# Patient Record
Sex: FEMALE | Race: WHITE
[De-identification: names, ages, dates, MRNs, and addresses within clinical notes are randomized per-mention and may not be internally consistent; named-entity substitution may affect disease eponyms.]

---

## 2018-07-11 ENCOUNTER — HOSPITAL ENCOUNTER (OUTPATIENT)
Dept: HOSPITAL 62 - SC | Age: 63
Discharge: HOME | End: 2018-07-11
Attending: OPHTHALMOLOGY
Payer: MEDICAID

## 2018-07-11 DIAGNOSIS — J45.909: ICD-10-CM

## 2018-07-11 DIAGNOSIS — F17.200: ICD-10-CM

## 2018-07-11 DIAGNOSIS — H25.11: Primary | ICD-10-CM

## 2018-07-11 DIAGNOSIS — I10: ICD-10-CM

## 2018-07-11 PROCEDURE — 08RJ3JZ REPLACEMENT OF RIGHT LENS WITH SYNTHETIC SUBSTITUTE, PERCUTANEOUS APPROACH: ICD-10-PCS | Performed by: OPHTHALMOLOGY

## 2018-07-11 PROCEDURE — 66984 XCAPSL CTRC RMVL W/O ECP: CPT

## 2018-07-11 PROCEDURE — V2630 ANTER CHAMBER INTRAOCUL LENS: HCPCS

## 2018-07-11 RX ADMIN — CYCLOPENTOLATE HYDROCHLORIDE AND PHENYLEPHRINE HYDROCHLORIDE PRN DROP: 2; 10 SOLUTION/ DROPS OPHTHALMIC at 10:27

## 2018-07-11 RX ADMIN — TETRACAINE HYDROCHLORIDE PRN DROP: 25 LIQUID OPHTHALMIC at 10:45

## 2018-07-11 RX ADMIN — TETRACAINE HYDROCHLORIDE PRN DROP: 25 LIQUID OPHTHALMIC at 10:27

## 2018-07-11 RX ADMIN — CYCLOPENTOLATE HYDROCHLORIDE AND PHENYLEPHRINE HYDROCHLORIDE PRN DROP: 2; 10 SOLUTION/ DROPS OPHTHALMIC at 10:38

## 2018-07-11 RX ADMIN — BESIFLOXACIN PRN DROP: 6 SUSPENSION OPHTHALMIC at 10:27

## 2018-07-11 RX ADMIN — BESIFLOXACIN PRN DROP: 6 SUSPENSION OPHTHALMIC at 11:12

## 2018-07-11 RX ADMIN — TOBRAMYCIN AND DEXAMETHASONE ONE APPLIC: 3; 1 OINTMENT OPHTHALMIC at 11:12

## 2018-07-11 RX ADMIN — TROPICAMIDE PRN DROP: 10 SOLUTION/ DROPS OPHTHALMIC at 10:27

## 2018-07-11 RX ADMIN — TETRACAINE HYDROCHLORIDE PRN DROP: 25 LIQUID OPHTHALMIC at 10:07

## 2018-07-11 RX ADMIN — TROPICAMIDE PRN DROP: 10 SOLUTION/ DROPS OPHTHALMIC at 10:05

## 2018-07-11 RX ADMIN — CYCLOPENTOLATE HYDROCHLORIDE AND PHENYLEPHRINE HYDROCHLORIDE PRN DROP: 2; 10 SOLUTION/ DROPS OPHTHALMIC at 10:05

## 2018-07-11 RX ADMIN — TROPICAMIDE PRN DROP: 10 SOLUTION/ DROPS OPHTHALMIC at 10:38

## 2018-07-11 RX ADMIN — TOBRAMYCIN AND DEXAMETHASONE ONE APPLIC: 3; 1 OINTMENT OPHTHALMIC at 00:00

## 2018-07-11 RX ADMIN — TETRACAINE HYDROCHLORIDE PRN DROP: 25 LIQUID OPHTHALMIC at 10:53

## 2018-07-11 RX ADMIN — BESIFLOXACIN PRN DROP: 6 SUSPENSION OPHTHALMIC at 10:06

## 2018-07-11 RX ADMIN — BESIFLOXACIN PRN DROP: 6 SUSPENSION OPHTHALMIC at 00:00

## 2018-07-25 ENCOUNTER — HOSPITAL ENCOUNTER (OUTPATIENT)
Dept: HOSPITAL 62 - SC | Age: 63
Discharge: HOME | End: 2018-07-25
Attending: OPHTHALMOLOGY
Payer: MEDICAID

## 2018-07-25 DIAGNOSIS — F17.210: ICD-10-CM

## 2018-07-25 DIAGNOSIS — K21.9: ICD-10-CM

## 2018-07-25 DIAGNOSIS — E78.00: ICD-10-CM

## 2018-07-25 DIAGNOSIS — Z88.2: ICD-10-CM

## 2018-07-25 DIAGNOSIS — Z79.82: ICD-10-CM

## 2018-07-25 DIAGNOSIS — I10: ICD-10-CM

## 2018-07-25 DIAGNOSIS — H25.12: Primary | ICD-10-CM

## 2018-07-25 DIAGNOSIS — J45.909: ICD-10-CM

## 2018-07-25 DIAGNOSIS — Z79.899: ICD-10-CM

## 2018-07-25 PROCEDURE — V2630 ANTER CHAMBER INTRAOCUL LENS: HCPCS

## 2018-07-25 PROCEDURE — 66984 XCAPSL CTRC RMVL W/O ECP: CPT

## 2018-07-25 RX ADMIN — CYCLOPENTOLATE HYDROCHLORIDE AND PHENYLEPHRINE HYDROCHLORIDE PRN DROP: 2; 10 SOLUTION/ DROPS OPHTHALMIC at 11:25

## 2018-07-25 RX ADMIN — TETRACAINE HYDROCHLORIDE PRN DROP: 25 LIQUID OPHTHALMIC at 11:37

## 2018-07-25 RX ADMIN — CYCLOPENTOLATE HYDROCHLORIDE AND PHENYLEPHRINE HYDROCHLORIDE PRN DROP: 2; 10 SOLUTION/ DROPS OPHTHALMIC at 11:14

## 2018-07-25 RX ADMIN — BESIFLOXACIN PRN DROP: 6 SUSPENSION OPHTHALMIC at 11:04

## 2018-07-25 RX ADMIN — BESIFLOXACIN PRN DROP: 6 SUSPENSION OPHTHALMIC at 00:00

## 2018-07-25 RX ADMIN — SODIUM CHONDROITIN SULFATE / SODIUM HYALURONATE ONE EACH: 0.55-0.5 INJECTION INTRAOCULAR at 00:00

## 2018-07-25 RX ADMIN — SODIUM CHONDROITIN SULFATE / SODIUM HYALURONATE ONE EACH: 0.55-0.5 INJECTION INTRAOCULAR at 11:47

## 2018-07-25 RX ADMIN — BESIFLOXACIN PRN DROP: 6 SUSPENSION OPHTHALMIC at 11:17

## 2018-07-25 RX ADMIN — EPINEPHRINE ONE MG: 1 INJECTION, SOLUTION, CONCENTRATE INTRAVENOUS at 11:47

## 2018-07-25 RX ADMIN — TETRACAINE HYDROCHLORIDE PRN DROP: 25 LIQUID OPHTHALMIC at 00:00

## 2018-07-25 RX ADMIN — HEPARIN SODIUM ONE ML: 1000 INJECTION, SOLUTION INTRAVENOUS; SUBCUTANEOUS at 11:47

## 2018-07-25 RX ADMIN — TROPICAMIDE PRN DROP: 10 SOLUTION/ DROPS OPHTHALMIC at 11:03

## 2018-07-25 RX ADMIN — CYCLOPENTOLATE HYDROCHLORIDE AND PHENYLEPHRINE HYDROCHLORIDE PRN DROP: 2; 10 SOLUTION/ DROPS OPHTHALMIC at 11:03

## 2018-07-25 RX ADMIN — TROPICAMIDE PRN DROP: 10 SOLUTION/ DROPS OPHTHALMIC at 11:14

## 2018-07-25 RX ADMIN — TROPICAMIDE PRN DROP: 10 SOLUTION/ DROPS OPHTHALMIC at 11:25

## 2018-07-25 RX ADMIN — BESIFLOXACIN PRN DROP: 6 SUSPENSION OPHTHALMIC at 11:59

## 2018-07-25 RX ADMIN — TOBRAMYCIN AND DEXAMETHASONE ONE APPLIC: 3; 1 OINTMENT OPHTHALMIC at 11:59

## 2018-07-25 RX ADMIN — EPINEPHRINE ONE MG: 1 INJECTION, SOLUTION, CONCENTRATE INTRAVENOUS at 00:00

## 2018-07-25 RX ADMIN — HEPARIN SODIUM ONE ML: 1000 INJECTION, SOLUTION INTRAVENOUS; SUBCUTANEOUS at 00:00

## 2018-07-25 RX ADMIN — TETRACAINE HYDROCHLORIDE PRN DROP: 25 LIQUID OPHTHALMIC at 11:26

## 2018-07-25 RX ADMIN — TOBRAMYCIN AND DEXAMETHASONE ONE APPLIC: 3; 1 OINTMENT OPHTHALMIC at 00:00

## 2018-07-25 RX ADMIN — TETRACAINE HYDROCHLORIDE PRN DROP: 25 LIQUID OPHTHALMIC at 11:02

## 2019-06-17 ENCOUNTER — HOSPITAL ENCOUNTER (OUTPATIENT)
Dept: HOSPITAL 62 - END | Age: 64
Discharge: HOME | End: 2019-06-17
Attending: INTERNAL MEDICINE
Payer: MEDICAID

## 2019-06-17 VITALS — DIASTOLIC BLOOD PRESSURE: 74 MMHG | SYSTOLIC BLOOD PRESSURE: 126 MMHG

## 2019-06-17 DIAGNOSIS — I11.9: ICD-10-CM

## 2019-06-17 DIAGNOSIS — K62.5: ICD-10-CM

## 2019-06-17 DIAGNOSIS — E07.9: ICD-10-CM

## 2019-06-17 DIAGNOSIS — J44.9: ICD-10-CM

## 2019-06-17 DIAGNOSIS — Z88.2: ICD-10-CM

## 2019-06-17 DIAGNOSIS — K64.8: ICD-10-CM

## 2019-06-17 DIAGNOSIS — F17.210: ICD-10-CM

## 2019-06-17 DIAGNOSIS — D12.6: ICD-10-CM

## 2019-06-17 DIAGNOSIS — D12.5: Primary | ICD-10-CM

## 2019-06-17 PROCEDURE — 93005 ELECTROCARDIOGRAM TRACING: CPT

## 2019-06-17 PROCEDURE — 45385 COLONOSCOPY W/LESION REMOVAL: CPT

## 2019-06-17 PROCEDURE — 94640 AIRWAY INHALATION TREATMENT: CPT

## 2019-06-17 PROCEDURE — 45381 COLONOSCOPY SUBMUCOUS NJX: CPT

## 2019-06-17 PROCEDURE — 93010 ELECTROCARDIOGRAM REPORT: CPT

## 2019-06-17 PROCEDURE — 88305 TISSUE EXAM BY PATHOLOGIST: CPT

## 2019-06-17 NOTE — OPERATIVE REPORT
Operative Report


DATE OF SURGERY: 06/17/19


Operative Report: 





The risks, benefits and alternatives of the procedure including the risk of 

bleeding, perforation requiring surgery have been explained to the patient in 

detail and informed consent has been obtained.  The patient is placed in a left,

lateral decubital position.  Timeout was called.  Propofol medication is 

administered.  Rectal examination is done which did not reveal any masses, tears

or fissures.  An Olympus videoscope was introduced into the patient's rectum.  

The scope was then carefully advanced all the way to the cecum.  The cecum was 

identified by the usual anatomical landmarks including the ileocecal valve as 

well as the appendiceal office.  Photodocumentation is obtained.  The scope was 

then sequentially pulled back via the various segments of the colon including 

the ascending colon, hepatic flexure, transverse colon, splenic flexure, amie

cending colon and brought into the rectosigmoid portions of the colon.  

Retroflexion maneuvers performed.


PREOPERATIVE DIAGNOSIS: Rectal bleeding


POSTOPERATIVE DIAGNOSIS: Colon mass noted at 15 cm.  It is noted just distal to 

the first valve of Rizo.  Lucy ink was used to tattoo the area.  Sigmoid 

colon polyp removed via snare polypectomy and retrieved.  Diverticulosis without

any evidence of diverticulitis.  Internal hemorrhoids.  Hepatic flexure polyp 

removed via snare polypectomy and retrieved


OPERATION: Colonoscopy with snare polypectomy.  Colonoscopy with submucosal 

injection of Lucy ink


SURGEON: BARON GOTTI


ANESTHESIA: LMAC


TISSUE REMOVED OR ALTERED: As noted above.


COMPLICATIONS: 





None.


ESTIMATED BLOOD LOSS: None


INTRAOPERATIVE FINDINGS: As noted above.


PROCEDURE: 





Patient tolerated the procedure well.


No immediate postprocedure complications are noted.


Patient is discharged in good condition.


Discharge date 6/17/2019.


Discharge diet: Regular.


Discharge activity: Regular.


Will need surgical referral for segmental resection pending work-up of 

metastases


Wait on the pathology


Patient is instructed to call the office or proceed to the emergency room should

there be any further problems or questions.


Following surgery she needs a six-month to one year surveillance colonoscopy

## 2019-06-17 NOTE — EKG REPORT
SEVERITY:- OTHERWISE NORMAL ECG -

SINUS RHYTHM

LEFT AXIS DEVIATION

LOW VOLTAGE IN FRONTAL LEADS

:

Confirmed by: Brittany Moon MD 17-Jun-2019 11:27:31

## 2019-08-27 ENCOUNTER — HOSPITAL ENCOUNTER (OUTPATIENT)
Dept: HOSPITAL 62 - OROUT | Age: 64
Discharge: HOME | End: 2019-08-27
Attending: SURGERY
Payer: MEDICARE

## 2019-08-27 VITALS — DIASTOLIC BLOOD PRESSURE: 92 MMHG | SYSTOLIC BLOOD PRESSURE: 152 MMHG

## 2019-08-27 DIAGNOSIS — Z79.82: ICD-10-CM

## 2019-08-27 DIAGNOSIS — Z79.51: ICD-10-CM

## 2019-08-27 DIAGNOSIS — C20: Primary | ICD-10-CM

## 2019-08-27 DIAGNOSIS — F17.210: ICD-10-CM

## 2019-08-27 DIAGNOSIS — J44.9: ICD-10-CM

## 2019-08-27 DIAGNOSIS — D64.9: ICD-10-CM

## 2019-08-27 DIAGNOSIS — K63.89: ICD-10-CM

## 2019-08-27 DIAGNOSIS — Z79.899: ICD-10-CM

## 2019-08-27 DIAGNOSIS — E78.00: ICD-10-CM

## 2019-08-27 LAB
ANION GAP SERPL CALC-SCNC: 5 MMOL/L (ref 5–19)
BUN SERPL-MCNC: 11 MG/DL (ref 7–20)
CALCIUM: 9.6 MG/DL (ref 8.4–10.2)
CEA SERPL-MCNC: 5.9 NG/ML (ref ?–3)
CHLORIDE SERPL-SCNC: 106 MMOL/L (ref 98–107)
CO2 SERPL-SCNC: 29 MMOL/L (ref 22–30)
ERYTHROCYTE [DISTWIDTH] IN BLOOD BY AUTOMATED COUNT: 17.6 % (ref 11.5–14)
GLUCOSE SERPL-MCNC: 97 MG/DL (ref 75–110)
HCT VFR BLD CALC: 27.5 % (ref 36–47)
HGB BLD-MCNC: 8.7 G/DL (ref 12–15.5)
MCH RBC QN AUTO: 22.8 PG (ref 27–33.4)
MCHC RBC AUTO-ENTMCNC: 31.6 G/DL (ref 32–36)
MCV RBC AUTO: 72 FL (ref 80–97)
PLATELET # BLD: 380 10^3/UL (ref 150–450)
POTASSIUM SERPL-SCNC: 4.4 MMOL/L (ref 3.6–5)
RBC # BLD AUTO: 3.82 10^6/UL (ref 3.72–5.28)
WBC # BLD AUTO: 6.3 10^3/UL (ref 4–10.5)

## 2019-08-27 PROCEDURE — 85027 COMPLETE CBC AUTOMATED: CPT

## 2019-08-27 PROCEDURE — 93005 ELECTROCARDIOGRAM TRACING: CPT

## 2019-08-27 PROCEDURE — 71045 X-RAY EXAM CHEST 1 VIEW: CPT

## 2019-08-27 PROCEDURE — 82378 CARCINOEMBRYONIC ANTIGEN: CPT

## 2019-08-27 PROCEDURE — 36415 COLL VENOUS BLD VENIPUNCTURE: CPT

## 2019-08-27 PROCEDURE — 93010 ELECTROCARDIOGRAM REPORT: CPT

## 2019-08-27 PROCEDURE — 80048 BASIC METABOLIC PNL TOTAL CA: CPT

## 2019-08-27 PROCEDURE — 45330 DIAGNOSTIC SIGMOIDOSCOPY: CPT

## 2019-08-27 NOTE — OPERATIVE REPORT
Nonrecallable Operative Report


DATE OF SURGERY: 08/27/19


PREOPERATIVE DIAGNOSIS: rectal mass/rectal cancer


POSTOPERATIVE DIAGNOSIS: Same as above


OPERATION: Flexible sigmoidoscopy


SURGEON: REMA DUONG


ANESTHESIA: LMAC


TISSUE REMOVED OR ALTERED: None


COMPLICATIONS: 





None apparent


ESTIMATED BLOOD LOSS: None


PROCEDURE: 





Procedure in detail: After informed consent was obtained, the patient was 

brought into the operating room and laid in the left lateral decubitus position.

 After adequate anesthesia had been attained, the flexible sigmoidoscope was 

inserted into the rectum.  At approximately 8.5 cm from the dentate line, in the

rectum a mass like lesion was identified with a depressed center.  This is 

highly suspicious for rectal cancer.  A previous tattoo was identified distal to

the lesion.  The lesion was located at approximately 8:00 on the lateral left 

side of the rectal wall.  Once this was confirmed, the procedure was concluded. 

All sponge, instrument, needle counts were correct.





Condition: Stable.

## 2019-08-27 NOTE — RADIOLOGY REPORT (SQ)
EXAM DESCRIPTION:  CHEST SINGLE VIEW



COMPLETED DATE/TIME:  8/27/2019 9:51 am



REASON FOR STUDY:  PREOP



COMPARISON:  7/12/2019



EXAM PARAMETERS:  NUMBER OF VIEWS: One view.

TECHNIQUE: Single frontal radiographic view of the chest acquired.

RADIATION DOSE: NA

LIMITATIONS: None.



FINDINGS:  LUNGS AND PLEURA: No opacities, masses or pneumothorax. No pleural effusion.

MEDIASTINUM AND HILAR STRUCTURES: No masses.  Contour normal.

HEART AND VASCULAR STRUCTURES: Heart normal in size.  Normal vasculature.

BONES: No acute findings.

HARDWARE: None in the chest.

OTHER: No other significant finding.



IMPRESSION:  No evidence of acute cardiopulmonary process.



TECHNICAL DOCUMENTATION:  JOB ID:  [id number]

 2011 [employer]- All Rights Reserved



Reading location - IP/workstation name: [email or web address]

## 2019-08-27 NOTE — EKG REPORT
SEVERITY:- ABNORMAL ECG -

SINUS RHYTHM

SUPRAVENTRICULAR BIGEMINY

LEFT AXIS DEVIATION

LOW VOLTAGE IN FRONTAL LEADS

:

Confirmed by: Oleksandr Abel MD 27-Aug-2019 18:58:25

## 2019-08-27 NOTE — DISCHARGE SUMMARY
Discharge Summary (SDC)





- Discharge


Final Diagnosis: 





rectal cancer


Date of Surgery: 08/27/19


Discharge Date: 08/27/19


Condition: Stable


Treatment or Instructions: 


Discharge home.  Diet as tolerated.  Activity: As tolerated.  Follow-up with me 

in 1 to 2 weeks.  My office will schedule a rectal MRI for the patient.  They 

will call her with the date and time.


Referrals: 


ROBBI NOBLE FNP-C [Primary Care Provider] - 


Discharge Diet: As Tolerated


Respiratory Treatments at Home: Deep Breathing/Coughing, Incentive Spirometer


Discharge Activity: Activity As Tolerated


Home Care Assistance: None Needed


Report the Following to Your Physician Immediately: Shortness of Breath, Nausea,

Vomiting, Increase in Pain

## 2019-09-03 ENCOUNTER — HOSPITAL ENCOUNTER (OUTPATIENT)
Dept: HOSPITAL 62 - RAD | Age: 64
End: 2019-09-03
Attending: SURGERY
Payer: MEDICAID

## 2019-09-03 DIAGNOSIS — K57.30: ICD-10-CM

## 2019-09-03 DIAGNOSIS — D49.0: Primary | ICD-10-CM

## 2019-09-03 PROCEDURE — A9576 INJ PROHANCE MULTIPACK: HCPCS

## 2019-09-03 PROCEDURE — 72197 MRI PELVIS W/O & W/DYE: CPT

## 2019-09-03 NOTE — RADIOLOGY REPORT (SQ)
EXAM DESCRIPTION:  MRI PELVIS COMBO



COMPLETED DATE/TIME:  9/3/2019



REASON FOR STUDY:  K63.9 DISEASE OF INTESTINE, UNSPECIFIED K63.9  DISEASE OF INTESTINE, UNSPECIFIED



COMPARISON:  CT abdomen pelvis 7/12/2019



TECHNIQUE:  Sagittal, axial oblique, and coronal oblique T2-weighted images of the pelvis without con
trast centered on the rectum.  Axial images of the pelvis.



FINDINGS:  BRIEF DESCRIPTION OF MASS: A nearly circumferential apple core lesion of the distal sigmoi
d colon is present, 3.5 cm in greatest length

LOCATION OF TUMOR: Distal sigmoid colon

DISTANCE FROM ANORECTAL JUNCTION TO LOWER POLE OF TUMOR: 5  cm.

CIRCUMFERENTIAL LOCATION OF TUMOR: Distal sigmoid colon

LENGTH OF TUMOR: 3.5 cm.

INVOLVEMENT OF MUSCULARIS PROPIA: Yes

EXTENSION BEYOND MUSCULARIS PROPIA: Yes, contrast-enhancement and stranding of pericolic fat worrisom
e for tumor extension beyond the muscularis propria on axial image 53

DISTANCE BETWEEN TUMOR AND MESORECTAL FASCIA: At the level of the fascia

PATHOLOGIC LYMPH NODES: 9 mm lymph node in the presacral fat axial image 5/32

6 mm left internal iliac lymph node axial image 6/32

7 mm lymph node in the perirectal fat axial image 8/32

5 mm lymph node perirectal fat axial image 10/32

6 mm perirectal fat lymph node axial image 11/32

EXTRAMURAL/VASCULAR INVASION: No  DESCRIPTION: Not applicable.

INVASION OF PELVIC STRUCTURES: No  DESCRIPTION: Not applicable.

Remainder of the study demonstrates normal size female pelvic organs with mild artifact post tubal li
gation clips.  Sigmoid diverticuli are present.  No free pelvic fluid.  No bony abnormalities.  Next



IMPRESSION:  T STAGE: T3 Tumor invades subserosea through muscularis propria

N STAGE: N1 1-3 pathologic lymph nodes



TECHNICAL DOCUMENTATION:  JOB ID:  [id number]

 2011 [employer]- All Rights Reserved



Reading location - IP/workstation name: [email or web address]

## 2019-09-16 ENCOUNTER — HOSPITAL ENCOUNTER (OUTPATIENT)
Dept: HOSPITAL 62 - OD | Age: 64
End: 2019-09-16
Attending: SURGERY
Payer: MEDICAID

## 2019-09-16 DIAGNOSIS — M19.90: ICD-10-CM

## 2019-09-16 DIAGNOSIS — K63.89: ICD-10-CM

## 2019-09-16 DIAGNOSIS — Z79.82: ICD-10-CM

## 2019-09-16 DIAGNOSIS — I11.9: ICD-10-CM

## 2019-09-16 DIAGNOSIS — E03.9: ICD-10-CM

## 2019-09-16 DIAGNOSIS — E78.00: ICD-10-CM

## 2019-09-16 DIAGNOSIS — F41.8: ICD-10-CM

## 2019-09-16 DIAGNOSIS — C20: ICD-10-CM

## 2019-09-16 DIAGNOSIS — Z01.818: Primary | ICD-10-CM

## 2019-09-16 LAB
ANION GAP SERPL CALC-SCNC: 9 MMOL/L (ref 5–19)
BUN SERPL-MCNC: 17 MG/DL (ref 7–20)
CALCIUM: 10 MG/DL (ref 8.4–10.2)
CHLORIDE SERPL-SCNC: 106 MMOL/L (ref 98–107)
CO2 SERPL-SCNC: 27 MMOL/L (ref 22–30)
ERYTHROCYTE [DISTWIDTH] IN BLOOD BY AUTOMATED COUNT: 18.4 % (ref 11.5–14)
GLUCOSE SERPL-MCNC: 87 MG/DL (ref 75–110)
HCT VFR BLD CALC: 27.9 % (ref 36–47)
HGB BLD-MCNC: 8.7 G/DL (ref 12–15.5)
MCH RBC QN AUTO: 22.1 PG (ref 27–33.4)
MCHC RBC AUTO-ENTMCNC: 31.2 G/DL (ref 32–36)
MCV RBC AUTO: 71 FL (ref 80–97)
PLATELET # BLD: 429 10^3/UL (ref 150–450)
POTASSIUM SERPL-SCNC: 4.6 MMOL/L (ref 3.6–5)
RBC # BLD AUTO: 3.94 10^6/UL (ref 3.72–5.28)
WBC # BLD AUTO: 7.8 10^3/UL (ref 4–10.5)

## 2019-09-16 PROCEDURE — 80048 BASIC METABOLIC PNL TOTAL CA: CPT

## 2019-09-16 PROCEDURE — 85027 COMPLETE CBC AUTOMATED: CPT

## 2019-09-16 PROCEDURE — 93005 ELECTROCARDIOGRAM TRACING: CPT

## 2019-09-16 PROCEDURE — 93010 ELECTROCARDIOGRAM REPORT: CPT

## 2019-09-16 PROCEDURE — 36415 COLL VENOUS BLD VENIPUNCTURE: CPT

## 2019-09-16 PROCEDURE — 71046 X-RAY EXAM CHEST 2 VIEWS: CPT

## 2019-09-16 NOTE — EKG REPORT
SEVERITY:- BORDERLINE ECG -

SINUS RHYTHM

ATRIAL PREMATURE COMPLEX

BORDERLINE LEFT AXIS DEVIATION

LOW VOLTAGE IN FRONTAL LEADS

:

Confirmed by: Oleksandr Abel MD 16-Sep-2019 13:31:25

## 2019-09-16 NOTE — RADIOLOGY REPORT (SQ)
EXAM DESCRIPTION:  CHEST PA/LATERAL



COMPLETED DATE/TIME:  9/16/2019 11:04 am



REASON FOR STUDY:  PRE-OP



COMPARISON:  8/27/2019.



EXAM PARAMETERS:  NUMBER OF VIEWS: two views

TECHNIQUE: Digital Frontal and Lateral radiographic views of the chest acquired.

RADIATION DOSE: NA

LIMITATIONS: none



FINDINGS:  LUNGS AND PLEURA: No opacities, masses or pneumothorax. No pleural effusion.

MEDIASTINUM AND HILAR STRUCTURES: No masses or contour abnormalities.

HEART AND VASCULAR STRUCTURES: Heart normal size.  No evidence for failure.

BONES: No acute findings.

HARDWARE: None in the chest.

OTHER: Hiatal hernia.  No other significant finding.



IMPRESSION:  NO ACUTE RADIOGRAPHIC FINDING IN THE CHEST.



TECHNICAL DOCUMENTATION:  JOB ID:  [id number]

 2011 [employer]- All Rights Reserved



Reading location - IP/workstation name: [email or web address]

## 2019-09-20 LAB
ANION GAP SERPL CALC-SCNC: 13 MMOL/L (ref 5–19)
BUN SERPL-MCNC: 25 MG/DL (ref 7–20)
CALCIUM: 9.8 MG/DL (ref 8.4–10.2)
CHLORIDE SERPL-SCNC: 103 MMOL/L (ref 98–107)
CO2 SERPL-SCNC: 25 MMOL/L (ref 22–30)
ERYTHROCYTE [DISTWIDTH] IN BLOOD BY AUTOMATED COUNT: 18.4 % (ref 11.5–14)
GLUCOSE SERPL-MCNC: 100 MG/DL (ref 75–110)
HCT VFR BLD CALC: 28.7 % (ref 36–47)
HGB BLD-MCNC: 8.7 G/DL (ref 12–15.5)
MCH RBC QN AUTO: 21.3 PG (ref 27–33.4)
MCHC RBC AUTO-ENTMCNC: 30.3 G/DL (ref 32–36)
MCV RBC AUTO: 70 FL (ref 80–97)
PLATELET # BLD: 404 10^3/UL (ref 150–450)
POTASSIUM SERPL-SCNC: 4.5 MMOL/L (ref 3.6–5)
RBC # BLD AUTO: 4.09 10^6/UL (ref 3.72–5.28)
WBC # BLD AUTO: 8.9 10^3/UL (ref 4–10.5)

## 2019-09-20 NOTE — EKG REPORT
SEVERITY:- ABNORMAL ECG -

SINUS RHYTHM

PROBABLE INFERIOR INFARCT, OLD

:

Confirmed by: Oleksandr Abel MD 20-Sep-2019 15:06:45

## 2019-09-23 ENCOUNTER — HOSPITAL ENCOUNTER (OUTPATIENT)
Dept: HOSPITAL 62 - OROUT | Age: 64
Discharge: HOME | End: 2019-09-23
Attending: SURGERY
Payer: MEDICAID

## 2019-09-23 VITALS — DIASTOLIC BLOOD PRESSURE: 73 MMHG | SYSTOLIC BLOOD PRESSURE: 119 MMHG

## 2019-09-23 DIAGNOSIS — D64.9: ICD-10-CM

## 2019-09-23 DIAGNOSIS — K63.89: ICD-10-CM

## 2019-09-23 DIAGNOSIS — F17.210: ICD-10-CM

## 2019-09-23 DIAGNOSIS — Z79.899: ICD-10-CM

## 2019-09-23 DIAGNOSIS — J44.9: ICD-10-CM

## 2019-09-23 DIAGNOSIS — I11.9: ICD-10-CM

## 2019-09-23 DIAGNOSIS — C20: Primary | ICD-10-CM

## 2019-09-23 DIAGNOSIS — Z79.51: ICD-10-CM

## 2019-09-23 DIAGNOSIS — Z79.82: ICD-10-CM

## 2019-09-23 DIAGNOSIS — E78.00: ICD-10-CM

## 2019-09-23 DIAGNOSIS — E03.9: ICD-10-CM

## 2019-09-23 PROCEDURE — C1788 PORT, INDWELLING, IMP: HCPCS

## 2019-09-23 PROCEDURE — 93010 ELECTROCARDIOGRAM REPORT: CPT

## 2019-09-23 PROCEDURE — 88305 TISSUE EXAM BY PATHOLOGIST: CPT

## 2019-09-23 PROCEDURE — 71045 X-RAY EXAM CHEST 1 VIEW: CPT

## 2019-09-23 PROCEDURE — 80048 BASIC METABOLIC PNL TOTAL CA: CPT

## 2019-09-23 PROCEDURE — 00532 ANES ACCESS CTR VENOUS CRCJ: CPT

## 2019-09-23 PROCEDURE — 85027 COMPLETE CBC AUTOMATED: CPT

## 2019-09-23 PROCEDURE — 36415 COLL VENOUS BLD VENIPUNCTURE: CPT

## 2019-09-23 PROCEDURE — 93005 ELECTROCARDIOGRAM TRACING: CPT

## 2019-09-23 PROCEDURE — 36561 INSERT TUNNELED CV CATH: CPT

## 2019-09-23 PROCEDURE — 77001 FLUOROGUIDE FOR VEIN DEVICE: CPT

## 2019-09-23 PROCEDURE — 45331 SIGMOIDOSCOPY AND BIOPSY: CPT

## 2019-09-23 NOTE — RADIOLOGY REPORT (SQ)
EXAM DESCRIPTION:  CHEST SINGLE VIEW; FLUORO/CV PLACEMENT



COMPLETED DATE/TIME:  9/23/2019 5:10 pm



REASON FOR STUDY:  PORT A CATH RT SIDE C20  MALIGNANT NEOPLASM OF RECTUM K63.89  OTHER SPECIFIED DISE
ASES OF INTESTINE



COMPARISON:  Chest radiograph



FLUOROSCOPY TIME:  0.8 minutes

2 images saved to PACS.



TECHNIQUE:  Intra-operative images acquired during surgical procedure to evaluate progress.

NUMBER OF IMAGES: 2



LIMITATIONS:  None.



FINDINGS:  Placement of venous access catheter.  Tip at the cavoatrial junction.



IMPRESSION:  IMAGE(S) OBTAINED DURING PROCEDURE.



COMMENT:  Quality :  Final reports for procedures using fluoroscopy that document radiation exp
osure indices, or exposure time and number of fluorographic images (if radiation exposure indices are
 not available)

Please consult full operative report of the attending physician for description of the procedure.



TECHNICAL DOCUMENTATION:  JOB ID:  [id number]

 2011 [employer] All Rights Reserved



Reading location - IP/workstation name: [email or web address]

## 2019-09-23 NOTE — RADIOLOGY REPORT (SQ)
EXAM DESCRIPTION:  CHEST SINGLE VIEW; FLUORO/CV PLACEMENT



COMPLETED DATE/TIME:  9/23/2019 5:10 pm



REASON FOR STUDY:  PORT A CATH RT SIDE C20  MALIGNANT NEOPLASM OF RECTUM K63.89  OTHER SPECIFIED DISE
ASES OF INTESTINE



COMPARISON:  Chest radiograph



FLUOROSCOPY TIME:  0.8 minutes

2 images saved to PACS.



TECHNIQUE:  Intra-operative images acquired during surgical procedure to evaluate progress.

NUMBER OF IMAGES: 2



LIMITATIONS:  None.



FINDINGS:  Placement of venous access catheter.  Tip at the cavoatrial junction.



IMPRESSION:  IMAGE(S) OBTAINED DURING PROCEDURE.



COMMENT:  Quality :  Final reports for procedures using fluoroscopy that document radiation exp
osure indices, or exposure time and number of fluorographic images (if radiation exposure indices are
 not available)

Please consult full operative report of the attending physician for description of the procedure.



TECHNICAL DOCUMENTATION:  JOB ID:  [id number]

 2011 [employer]- All Rights Reserved



Reading location - IP/workstation name: [email or web address]

## 2019-09-23 NOTE — RADIOLOGY REPORT (SQ)
EXAM DESCRIPTION:  CHEST SINGLE VIEW



COMPLETED DATE/TIME:  9/23/2019 5:32 pm



REASON FOR STUDY:  post op pacu



COMPARISON:  9/16/2019



EXAM PARAMETERS:  NUMBER OF VIEWS: One view.

TECHNIQUE: Single frontal radiographic view of the chest acquired.

RADIATION DOSE: NA

LIMITATIONS: None.



FINDINGS:  LUNGS AND PLEURA: No opacities, masses or pneumothorax. No pleural effusion.

MEDIASTINUM AND HILAR STRUCTURES: Large retrocardiac hiatal hernia.

HEART AND VASCULAR STRUCTURES: Heart normal in size.  Normal vasculature.

BONES: No acute findings.

HARDWARE: New venous access catheter tip at the cavoatrial junction.

OTHER: No other significant finding.



IMPRESSION:  New venous access catheter tip cavoatrial junction.  No pneumothorax.



TECHNICAL DOCUMENTATION:  JOB ID:  [id number]

 2011 [employer]- All Rights Reserved



Reading location - IP/workstation name: [email or web address]

## 2019-09-24 ENCOUNTER — HOSPITAL ENCOUNTER (OUTPATIENT)
Dept: HOSPITAL 62 - RAD | Age: 64
End: 2019-09-24
Attending: INTERNAL MEDICINE
Payer: MEDICAID

## 2019-09-24 DIAGNOSIS — C20: Primary | ICD-10-CM

## 2019-09-24 PROCEDURE — 78815 PET IMAGE W/CT SKULL-THIGH: CPT

## 2019-09-24 PROCEDURE — A9552 F18 FDG: HCPCS

## 2019-09-24 NOTE — DISCHARGE SUMMARY
Discharge Summary (SDC)





- Discharge


Final Diagnosis: 





Rectal cancer


Date of Surgery: 09/23/19


Discharge Date: 09/23/19


Condition: Stable


Forms:  [facility] Anesthesia D/C Instruction, Discharge POC-Surgical Service


Treatment or Instructions: 


Discharge home.  Diet as tolerated.  Activity: Nonstrenuous.  Follow-up with me 

will be arranged by my office.


Referrals: 


RASTA ARMANDO FNP-C [Primary Care Provider] - 


REMA ECHOLS MD [ACTIVE STAFF] -  (Dr Echols's office will call to schedule 

follow up)


Discharge Diet: As Tolerated


Respiratory Treatments at Home: Deep Breathing/Coughing, Incentive Spirometer


Discharge Activity: Balance Activity w/Rest


Home Care Assistance: None Needed


Report the Following to Your Physician Immediately: Shortness of Breath, Nausea,

Increase in Pain, Fever over 101 Degrees, Unusual Bleeding, Redness, Swelling, 

Warmth, Increased Soreness, Drainage-Yellow, IV Site Infection Signs

## 2019-09-24 NOTE — OPERATIVE REPORT
Nonrecallable Operative Report


DATE OF SURGERY: 09/23/19


PREOPERATIVE DIAGNOSIS: Rectal cancer


POSTOPERATIVE DIAGNOSIS: Same as above


OPERATION: 1.  Ultrasound-guided central venous puncture.  2.  Right internal 

jugular vein Mediport placement.  3.  Flexible sigmoidoscopy with biopsy of 

rectal cancer


SURGEON: REMA DUONG


ANESTHESIA: LMAC


TISSUE REMOVED OR ALTERED: Rectal biopsy


COMPLICATIONS: 





None apparent


ESTIMATED BLOOD LOSS: Minimal


PROCEDURE: 





Indication for the procedure: This is a 64-year-old female with a known rectal 

mass.  It has previously been imaged and tattooed.  Patient has advanced rectal 

cancer, however no biopsy has to-date been obtained.  Oncology is requesting 

biopsy so they may examine the histology of the tumor, prior to neoadjuvant 

chemoradiation.





Drains/implants: Mediport in the right chest wall.





Procedure in detail: After informed consent was obtained, the patient was 

brought to the operating room and laid in the Trendelenburg position.  The area 

of the neck and chest were prepped and draped in a normal sterile fashion.  An 

ultrasound was used to identify the right internal jugular vein.  It was 

compressible with normal flow.  Under direct ultrasonic guidance, the right 

internal jugular vein was cannulated with the supplied access needle.  Dark 

venous, nonpulsatile blood was returned in the syringe.  The wire was inserted 

easily into the lumen of the vein.  The wire was confirmed to be within the vein

using both fluoroscopy as well as ultrasonography.  Next, the catheter was 

tunneled from a separate stab incision in the right chest up to the needle 

insertion site.  Next the dilator and breakaway sheath were inserted over the 

wire.  The dilator and wire were removed as one continuous piece.  The catheter 

was inserted into the breakaway sheath.  The sheath was cracked and pulled away,

leaving the catheter within the superior vena cava.  The catheter was pulled 

back to an appropriate level, trimmed to length, and then the Mediport hub was 

attached.  The hub was buried within the pocket.  It was sutured to the chest 

wall using 3-0 Vicryl suture.  The hub was accessed and flushed easily with 

heparinized saline.  The subcutaneous tissues were closed using 3-0 Vicryl 

suture.  The overlying skin was closed using 4-0 Vicryl Rapide suture.  A 

dressing was placed, and this portion of the procedure was concluded.





Next, the patient was placed into frog-leg position.  The flexible sigmoidoscope

was inserted into the rectum.  Inside the rectum, at approximately 8 cm, the 

large rectal tumor was identified.  Multiple biopsies were taken 

circumferentially around the mass.  After adequate tissue had been obtained, the

scope was removed, and the procedure was concluded.  All sponge, instrument, and

needle counts were correct x2.





Condition: Stable.

## 2019-09-25 NOTE — RADIOLOGY REPORT (SQ)
EXAM DESCRIPTION:  PET CT SKULL/THIGH



COMPLETED DATE/TIME:  9/24/2019 9:39 pm



REASON FOR STUDY:  (C21.8)MALIG NEOPLASM OF OVRLP SITES OF RECTUM, ANUS AND ANAL CANAL C21.8  MALIG N
EOPLASM OF OVRLP SITES OF RECTUM, ANUS AND ANAL



COMPARISON:  No prior pets, MR 9/3/2019



RADIONUCLIDE AND DOSE:  11.41 mCi F18 FDG

The route of agent administration: Intravenous



FASTING BLOOD SUGAR:  96 mg/dl



CONTRAST TYPE AND DOSE:  No CT contrast given.



TECHNIQUE:  Blood glucose level was verified.  Above dose of FDG was injected intravenously.  2-D seg
mented attenuation correction images were obtained from the base of the skull to the midthighs.  Nonc
ontrast CT images were obtained for attenuation correction and fusion with emission images.  CT image
s were performed without oral or intravenous contrast and are not sensitive for parenchymal lesions. 
 A series of overlapping emission PET images were obtained.  [facility] and [facility] by the radiologist.  Images stored on PACS.



LIMITATIONS:  None.



FINDINGS:  HEAD AND NECK:  Partially evaluated hypoattenuation along the anterior left frontal lobe w
ithout metabolic activity possibly related to prior infarct or arachnoid cyst.  Asymmetric activity w
ithin the right lateral pterygoid muscle without definite CT correlate (max SUV 14.7).  No other area
s of focal increased FDG uptake within the head or neck.

CHEST: No areas of abnormal metabolic activity in the chest.

ABDOMEN AND PELVIS: Background liver activity max SUV 3.3.  Known distal sigmoid mass demonstrates av
id increased FDG activity (max SUV 29).  Presacral node measuring 7 mm in short axis (series 3, image
 181) with mild uptake (max SUV 2.2).  No other definitive areas of focally abnormal FDG uptake withi
n the abdomen or pelvis.  Diffuse uptake within the gluteal musculature likely physiologic.  Addition
al expected physiologic activity within the gastrointestinal and genitourinary system.

PROXIMAL LOWER EXTREMITIES: No areas of abnormal metabolic activity in the soft tissues of the lower 
extremities.

BONES: No abnormal metabolic activity in the visualized skeleton.

ADDITIONAL CT FINDINGS: Right internal jugular based chest port with catheter tip at cavoatrial junct
ion.  No evidence of acute intrathoracic process.  Unchanged large hiatal hernia.  Cholelithiasis.  U
nchanged large right renal stones and chronic hydronephrosis.  Punctate left renal stones.  Colonic d
iverticulosis.  Known distal sigmoid mass better evaluated on prior MRI.  No evidence of acute bony a
bnormality.



IMPRESSION:  1.  Avid uptake within the known distal sigmoid mass compatible with neoplasm (max SUV 2
9).

2.  Mild uptake within a 7 mm short axis presacral lymph node (max SUV 2.2), nonspecific and moderate
ly suspicious for metastatic disease.  Additional previous described perirectal lymph nodes without s
ignificant uptake

3.  Asymmetric uptake within the right lateral pterygoid muscle without definitive CT correlate.  Pos
sibly physiologic.  Recommend correlation with symptoms.  Contrasted MR or CT could be considered if 
clinical concern for underlying lesion.

4.  Partially evaluated hypoattenuation along the anterior left frontal lobe without metabolic activi
ty possibly related to prior infarct or arachnoid cyst.  Correlation with available priors or head CT
/MR could be considered for further characterization.

5. No other abnormal foci of increased uptake to suggest additional distant disease.



TECHNICAL DOCUMENTATION:  JOB ID:  [id number]

 2011 [employer]- All Rights Reserved



Reading location - IP/workstation name: [email or web address]

## 2020-01-03 LAB
ANION GAP SERPL CALC-SCNC: 13 MMOL/L (ref 5–19)
BUN SERPL-MCNC: 20 MG/DL (ref 7–20)
CALCIUM: 9.6 MG/DL (ref 8.4–10.2)
CHLORIDE SERPL-SCNC: 107 MMOL/L (ref 98–107)
CO2 SERPL-SCNC: 24 MMOL/L (ref 22–30)
ERYTHROCYTE [DISTWIDTH] IN BLOOD BY AUTOMATED COUNT: 28.8 % (ref 11.5–14)
GLUCOSE SERPL-MCNC: 68 MG/DL (ref 75–110)
HCT VFR BLD CALC: 36 % (ref 36–47)
HGB BLD-MCNC: 12.3 G/DL (ref 12–15.5)
MCH RBC QN AUTO: 31 PG (ref 27–33.4)
MCHC RBC AUTO-ENTMCNC: 34.1 G/DL (ref 32–36)
MCV RBC AUTO: 91 FL (ref 80–97)
PLATELET # BLD: 329 10^3/UL (ref 150–450)
POTASSIUM SERPL-SCNC: 4.5 MMOL/L (ref 3.6–5)
RBC # BLD AUTO: 3.96 10^6/UL (ref 3.72–5.28)
WBC # BLD AUTO: 7.2 10^3/UL (ref 4–10.5)

## 2020-01-03 NOTE — RADIOLOGY REPORT (SQ)
EXAM DESCRIPTION:  CHEST PA/LATERAL



COMPLETED DATE/TIME:  1/3/2020 10:47 am



REASON FOR STUDY:  PRE-OP



COMPARISON:  9/23/2019



EXAM PARAMETERS:  NUMBER OF VIEWS: two views

TECHNIQUE: Digital Frontal and Lateral radiographic views of the chest acquired.

RADIATION DOSE: NA

LIMITATIONS: none



FINDINGS:  LUNGS AND PLEURA: Patchy left basilar opacity, new from prior.  No significant effusion.  
No pneumothorax.

MEDIASTINUM AND HILAR STRUCTURES: No masses or contour abnormalities.

HEART AND VASCULAR STRUCTURES: Heart normal size.  No evidence for failure.

BONES: No acute findings.

HARDWARE: Right internal jugular chest port catheter tip overlies SVC.

OTHER: Hiatal hernia.



IMPRESSION:  1.  New patchy left basilar opacities, possibly atelectasis or infection.

2.  Hiatal hernia.



TECHNICAL DOCUMENTATION:  JOB ID:  [id number]

 2011 [employer]- All Rights Reserved



Reading location - IP/workstation name: [email or web address]

## 2020-01-03 NOTE — EKG REPORT
SEVERITY:- ABNORMAL ECG -

SINUS RHYTHM

INFERIOR INFARCT, OLD

:

Confirmed by: Oleksandr Abel MD 03-Jan-2020 10:28:15

## 2020-01-10 ENCOUNTER — HOSPITAL ENCOUNTER (INPATIENT)
Dept: HOSPITAL 62 - INOR | Age: 65
LOS: 10 days | Discharge: HOME HEALTH | DRG: 331 | End: 2020-01-20
Attending: SURGERY | Admitting: SURGERY
Payer: MEDICAID

## 2020-01-10 DIAGNOSIS — E03.9: ICD-10-CM

## 2020-01-10 DIAGNOSIS — Z91.041: ICD-10-CM

## 2020-01-10 DIAGNOSIS — C20: Primary | ICD-10-CM

## 2020-01-10 DIAGNOSIS — Z79.82: ICD-10-CM

## 2020-01-10 DIAGNOSIS — J44.9: ICD-10-CM

## 2020-01-10 DIAGNOSIS — Z88.2: ICD-10-CM

## 2020-01-10 DIAGNOSIS — Z79.899: ICD-10-CM

## 2020-01-10 DIAGNOSIS — I51.9: ICD-10-CM

## 2020-01-10 DIAGNOSIS — F41.8: ICD-10-CM

## 2020-01-10 DIAGNOSIS — E66.9: ICD-10-CM

## 2020-01-10 DIAGNOSIS — I10: ICD-10-CM

## 2020-01-10 DIAGNOSIS — E78.00: ICD-10-CM

## 2020-01-10 DIAGNOSIS — F17.210: ICD-10-CM

## 2020-01-10 DIAGNOSIS — M19.90: ICD-10-CM

## 2020-01-10 LAB
ARTERIAL BLOOD FIO2: (no result)
ARTERIAL BLOOD H2CO3: 1.48 MMOL/L (ref 1.05–1.35)
ARTERIAL BLOOD HCO3: 24.3 MMOL/L (ref 20–24)
ARTERIAL BLOOD PCO2: 49.1 MMHG (ref 35–45)
ARTERIAL BLOOD PH: 7.31 (ref 7.35–7.45)
ARTERIAL BLOOD PO2: 57.5 MMHG (ref 80–100)
ARTERIAL BLOOD TOTAL CO2: 25.8 MMOL/L (ref 21–25)
BASE EXCESS BLDA CALC-SCNC: -2.2 MMOL/L
SAO2 % BLDA: 87.2 % (ref 94–98)

## 2020-01-10 PROCEDURE — 88305 TISSUE EXAM BY PATHOLOGIST: CPT

## 2020-01-10 PROCEDURE — 94799 UNLISTED PULMONARY SVC/PX: CPT

## 2020-01-10 PROCEDURE — 80053 COMPREHEN METABOLIC PANEL: CPT

## 2020-01-10 PROCEDURE — 86900 BLOOD TYPING SEROLOGIC ABO: CPT

## 2020-01-10 PROCEDURE — C1758 CATHETER, URETERAL: HCPCS

## 2020-01-10 PROCEDURE — 36415 COLL VENOUS BLD VENIPUNCTURE: CPT

## 2020-01-10 PROCEDURE — 71045 X-RAY EXAM CHEST 1 VIEW: CPT

## 2020-01-10 PROCEDURE — 71046 X-RAY EXAM CHEST 2 VIEWS: CPT

## 2020-01-10 PROCEDURE — 88309 TISSUE EXAM BY PATHOLOGIST: CPT

## 2020-01-10 PROCEDURE — 0D1B0Z4 BYPASS ILEUM TO CUTANEOUS, OPEN APPROACH: ICD-10-PCS | Performed by: SURGERY

## 2020-01-10 PROCEDURE — 88342 IMHCHEM/IMCYTCHM 1ST ANTB: CPT

## 2020-01-10 PROCEDURE — 80048 BASIC METABOLIC PNL TOTAL CA: CPT

## 2020-01-10 PROCEDURE — 8E0W4CZ ROBOTIC ASSISTED PROCEDURE OF TRUNK REGION, PERCUTANEOUS ENDOSCOPIC APPROACH: ICD-10-PCS | Performed by: SURGERY

## 2020-01-10 PROCEDURE — 93005 ELECTROCARDIOGRAM TRACING: CPT

## 2020-01-10 PROCEDURE — 00840 ANES IPER PX LOWER ABD NOS: CPT

## 2020-01-10 PROCEDURE — 83036 HEMOGLOBIN GLYCOSYLATED A1C: CPT

## 2020-01-10 PROCEDURE — 85025 COMPLETE CBC W/AUTO DIFF WBC: CPT

## 2020-01-10 PROCEDURE — 82803 BLOOD GASES ANY COMBINATION: CPT

## 2020-01-10 PROCEDURE — 74018 RADEX ABDOMEN 1 VIEW: CPT

## 2020-01-10 PROCEDURE — 85027 COMPLETE CBC AUTOMATED: CPT

## 2020-01-10 PROCEDURE — 86901 BLOOD TYPING SEROLOGIC RH(D): CPT

## 2020-01-10 PROCEDURE — 82962 GLUCOSE BLOOD TEST: CPT

## 2020-01-10 PROCEDURE — 93010 ELECTROCARDIOGRAM REPORT: CPT

## 2020-01-10 PROCEDURE — 86850 RBC ANTIBODY SCREEN: CPT

## 2020-01-10 PROCEDURE — S0028 INJECTION, FAMOTIDINE, 20 MG: HCPCS

## 2020-01-10 PROCEDURE — 0DBP0ZZ EXCISION OF RECTUM, OPEN APPROACH: ICD-10-PCS | Performed by: SURGERY

## 2020-01-10 PROCEDURE — 88304 TISSUE EXAM BY PATHOLOGIST: CPT

## 2020-01-10 RX ADMIN — SODIUM CHLORIDE, SODIUM LACTATE, POTASSIUM CHLORIDE, AND CALCIUM CHLORIDE PRN MLS: .6; .31; .03; .02 INJECTION, SOLUTION INTRAVENOUS at 10:00

## 2020-01-10 RX ADMIN — MORPHINE SULFATE PRN MG: 10 INJECTION INTRAMUSCULAR; INTRAVENOUS; SUBCUTANEOUS at 22:41

## 2020-01-10 RX ADMIN — FENTANYL CITRATE PRN MCG: 50 INJECTION INTRAMUSCULAR; INTRAVENOUS at 17:10

## 2020-01-10 RX ADMIN — FENTANYL CITRATE PRN MCG: 50 INJECTION INTRAMUSCULAR; INTRAVENOUS at 16:48

## 2020-01-10 RX ADMIN — SODIUM CHLORIDE, SODIUM LACTATE, POTASSIUM CHLORIDE, AND CALCIUM CHLORIDE PRN MLS/HR: .6; .31; .03; .02 INJECTION, SOLUTION INTRAVENOUS at 19:08

## 2020-01-10 RX ADMIN — Medication SCH MLS/HR: at 21:15

## 2020-01-10 RX ADMIN — DEXAMETHASONE SODIUM PHOSPHATE PRN MG: 10 INJECTION INTRAMUSCULAR; INTRAVENOUS at 21:48

## 2020-01-10 RX ADMIN — FAMOTIDINE SCH MG: 10 INJECTION INTRAVENOUS at 21:16

## 2020-01-10 RX ADMIN — DEXTROSE SCH MLS/HR: 50 INJECTION, SOLUTION INTRAVENOUS at 20:26

## 2020-01-10 RX ADMIN — MORPHINE SULFATE PRN MG: 10 INJECTION INTRAMUSCULAR; INTRAVENOUS; SUBCUTANEOUS at 18:10

## 2020-01-10 RX ADMIN — KETOROLAC TROMETHAMINE SCH MG: 30 INJECTION, SOLUTION INTRAMUSCULAR at 21:15

## 2020-01-10 NOTE — RADIOLOGY REPORT (SQ)
EXAM DESCRIPTION:  CHEST SINGLE VIEW



COMPLETED DATE/TIME:  1/10/2020 10:38 am



REASON FOR STUDY:  RE OP



COMPARISON:  1/3/2020



EXAM PARAMETERS:  NUMBER OF VIEWS: One view.

TECHNIQUE: Single frontal radiographic view of the chest acquired.

RADIATION DOSE: NA

LIMITATIONS: None.



FINDINGS:  LUNGS AND PLEURA: No opacities, masses or pneumothorax. No pleural effusion.

MEDIASTINUM AND HILAR STRUCTURES: Hiatal hernia.  There appear to be chronic interstitial changes in 
the left base.

HEART AND VASCULAR STRUCTURES: Heart normal in size.  Normal vasculature.

BONES: No acute findings.

HARDWARE: None in the chest.

OTHER: No other significant finding.



IMPRESSION:  No acute cardiopulmonary findings.  Hiatal hernia.



TECHNICAL DOCUMENTATION:  JOB ID:  [id number]

 2011 [employer]- All Rights Reserved



Reading location - IP/workstation name: [email or web address]

## 2020-01-11 LAB
ABSOLUTE LYMPHOCYTES# (MANUAL): 0.1 10^3/UL (ref 0.5–4.7)
ABSOLUTE MONOCYTES # (MANUAL): 0.4 10^3/UL (ref 0.1–1.4)
ADD MANUAL DIFF: YES
ANION GAP SERPL CALC-SCNC: 9 MMOL/L (ref 5–19)
ANISOCYTOSIS BLD QL SMEAR: (no result)
BASOPHILS NFR BLD MANUAL: 0 % (ref 0–2)
BUN SERPL-MCNC: 16 MG/DL (ref 7–20)
CALCIUM: 8.3 MG/DL (ref 8.4–10.2)
CHLORIDE SERPL-SCNC: 104 MMOL/L (ref 98–107)
CO2 SERPL-SCNC: 26 MMOL/L (ref 22–30)
DACRYOCYTES BLD QL SMEAR: SLIGHT
EOSINOPHIL NFR BLD MANUAL: 0 % (ref 0–6)
ERYTHROCYTE [DISTWIDTH] IN BLOOD BY AUTOMATED COUNT: 25.6 % (ref 11.5–14)
GLUCOSE SERPL-MCNC: 153 MG/DL (ref 75–110)
HCT VFR BLD CALC: 34.1 % (ref 36–47)
HGB BLD-MCNC: 11.1 G/DL (ref 12–15.5)
MCH RBC QN AUTO: 30.4 PG (ref 27–33.4)
MCHC RBC AUTO-ENTMCNC: 32.7 G/DL (ref 32–36)
MCV RBC AUTO: 93 FL (ref 80–97)
MONOCYTES % (MANUAL): 3 % (ref 3–13)
NEUTS BAND NFR BLD MANUAL: 1 % (ref 3–5)
OVALOCYTES BLD QL SMEAR: SLIGHT
PLATELET # BLD: 263 10^3/UL (ref 150–450)
PLATELET COMMENT: ADEQUATE
POIKILOCYTOSIS BLD QL SMEAR: SLIGHT
POTASSIUM SERPL-SCNC: 3.8 MMOL/L (ref 3.6–5)
RBC # BLD AUTO: 3.66 10^6/UL (ref 3.72–5.28)
SCHISTOCYTES BLD QL SMEAR: SLIGHT
SEGMENTED NEUTROPHILS % (MAN): 95 % (ref 42–78)
TOTAL CELLS COUNTED BLD: 100
TOXIC GRANULES BLD QL SMEAR: (no result)
VARIANT LYMPHS NFR BLD MANUAL: 1 % (ref 13–45)
WBC # BLD AUTO: 13.6 10^3/UL (ref 4–10.5)
WBC TOXIC VACUOLES BLD QL SMEAR: PRESENT

## 2020-01-11 RX ADMIN — MORPHINE SULFATE PRN MG: 10 INJECTION INTRAMUSCULAR; INTRAVENOUS; SUBCUTANEOUS at 03:21

## 2020-01-11 RX ADMIN — BUSPIRONE HYDROCHLORIDE SCH MG: 10 TABLET ORAL at 21:14

## 2020-01-11 RX ADMIN — DEXTROSE, SODIUM CHLORIDE, SODIUM LACTATE, POTASSIUM CHLORIDE, AND CALCIUM CHLORIDE PRN MLS/HR: 5; .6; .31; .03; .02 INJECTION, SOLUTION INTRAVENOUS at 13:21

## 2020-01-11 RX ADMIN — ENOXAPARIN SODIUM SCH MG: 40 INJECTION SUBCUTANEOUS at 09:27

## 2020-01-11 RX ADMIN — Medication SCH MLS/HR: at 05:05

## 2020-01-11 RX ADMIN — KETOROLAC TROMETHAMINE SCH MG: 30 INJECTION, SOLUTION INTRAMUSCULAR at 05:05

## 2020-01-11 RX ADMIN — BUSPIRONE HYDROCHLORIDE SCH MG: 10 TABLET ORAL at 13:10

## 2020-01-11 RX ADMIN — MORPHINE SULFATE PRN MG: 10 INJECTION INTRAMUSCULAR; INTRAVENOUS; SUBCUTANEOUS at 09:19

## 2020-01-11 RX ADMIN — Medication SCH MLS/HR: at 21:14

## 2020-01-11 RX ADMIN — FAMOTIDINE SCH MG: 10 INJECTION INTRAVENOUS at 09:24

## 2020-01-11 RX ADMIN — DEXTROSE, SODIUM CHLORIDE, SODIUM LACTATE, POTASSIUM CHLORIDE, AND CALCIUM CHLORIDE PRN MLS/HR: 5; .6; .31; .03; .02 INJECTION, SOLUTION INTRAVENOUS at 01:56

## 2020-01-11 RX ADMIN — KETOROLAC TROMETHAMINE SCH MG: 30 INJECTION, SOLUTION INTRAMUSCULAR at 21:13

## 2020-01-11 RX ADMIN — DEXTROSE SCH MLS/HR: 50 INJECTION, SOLUTION INTRAVENOUS at 03:21

## 2020-01-11 RX ADMIN — LEVOTHYROXINE SODIUM SCH MG: 100 TABLET ORAL at 09:19

## 2020-01-11 RX ADMIN — PANTOPRAZOLE SODIUM SCH MG: 20 TABLET, DELAYED RELEASE ORAL at 13:22

## 2020-01-11 RX ADMIN — SERTRALINE HYDROCHLORIDE SCH MG: 50 TABLET ORAL at 13:25

## 2020-01-11 RX ADMIN — DEXAMETHASONE SODIUM PHOSPHATE PRN MG: 10 INJECTION INTRAMUSCULAR; INTRAVENOUS at 13:10

## 2020-01-11 RX ADMIN — OXYCODONE HYDROCHLORIDE PRN MG: 5 TABLET ORAL at 13:10

## 2020-01-11 RX ADMIN — ATENOLOL SCH MG: 50 TABLET ORAL at 13:21

## 2020-01-11 RX ADMIN — FAMOTIDINE SCH MG: 10 INJECTION INTRAVENOUS at 21:13

## 2020-01-11 RX ADMIN — KETOROLAC TROMETHAMINE SCH MG: 30 INJECTION, SOLUTION INTRAMUSCULAR at 13:08

## 2020-01-11 RX ADMIN — Medication SCH MLS/HR: at 13:09

## 2020-01-11 NOTE — RADIOLOGY REPORT (SQ)
EXAM DESCRIPTION:  CHEST SINGLE VIEW



COMPLETED DATE/TIME:  1/11/2020 6:13 am



REASON FOR STUDY:  shortness of breath



COMPARISON:  1/10/2020



FINDINGS:  Single-view chest AP portable upright

Right port remains in place.

Compared to yesterday's film, diminished lung volumes with subsegmental atelectasis right mid lung zo
ne.

Stable cardiomediastinal silhouette, probable hiatal hernia.

No pneumothorax.



TECHNICAL DOCUMENTATION:  JOB ID:  [id number]



Reading location - IP/workstation name: [email or web address]

## 2020-01-11 NOTE — PDOC PROGRESS REPORT
Subjective


Progress Note for:: 01/11/20


Reason For Visit: 


MALIGNANT NEOPLASM OF RECTUM








Physical Exam


Vital Signs: 


                                        











Temp Pulse Resp BP Pulse Ox


 


 97.9 F   76   16   107/64   96 


 


 01/11/20 07:53  01/11/20 07:53  01/11/20 09:00  01/11/20 07:53  01/11/20 09:00








                                 Intake & Output











 01/10/20 01/11/20 01/12/20





 06:59 06:59 06:59


 


Intake Total  3250 


 


Output Total  2720 60


 


Balance  530 -60


 


Weight 87.092 kg 90.9 kg 














Results


Laboratory Results: 


                                        





                                 01/11/20 04:23 





                                 01/11/20 04:23 





                                        











  01/10/20 01/11/20 01/11/20





  13:16 04:23 04:23


 


WBC   13.6 H 


 


RBC   3.66 L 


 


Hgb   11.1 L 


 


Hct   34.1 L 


 


MCV   93 


 


MCH   30.4 


 


MCHC   32.7 


 


RDW   25.6 H 


 


Plt Count   263 


 


Seg Neutrophils %   Not Reportable 


 


Carbonic Acid  1.48 H  


 


HCO3/H2CO3 Ratio  16:1  


 


ABG pH  7.31 L  


 


ABG pCO2  49.1 H  


 


ABG pO2  57.5 L  


 


ABG HCO3  24.3 H  


 


ABG O2 Saturation  87.2 L  


 


ABG Base Excess  -2.2  


 


FiO2  100%  


 


Sodium    138.8


 


Potassium    3.8


 


Chloride    104


 


Carbon Dioxide    26


 


Anion Gap    9


 


BUN    16


 


Creatinine    0.85


 


Est GFR ( Amer)    > 60


 


Glucose    153 H


 


Calcium    8.3 L














Assessment & Plan





- Diagnosis


(1) Rectal cancer


Is this a current diagnosis for this admission?: Yes   





(2) Obesity (BMI 30-39.9)


Is this a current diagnosis for this admission?: Yes   





(3) COPD (chronic obstructive pulmonary disease)


Qualifiers: 


   COPD type: unspecified COPD   Qualified Code(s): J44.9 - Chronic obstructive 

pulmonary disease, unspecified   


Is this a current diagnosis for this admission?: Yes   





- Time


Time Spent with patient: Less than 15 minutes





- Plan Summary


Plan Summary: 





This is a 64-year-old female status post robot-assisted laparoscopic low 

anterior resection for rectal cancer.  The patient is doing well.  She is 

satting well on a nasal cannula.  She complains of pain, however her pain 

medication regimen is effective.  She has had serosanguineous output from her TD

drain.  Her right lower quadrant ileostomy is dusky, but appears viable.  Out of

bed today.  Maintain Patel due to low pelvic surgery and high risk for urinary 

retention.  Transfer to the floor today.  Physical therapy consult.  Maintain 

full liquid diet and IV fluids today.  Repeat labs tomorrow.

## 2020-01-12 LAB
ADD MANUAL DIFF: (no result)
ANION GAP SERPL CALC-SCNC: 11 MMOL/L (ref 5–19)
ANISOCYTOSIS BLD QL SMEAR: (no result)
BASOPHILS # BLD AUTO: 0 10^3/UL (ref 0–0.2)
BASOPHILS NFR BLD AUTO: 0.3 % (ref 0–2)
BUN SERPL-MCNC: 15 MG/DL (ref 7–20)
CALCIUM: 8.6 MG/DL (ref 8.4–10.2)
CHLORIDE SERPL-SCNC: 102 MMOL/L (ref 98–107)
CO2 SERPL-SCNC: 25 MMOL/L (ref 22–30)
DACRYOCYTES BLD QL SMEAR: SLIGHT
EOSINOPHIL # BLD AUTO: 0.1 10^3/UL (ref 0–0.6)
EOSINOPHIL NFR BLD AUTO: 0.7 % (ref 0–6)
ERYTHROCYTE [DISTWIDTH] IN BLOOD BY AUTOMATED COUNT: 24.9 % (ref 11.5–14)
GLUCOSE SERPL-MCNC: 124 MG/DL (ref 75–110)
HCT VFR BLD CALC: 29.9 % (ref 36–47)
HGB BLD-MCNC: 10 G/DL (ref 12–15.5)
LYMPHOCYTES # BLD AUTO: 0.5 10^3/UL (ref 0.5–4.7)
LYMPHOCYTES NFR BLD AUTO: 5.4 % (ref 13–45)
MCH RBC QN AUTO: 31.3 PG (ref 27–33.4)
MCHC RBC AUTO-ENTMCNC: 33.4 G/DL (ref 32–36)
MCV RBC AUTO: 94 FL (ref 80–97)
MONOCYTES # BLD AUTO: 0.8 10^3/UL (ref 0.1–1.4)
MONOCYTES NFR BLD AUTO: 8.3 % (ref 3–13)
NEUTROPHILS # BLD AUTO: 8 10^3/UL (ref 1.7–8.2)
NEUTS SEG NFR BLD AUTO: 85.3 % (ref 42–78)
OVALOCYTES BLD QL SMEAR: SLIGHT
PLATELET # BLD: 215 10^3/UL (ref 150–450)
PLATELET COMMENT: ADEQUATE
POIKILOCYTOSIS BLD QL SMEAR: SLIGHT
POTASSIUM SERPL-SCNC: 3.6 MMOL/L (ref 3.6–5)
RBC # BLD AUTO: 3.19 10^6/UL (ref 3.72–5.28)
SCHISTOCYTES BLD QL SMEAR: SLIGHT
TOTAL CELLS COUNTED % (AUTO): 100 %
WBC # BLD AUTO: 9.4 10^3/UL (ref 4–10.5)

## 2020-01-12 RX ADMIN — Medication SCH MLS/HR: at 05:49

## 2020-01-12 RX ADMIN — DEXAMETHASONE SODIUM PHOSPHATE PRN MG: 10 INJECTION INTRAMUSCULAR; INTRAVENOUS at 18:50

## 2020-01-12 RX ADMIN — PANTOPRAZOLE SODIUM SCH MG: 20 TABLET, DELAYED RELEASE ORAL at 09:32

## 2020-01-12 RX ADMIN — KETOROLAC TROMETHAMINE SCH MG: 30 INJECTION, SOLUTION INTRAMUSCULAR at 14:51

## 2020-01-12 RX ADMIN — ENOXAPARIN SODIUM SCH MG: 40 INJECTION SUBCUTANEOUS at 09:35

## 2020-01-12 RX ADMIN — OXYCODONE HYDROCHLORIDE PRN MG: 5 TABLET ORAL at 02:46

## 2020-01-12 RX ADMIN — OXYCODONE HYDROCHLORIDE PRN MG: 5 TABLET ORAL at 19:33

## 2020-01-12 RX ADMIN — BUSPIRONE HYDROCHLORIDE SCH MG: 10 TABLET ORAL at 21:46

## 2020-01-12 RX ADMIN — BUSPIRONE HYDROCHLORIDE SCH MG: 10 TABLET ORAL at 14:51

## 2020-01-12 RX ADMIN — Medication SCH MLS/HR: at 21:45

## 2020-01-12 RX ADMIN — OXYCODONE HYDROCHLORIDE PRN MG: 5 TABLET ORAL at 08:11

## 2020-01-12 RX ADMIN — KETOROLAC TROMETHAMINE SCH MG: 30 INJECTION, SOLUTION INTRAMUSCULAR at 05:50

## 2020-01-12 RX ADMIN — ATENOLOL SCH MG: 50 TABLET ORAL at 09:32

## 2020-01-12 RX ADMIN — FAMOTIDINE SCH MG: 10 INJECTION INTRAVENOUS at 09:35

## 2020-01-12 RX ADMIN — BUSPIRONE HYDROCHLORIDE SCH MG: 10 TABLET ORAL at 05:50

## 2020-01-12 RX ADMIN — Medication SCH MLS/HR: at 14:52

## 2020-01-12 RX ADMIN — KETOROLAC TROMETHAMINE SCH MG: 30 INJECTION, SOLUTION INTRAMUSCULAR at 21:45

## 2020-01-12 RX ADMIN — FAMOTIDINE SCH MG: 10 INJECTION INTRAVENOUS at 21:46

## 2020-01-12 RX ADMIN — OXYCODONE HYDROCHLORIDE PRN MG: 5 TABLET ORAL at 15:01

## 2020-01-12 RX ADMIN — SERTRALINE HYDROCHLORIDE SCH MG: 50 TABLET ORAL at 09:31

## 2020-01-12 RX ADMIN — LEVOTHYROXINE SODIUM SCH MG: 100 TABLET ORAL at 08:10

## 2020-01-12 RX ADMIN — DEXTROSE, SODIUM CHLORIDE, SODIUM LACTATE, POTASSIUM CHLORIDE, AND CALCIUM CHLORIDE PRN MLS/HR: 5; .6; .31; .03; .02 INJECTION, SOLUTION INTRAVENOUS at 19:35

## 2020-01-12 NOTE — PDOC PROGRESS REPORT
Subjective


Reason For Visit: 


MALIGNANT NEOPLASM OF RECTUM








Physical Exam


Vital Signs: 


                                        











Temp Pulse Resp BP Pulse Ox


 


 98.1 F   74   20   105/87 H  95 


 


 01/12/20 07:28  01/12/20 07:28  01/12/20 07:28  01/12/20 07:28  01/12/20 07:28








                                 Intake & Output











 01/11/20 01/12/20 01/13/20





 06:59 06:59 06:59


 


Intake Total 3250 2166 


 


Output Total 2720 1075 10


 


Balance 530 1091 -10


 


Weight 90.9 kg 88.6 kg 














Results


Laboratory Results: 


                                        





                                 01/12/20 05:02 





                                 01/12/20 05:02 





                                        











  01/12/20 01/12/20





  05:02 05:02


 


WBC  9.4 


 


RBC  3.19 L 


 


Hgb  10.0 L 


 


Hct  29.9 L 


 


MCV  94 


 


MCH  31.3 


 


MCHC  33.4 


 


RDW  24.9 H 


 


Plt Count  215 


 


Seg Neutrophils %  85.3 H 


 


Sodium   137.5


 


Potassium   3.6


 


Chloride   102


 


Carbon Dioxide   25


 


Anion Gap   11


 


BUN   15


 


Creatinine   0.85


 


Est GFR (African Amer)   > 60


 


Glucose   124 H


 


Calcium   8.6














Assessment & Plan





- Diagnosis


(1) Rectal cancer


Is this a current diagnosis for this admission?: Yes   





(2) Obesity (BMI 30-39.9)


Is this a current diagnosis for this admission?: Yes   





(3) COPD (chronic obstructive pulmonary disease)


Qualifiers: 


   COPD type: unspecified COPD   Qualified Code(s): J44.9 - Chronic obstructive 

pulmonary disease, unspecified   


Is this a current diagnosis for this admission?: Yes   





- Time


Time Spent with patient: Less than 15 minutes





- Plan Summary


Plan Summary: 





This is a 64-year-old female status post robot-assisted laparoscopic low 

anterior resection for rectal cancer.  The patient is doing well.  She is 

satting well on a nasal cannula.  She complains of pain, however her pain 

medication regimen is effective.  She has had serosanguineous output from her TD

drain.  Her right lower quadrant ileostomy is pink today and productive.  She is

refusing to get out of bed.  It is essential that she get out of bed today, and 

ambulate in the hallways.  The patient's Patel catheter was removed yesterday, 

without an order from a physician.  Patients with low pelvic surgery are high 

risk for urinary retention.  I will have the nursing staff perform bladder scans

every 6 hours to ensure that the patient is not experiencing urinary retention. 

Continue with physical therapy.  Advance diet as patient tolerates.

## 2020-01-13 RX ADMIN — PANTOPRAZOLE SODIUM SCH MG: 20 TABLET, DELAYED RELEASE ORAL at 09:47

## 2020-01-13 RX ADMIN — ENOXAPARIN SODIUM SCH MG: 40 INJECTION SUBCUTANEOUS at 09:48

## 2020-01-13 RX ADMIN — OXYCODONE HYDROCHLORIDE PRN MG: 5 TABLET ORAL at 21:40

## 2020-01-13 RX ADMIN — ASPIRIN SCH MG: 81 TABLET, COATED ORAL at 09:47

## 2020-01-13 RX ADMIN — DEXTROSE, SODIUM CHLORIDE, SODIUM LACTATE, POTASSIUM CHLORIDE, AND CALCIUM CHLORIDE PRN MLS/HR: 5; .6; .31; .03; .02 INJECTION, SOLUTION INTRAVENOUS at 12:38

## 2020-01-13 RX ADMIN — LEVOTHYROXINE SODIUM SCH MG: 100 TABLET ORAL at 09:47

## 2020-01-13 RX ADMIN — KETOROLAC TROMETHAMINE SCH MG: 30 INJECTION, SOLUTION INTRAMUSCULAR at 13:28

## 2020-01-13 RX ADMIN — BUSPIRONE HYDROCHLORIDE SCH MG: 10 TABLET ORAL at 13:29

## 2020-01-13 RX ADMIN — BUSPIRONE HYDROCHLORIDE SCH MG: 10 TABLET ORAL at 06:23

## 2020-01-13 RX ADMIN — BUSPIRONE HYDROCHLORIDE SCH MG: 10 TABLET ORAL at 21:40

## 2020-01-13 RX ADMIN — Medication SCH MLS/HR: at 06:23

## 2020-01-13 RX ADMIN — SERTRALINE HYDROCHLORIDE SCH MG: 50 TABLET ORAL at 09:46

## 2020-01-13 RX ADMIN — OXYCODONE HYDROCHLORIDE PRN MG: 5 TABLET ORAL at 12:37

## 2020-01-13 RX ADMIN — Medication SCH MLS/HR: at 13:29

## 2020-01-13 RX ADMIN — ATORVASTATIN CALCIUM SCH MG: 10 TABLET, FILM COATED ORAL at 21:39

## 2020-01-13 RX ADMIN — FAMOTIDINE SCH MG: 10 INJECTION INTRAVENOUS at 21:38

## 2020-01-13 RX ADMIN — ATENOLOL SCH MG: 50 TABLET ORAL at 09:47

## 2020-01-13 RX ADMIN — KETOROLAC TROMETHAMINE SCH MG: 30 INJECTION, SOLUTION INTRAMUSCULAR at 21:39

## 2020-01-13 RX ADMIN — FAMOTIDINE SCH MG: 10 INJECTION INTRAVENOUS at 09:46

## 2020-01-13 RX ADMIN — KETOROLAC TROMETHAMINE SCH MG: 30 INJECTION, SOLUTION INTRAMUSCULAR at 06:23

## 2020-01-13 NOTE — PDOC PROGRESS REPORT
Subjective


Progress Note for:: 01/13/20


Reason For Visit: 


MALIGNANT NEOPLASM OF RECTUM








Physical Exam


Vital Signs: 


                                        











Temp Pulse Resp BP Pulse Ox


 


 97.8 F   61   19   129/73 H  92 


 


 01/13/20 14:59  01/13/20 14:59  01/13/20 14:59  01/13/20 14:59  01/13/20 14:59








                                 Intake & Output











 01/12/20 01/13/20 01/14/20





 06:59 06:59 06:59


 


Intake Total 3266 2280 318


 


Output Total 1075 2205 300


 


Balance 2191 75 18


 


Weight 88.6 kg 91.4 kg 














Results


Laboratory Results: 


                                        





                                 01/12/20 05:02 





                                 01/12/20 05:02 











Assessment & Plan





- Diagnosis


(1) Rectal cancer


Is this a current diagnosis for this admission?: Yes   





(2) Obesity (BMI 30-39.9)


Is this a current diagnosis for this admission?: Yes   





(3) COPD (chronic obstructive pulmonary disease)


Qualifiers: 


   COPD type: unspecified COPD   Qualified Code(s): J44.9 - Chronic obstructive 

pulmonary disease, unspecified   


Is this a current diagnosis for this admission?: Yes   





- Time


Time Spent with patient: Less than 15 minutes





- Plan Summary


Plan Summary: 





This is a 64-year-old female status post robot-assisted laparoscopic low 

anterior resection for rectal cancer.  The patient is doing well.  She is 

satting well on a nasal cannula.  She complains of pain, however her pain 

medication regimen is effective.  She has had serosanguineous output from her TD

drain.  Her right lower quadrant ileostomy is pink centrally and productive.  It

is still difficult to get her out of bed.  It is essential that she get out of 

bed and ambulate in the hallways.  Aggressive pulmonary toilet. The patient is 

voiding without difficulty. Continue with physical therapy.  Advance diet as 

patient tolerates.

## 2020-01-14 RX ADMIN — ALPRAZOLAM PRN MG: 0.5 TABLET ORAL at 21:38

## 2020-01-14 RX ADMIN — ALPRAZOLAM PRN MG: 0.5 TABLET ORAL at 11:08

## 2020-01-14 RX ADMIN — KETOROLAC TROMETHAMINE SCH MG: 30 INJECTION, SOLUTION INTRAMUSCULAR at 21:38

## 2020-01-14 RX ADMIN — ASPIRIN SCH MG: 81 TABLET, COATED ORAL at 09:45

## 2020-01-14 RX ADMIN — MORPHINE SULFATE PRN MG: 10 INJECTION INTRAMUSCULAR; INTRAVENOUS; SUBCUTANEOUS at 05:04

## 2020-01-14 RX ADMIN — PANTOPRAZOLE SODIUM SCH MG: 20 TABLET, DELAYED RELEASE ORAL at 09:46

## 2020-01-14 RX ADMIN — ATENOLOL SCH MG: 50 TABLET ORAL at 09:46

## 2020-01-14 RX ADMIN — SERTRALINE HYDROCHLORIDE SCH MG: 50 TABLET ORAL at 09:46

## 2020-01-14 RX ADMIN — OXYCODONE HYDROCHLORIDE PRN MG: 5 TABLET ORAL at 09:46

## 2020-01-14 RX ADMIN — BUSPIRONE HYDROCHLORIDE SCH MG: 10 TABLET ORAL at 21:37

## 2020-01-14 RX ADMIN — BUSPIRONE HYDROCHLORIDE SCH MG: 10 TABLET ORAL at 15:01

## 2020-01-14 RX ADMIN — LEVOTHYROXINE SODIUM SCH MG: 100 TABLET ORAL at 07:55

## 2020-01-14 RX ADMIN — ATORVASTATIN CALCIUM SCH MG: 10 TABLET, FILM COATED ORAL at 21:37

## 2020-01-14 RX ADMIN — OXYCODONE HYDROCHLORIDE PRN MG: 5 TABLET ORAL at 04:30

## 2020-01-14 RX ADMIN — ENOXAPARIN SODIUM SCH MG: 40 INJECTION SUBCUTANEOUS at 09:49

## 2020-01-14 RX ADMIN — KETOROLAC TROMETHAMINE SCH: 30 INJECTION, SOLUTION INTRAMUSCULAR at 06:02

## 2020-01-14 RX ADMIN — KETOROLAC TROMETHAMINE SCH MG: 30 INJECTION, SOLUTION INTRAMUSCULAR at 15:01

## 2020-01-14 RX ADMIN — BUSPIRONE HYDROCHLORIDE SCH MG: 10 TABLET ORAL at 06:02

## 2020-01-14 NOTE — PDOC PROGRESS REPORT
Subjective


Progress Note for:: 01/14/20


Reason For Visit: 


MALIGNANT NEOPLASM OF RECTUM








Physical Exam


Vital Signs: 


                                        











Temp Pulse Resp BP Pulse Ox


 


 98.0 F   60   18   143/81 H  93 


 


 01/14/20 11:02  01/14/20 11:02  01/14/20 11:02  01/14/20 11:02  01/14/20 11:02








                                 Intake & Output











 01/13/20 01/14/20 01/15/20





 06:59 06:59 06:59


 


Intake Total 2280 1238 


 


Output Total 2205 1790 


 


Balance 75 -552 


 


Weight 91.4 kg 92.5 kg 














Results


Laboratory Results: 


                                        





                                 01/12/20 05:02 





                                 01/12/20 05:02 











Assessment & Plan





- Diagnosis


(1) Rectal cancer


Is this a current diagnosis for this admission?: Yes   





(2) Obesity (BMI 30-39.9)


Is this a current diagnosis for this admission?: Yes   





(3) COPD (chronic obstructive pulmonary disease)


Qualifiers: 


   COPD type: unspecified COPD   Qualified Code(s): J44.9 - Chronic obstructive 

pulmonary disease, unspecified   


Is this a current diagnosis for this admission?: Yes   





- Time


Time Spent with patient: Less than 15 minutes





- Plan Summary


Plan Summary: 





This is a 64-year-old female status post robot-assisted laparoscopic low 

anterior resection for rectal cancer.  The patient is doing well.  She denies 

shortness of breath.  She complains of pain, however her pain medication regimen

is effective.  She has had serosanguineous output from her TD drain.  Her right 

lower quadrant ileostomy is pink centrally and productive.  It is still 

difficult to get her out of bed.  


She is resisting the nurses attempts to make her ambulate.  Aggressive pulmonary

toilet. The patient is voiding without difficulty. Continue with physical 

therapy.  Advance diet as patient tolerates.





Her resting and ambulatory O2 sat was checked by the nursing staff, and she does

not qualify for home O2 at this time.

## 2020-01-15 LAB
ADD MANUAL DIFF: (no result)
ANION GAP SERPL CALC-SCNC: 7 MMOL/L (ref 5–19)
ANISOCYTOSIS BLD QL SMEAR: (no result)
BASOPHILS # BLD AUTO: 0 10^3/UL (ref 0–0.2)
BASOPHILS NFR BLD AUTO: 0.3 % (ref 0–2)
BUN SERPL-MCNC: 11 MG/DL (ref 7–20)
CALCIUM: 9.5 MG/DL (ref 8.4–10.2)
CHLORIDE SERPL-SCNC: 104 MMOL/L (ref 98–107)
CO2 SERPL-SCNC: 29 MMOL/L (ref 22–30)
DACRYOCYTES BLD QL SMEAR: SLIGHT
EOSINOPHIL # BLD AUTO: 0.1 10^3/UL (ref 0–0.6)
EOSINOPHIL NFR BLD AUTO: 1.6 % (ref 0–6)
ERYTHROCYTE [DISTWIDTH] IN BLOOD BY AUTOMATED COUNT: 22.4 % (ref 11.5–14)
GLUCOSE SERPL-MCNC: 106 MG/DL (ref 75–110)
HCT VFR BLD CALC: 30.1 % (ref 36–47)
HGB BLD-MCNC: 10.2 G/DL (ref 12–15.5)
LYMPHOCYTES # BLD AUTO: 0.5 10^3/UL (ref 0.5–4.7)
LYMPHOCYTES NFR BLD AUTO: 8.3 % (ref 13–45)
MCH RBC QN AUTO: 31.6 PG (ref 27–33.4)
MCHC RBC AUTO-ENTMCNC: 33.9 G/DL (ref 32–36)
MCV RBC AUTO: 93 FL (ref 80–97)
MONOCYTES # BLD AUTO: 0.9 10^3/UL (ref 0.1–1.4)
MONOCYTES NFR BLD AUTO: 13.6 % (ref 3–13)
NEUTROPHILS # BLD AUTO: 4.9 10^3/UL (ref 1.7–8.2)
NEUTS SEG NFR BLD AUTO: 76.2 % (ref 42–78)
OVALOCYTES BLD QL SMEAR: SLIGHT
PLATELET # BLD: 237 10^3/UL (ref 150–450)
PLATELET COMMENT: ADEQUATE
POIKILOCYTOSIS BLD QL SMEAR: SLIGHT
POLYCHROMASIA BLD QL SMEAR: SLIGHT
POTASSIUM SERPL-SCNC: 4 MMOL/L (ref 3.6–5)
RBC # BLD AUTO: 3.22 10^6/UL (ref 3.72–5.28)
TOTAL CELLS COUNTED % (AUTO): 100 %
TOXIC GRANULES BLD QL SMEAR: SLIGHT
WBC # BLD AUTO: 6.4 10^3/UL (ref 4–10.5)

## 2020-01-15 RX ADMIN — ALPRAZOLAM PRN MG: 0.5 TABLET ORAL at 06:38

## 2020-01-15 RX ADMIN — KETOROLAC TROMETHAMINE SCH MG: 30 INJECTION, SOLUTION INTRAMUSCULAR at 05:09

## 2020-01-15 RX ADMIN — BUSPIRONE HYDROCHLORIDE SCH MG: 10 TABLET ORAL at 21:34

## 2020-01-15 RX ADMIN — ALPRAZOLAM PRN MG: 0.5 TABLET ORAL at 13:31

## 2020-01-15 RX ADMIN — PANTOPRAZOLE SODIUM SCH MG: 20 TABLET, DELAYED RELEASE ORAL at 10:41

## 2020-01-15 RX ADMIN — SERTRALINE HYDROCHLORIDE SCH MG: 50 TABLET ORAL at 10:41

## 2020-01-15 RX ADMIN — ATENOLOL SCH MG: 50 TABLET ORAL at 10:39

## 2020-01-15 RX ADMIN — ALPRAZOLAM PRN MG: 0.5 TABLET ORAL at 21:34

## 2020-01-15 RX ADMIN — BUSPIRONE HYDROCHLORIDE SCH MG: 10 TABLET ORAL at 13:24

## 2020-01-15 RX ADMIN — ATORVASTATIN CALCIUM SCH MG: 10 TABLET, FILM COATED ORAL at 21:35

## 2020-01-15 RX ADMIN — ASPIRIN SCH MG: 81 TABLET, COATED ORAL at 10:35

## 2020-01-15 RX ADMIN — BUSPIRONE HYDROCHLORIDE SCH MG: 10 TABLET ORAL at 05:10

## 2020-01-15 RX ADMIN — ENOXAPARIN SODIUM SCH MG: 40 INJECTION SUBCUTANEOUS at 10:31

## 2020-01-15 RX ADMIN — OXYCODONE HYDROCHLORIDE PRN MG: 5 TABLET ORAL at 05:10

## 2020-01-15 RX ADMIN — LEVOTHYROXINE SODIUM SCH MG: 100 TABLET ORAL at 10:37

## 2020-01-15 RX ADMIN — KETOROLAC TROMETHAMINE SCH MG: 30 INJECTION, SOLUTION INTRAMUSCULAR at 13:25

## 2020-01-15 RX ADMIN — OXYCODONE HYDROCHLORIDE PRN MG: 5 TABLET ORAL at 18:56

## 2020-01-15 NOTE — PDOC PROGRESS REPORT
Subjective


Progress Note for:: 01/15/20


Reason For Visit: 


MALIGNANT NEOPLASM OF RECTUM








Physical Exam


Vital Signs: 


                                        











Temp Pulse Resp BP Pulse Ox


 


 98.1 F   82   18   105/69   92 


 


 01/15/20 11:37  01/15/20 11:37  01/15/20 11:37  01/15/20 11:37  01/15/20 11:37








                                 Intake & Output











 01/14/20 01/15/20 01/16/20





 06:59 06:59 06:59


 


Intake Total 1238 1102 


 


Output Total 1790 2020 


 


Balance -552 -918 


 


Weight 92.5 kg 92.6 kg 














Results


Laboratory Results: 


                                        





                                 01/15/20 04:46 





                                 01/15/20 04:46 





                                        











  01/15/20 01/15/20





  04:46 04:46


 


WBC  6.4 


 


RBC  3.22 L 


 


Hgb  10.2 L 


 


Hct  30.1 L 


 


MCV  93 


 


MCH  31.6 


 


MCHC  33.9 


 


RDW  22.4 H 


 


Plt Count  237 


 


Seg Neutrophils %  76.2 


 


Sodium   140.4


 


Potassium   4.0


 


Chloride   104


 


Carbon Dioxide   29


 


Anion Gap   7


 


BUN   11


 


Creatinine   0.79


 


Est GFR (African Amer)   > 60


 


Glucose   106


 


Calcium   9.5














Assessment & Plan





- Diagnosis


(1) Rectal cancer


Is this a current diagnosis for this admission?: Yes   





(2) Obesity (BMI 30-39.9)


Is this a current diagnosis for this admission?: Yes   





(3) COPD (chronic obstructive pulmonary disease)


Qualifiers: 


   COPD type: unspecified COPD   Qualified Code(s): J44.9 - Chronic obstructive 

pulmonary disease, unspecified   


Is this a current diagnosis for this admission?: Yes   





- Time


Time Spent with patient: Less than 15 minutes





- Plan Summary


Plan Summary: 





This is a 64-year-old female status post robot-assisted laparoscopic low 

anterior resection for rectal cancer.  The patient is doing well, she is resting

comfortably.  She denies shortness of breath.  She complains of pain, however 

her pain medication regimen is effective.  She has had serosanguineous output 

from her TD drain.  Her right lower quadrant ileostomy is pink centrally and 

productive.  It is still difficult to get her out of bed.  She is more amenable 

to working with the nurses to ambulate.  Aggressive pulmonary toilet. The 

patient is voiding without difficulty. Continue with ambulation. 





Her resting and ambulatory O2 sat was checked by the nursing staff, and she does

not qualify for home O2 at this time.

## 2020-01-16 RX ADMIN — BUSPIRONE HYDROCHLORIDE SCH MG: 10 TABLET ORAL at 21:08

## 2020-01-16 RX ADMIN — ATORVASTATIN CALCIUM SCH MG: 10 TABLET, FILM COATED ORAL at 21:08

## 2020-01-16 RX ADMIN — BUSPIRONE HYDROCHLORIDE SCH MG: 10 TABLET ORAL at 05:49

## 2020-01-16 RX ADMIN — ALPRAZOLAM PRN MG: 0.5 TABLET ORAL at 05:50

## 2020-01-16 RX ADMIN — SERTRALINE HYDROCHLORIDE SCH MG: 50 TABLET ORAL at 09:31

## 2020-01-16 RX ADMIN — ATENOLOL SCH MG: 50 TABLET ORAL at 09:32

## 2020-01-16 RX ADMIN — ASPIRIN SCH MG: 81 TABLET, COATED ORAL at 09:31

## 2020-01-16 RX ADMIN — ALPRAZOLAM PRN MG: 0.5 TABLET ORAL at 19:02

## 2020-01-16 RX ADMIN — DEXAMETHASONE SODIUM PHOSPHATE PRN MG: 10 INJECTION INTRAMUSCULAR; INTRAVENOUS at 18:56

## 2020-01-16 RX ADMIN — OXYCODONE HYDROCHLORIDE PRN MG: 5 TABLET ORAL at 23:58

## 2020-01-16 RX ADMIN — OXYCODONE HYDROCHLORIDE PRN MG: 5 TABLET ORAL at 09:33

## 2020-01-16 RX ADMIN — BUSPIRONE HYDROCHLORIDE SCH MG: 10 TABLET ORAL at 14:25

## 2020-01-16 RX ADMIN — DEXAMETHASONE SODIUM PHOSPHATE PRN MG: 10 INJECTION INTRAMUSCULAR; INTRAVENOUS at 09:35

## 2020-01-16 RX ADMIN — ENOXAPARIN SODIUM SCH MG: 40 INJECTION SUBCUTANEOUS at 09:35

## 2020-01-16 RX ADMIN — OXYCODONE HYDROCHLORIDE PRN MG: 5 TABLET ORAL at 02:01

## 2020-01-16 RX ADMIN — PANTOPRAZOLE SODIUM SCH MG: 20 TABLET, DELAYED RELEASE ORAL at 09:31

## 2020-01-16 RX ADMIN — LEVOTHYROXINE SODIUM SCH MG: 100 TABLET ORAL at 09:26

## 2020-01-16 NOTE — PDOC PROGRESS REPORT
Subjective


Progress Note for:: 01/16/20


Reason For Visit: 


MALIGNANT NEOPLASM OF RECTUM








Physical Exam


Vital Signs: 


                                        











Temp Pulse Resp BP Pulse Ox


 


 98.1 F   74   17   144/89 H  97 


 


 01/16/20 10:56  01/16/20 10:56  01/16/20 10:56  01/16/20 10:56  01/16/20 10:56








                                 Intake & Output











 01/15/20 01/16/20 01/17/20





 06:59 06:59 06:59


 


Intake Total 1102 1100 


 


Output Total 2020 1300 


 


Balance -918 -200 


 


Weight 92.6 kg 92.5 kg 














Results


Laboratory Results: 


                                        





                                 01/15/20 04:46 





                                 01/15/20 04:46 











Assessment & Plan





- Diagnosis


(1) Rectal cancer


Is this a current diagnosis for this admission?: Yes   





(2) Obesity (BMI 30-39.9)


Is this a current diagnosis for this admission?: Yes   





(3) COPD (chronic obstructive pulmonary disease)


Qualifiers: 


   COPD type: unspecified COPD   Qualified Code(s): J44.9 - Chronic obstructive 

pulmonary disease, unspecified   


Is this a current diagnosis for this admission?: Yes   





- Time


Time Spent with patient: Less than 15 minutes





- Plan Summary


Plan Summary: 





This is a 64-year-old female status post robot-assisted laparoscopic low 

anterior resection for rectal cancer.  The patient is doing well, she is resting

comfortably.  She denies shortness of breath.  She complains of pain, however 

her pain medication regimen is effective.  She has had serosanguineous output 

from her TD drain.  Her right lower quadrant ileostomy is pink centrally and 

productive. She is more amenable to working with the nurses to ambulate.  Aggr

essive pulmonary toilet. The patient is voiding without difficulty. Continue 

with ambulation. 





Her resting and ambulatory O2 sat was checked by the nursing staff, and she does

not qualify for home O2 at this time.





The nursing staff has expressed concerns about the patient's ability to care for

herself at home.  I will ask  to perform an evaluation regarding 

SNF placement.  Okay for discharge once discharge planning has been completed.

## 2020-01-17 LAB
ALBUMIN SERPL-MCNC: 3.5 G/DL (ref 3.5–5)
ALP SERPL-CCNC: 179 U/L (ref 38–126)
ANION GAP SERPL CALC-SCNC: 12 MMOL/L (ref 5–19)
AST SERPL-CCNC: 46 U/L (ref 14–36)
BILIRUB DIRECT SERPL-MCNC: 0.4 MG/DL (ref 0–0.4)
BILIRUB SERPL-MCNC: 0.4 MG/DL (ref 0.2–1.3)
BUN SERPL-MCNC: 14 MG/DL (ref 7–20)
CALCIUM: 9.9 MG/DL (ref 8.4–10.2)
CHLORIDE SERPL-SCNC: 100 MMOL/L (ref 98–107)
CO2 SERPL-SCNC: 28 MMOL/L (ref 22–30)
GLUCOSE SERPL-MCNC: 112 MG/DL (ref 75–110)
POTASSIUM SERPL-SCNC: 4.2 MMOL/L (ref 3.6–5)
PROT SERPL-MCNC: 6.7 G/DL (ref 6.3–8.2)

## 2020-01-17 RX ADMIN — DEXAMETHASONE SODIUM PHOSPHATE PRN MG: 10 INJECTION INTRAMUSCULAR; INTRAVENOUS at 09:24

## 2020-01-17 RX ADMIN — ATORVASTATIN CALCIUM SCH MG: 10 TABLET, FILM COATED ORAL at 21:46

## 2020-01-17 RX ADMIN — OXYCODONE HYDROCHLORIDE PRN MG: 5 TABLET ORAL at 09:34

## 2020-01-17 RX ADMIN — ASPIRIN SCH MG: 81 TABLET, COATED ORAL at 09:24

## 2020-01-17 RX ADMIN — BUSPIRONE HYDROCHLORIDE SCH MG: 10 TABLET ORAL at 21:46

## 2020-01-17 RX ADMIN — SERTRALINE HYDROCHLORIDE SCH MG: 50 TABLET ORAL at 09:25

## 2020-01-17 RX ADMIN — ATENOLOL SCH MG: 50 TABLET ORAL at 09:26

## 2020-01-17 RX ADMIN — ENOXAPARIN SODIUM SCH MG: 40 INJECTION SUBCUTANEOUS at 09:31

## 2020-01-17 RX ADMIN — ALPRAZOLAM PRN MG: 0.5 TABLET ORAL at 06:43

## 2020-01-17 RX ADMIN — ALPRAZOLAM PRN MG: 0.5 TABLET ORAL at 19:31

## 2020-01-17 RX ADMIN — BUSPIRONE HYDROCHLORIDE SCH MG: 10 TABLET ORAL at 13:42

## 2020-01-17 RX ADMIN — OXYCODONE HYDROCHLORIDE PRN MG: 5 TABLET ORAL at 16:07

## 2020-01-17 RX ADMIN — PANTOPRAZOLE SODIUM SCH MG: 20 TABLET, DELAYED RELEASE ORAL at 09:25

## 2020-01-17 RX ADMIN — LEVOTHYROXINE SODIUM SCH MG: 100 TABLET ORAL at 08:34

## 2020-01-17 RX ADMIN — BUSPIRONE HYDROCHLORIDE SCH MG: 10 TABLET ORAL at 06:43

## 2020-01-17 NOTE — PDOC PROGRESS REPORT
Subjective


Progress Note for:: 01/17/20


Reason For Visit: 


MALIGNANT NEOPLASM OF RECTUM








Physical Exam


Vital Signs: 


                                        











Temp Pulse Resp BP Pulse Ox


 


 98.1 F   77   19   119/89 H  92 


 


 01/17/20 07:35  01/17/20 07:35  01/17/20 07:35  01/17/20 07:35  01/17/20 07:35








                                 Intake & Output











 01/16/20 01/17/20 01/18/20





 06:59 06:59 06:59


 


Intake Total 1100 576 


 


Output Total 1300 1105 40


 


Balance -200 -529 -40


 


Weight 92.5 kg 92.4 kg 














Results


Laboratory Results: 


                                        





                                 01/15/20 04:46 





                                 01/17/20 07:55 





                                        











  01/17/20





  07:55


 


Sodium  139.5


 


Potassium  4.2


 


Chloride  100


 


Carbon Dioxide  28


 


Anion Gap  12


 


BUN  14


 


Creatinine  0.69


 


Est GFR (African Amer)  > 60


 


Glucose  112 H


 


Calcium  9.9


 


Total Bilirubin  0.4


 


AST  46 H


 


Alkaline Phosphatase  179 H


 


Total Protein  6.7


 


Albumin  3.5











Impressions: 


                                        





KUB X-Ray  01/17/20 07:00


IMPRESSION:  1. Air-filled dilated loop of small bowel in the left hemiabdomen 

that measures 4.9 cm in transverse diameter.  Evaluation for differential air-

fluid levels is limited due to the absence of intravenous contrast.


2.  Staghorn calculus in the right kidney.


3.  Postoperative findings as detailed above.


 














Assessment & Plan





- Diagnosis


(1) Rectal cancer


Is this a current diagnosis for this admission?: Yes   





(2) Obesity (BMI 30-39.9)


Is this a current diagnosis for this admission?: Yes   





(3) COPD (chronic obstructive pulmonary disease)


Qualifiers: 


   COPD type: unspecified COPD   Qualified Code(s): J44.9 - Chronic obstructive 

pulmonary disease, unspecified   


Is this a current diagnosis for this admission?: Yes   





- Time


Time Spent with patient: Less than 15 minutes





- Plan Summary


Plan Summary: 





This is a 64-year-old female status post robot-assisted laparoscopic low 

anterior resection for rectal cancer.  The patient is doing well, she is resting

comfortably.  She denies shortness of breath.  She complains of pain, however 

her pain medication regimen is effective.  She has had serosanguineous output 

from her TD drain.  Her right lower quadrant ileostomy is pink centrally and 

productive. She is sitting up in a chair today, and appears more comfortable.  

The patient is voiding without difficulty. Continue with ambulation. 





The patient complained of nausea and vomiting last night, however she has had no

nausea or vomiting today.  Her ileostomy continues to be productive.  In 

discussion with the nursing staff, they believe her nausea and vomiting was 

associated with an anxiety attack.  I will continue to monitor this closely.





Aggressive pulmonary toilet. 





Her resting and ambulatory O2 sat was checked by the nursing staff, and she does

not qualify for home O2 at this time.





The nursing staff has expressed concerns about the patient's ability to care for

herself at home.  I will ask  to perform an evaluation regarding 

SNF placement.  Currently, the patient is refusing to go to rehab or SNF.  I 

will continue to encourage her to reconsider.  If she continues to refuse 

rehab/SNF, will send her home with home health.

## 2020-01-17 NOTE — RADIOLOGY REPORT (SQ)
EXAM DESCRIPTION:  KUB/ABDOMEN (SINGLE VIEW)



COMPLETED DATE/TIME:  1/17/2020 8:57 am



REASON FOR STUDY:  abdominal pain, nausea, vomiting C20  MALIGNANT NEOPLASM OF RECTUM



COMPARISON:  None.



NUMBER OF VIEWS:  One view.



TECHNIQUE:   Supine radiographic image of the abdomen acquired.



LIMITATIONS:  None.



FINDINGS:  BOWEL GAS PATTERN: There is an air-filled dilated loop of small bowel in the left hemiabdo
men that measures 4.9 cm in transverse diameter.  Evaluation for differential air-fluid levels is carmen
ited due to the absence of intravenous contrast.

CALCIFICATIONS: Staghorn calculus projecting within the right renal fossa.

SOFT TISSUES: There is no pneumatosis.

HARDWARE: Percutaneous drain in the pelvis and surgical staples that project over the L5 vertebral jung
dy, over the mid pelvis, and over the pubic symphysis.

BONES: No acute fracture.

OTHER: No other finding.



IMPRESSION:  1. Air-filled dilated loop of small bowel in the left hemiabdomen that measures 4.9 cm i
n transverse diameter.  Evaluation for differential air-fluid levels is limited due to the absence of
 intravenous contrast.

2.  Staghorn calculus in the right kidney.

3.  Postoperative findings as detailed above.



TECHNICAL DOCUMENTATION:  JOB ID:  [id number]

 2011 [employer]- All Rights Reserved



Reading location - IP/workstation name: [email or web address]

## 2020-01-18 RX ADMIN — ATENOLOL SCH MG: 50 TABLET ORAL at 10:21

## 2020-01-18 RX ADMIN — ASPIRIN SCH MG: 81 TABLET, COATED ORAL at 10:21

## 2020-01-18 RX ADMIN — LEVOTHYROXINE SODIUM SCH MG: 100 TABLET ORAL at 07:28

## 2020-01-18 RX ADMIN — ATORVASTATIN CALCIUM SCH MG: 10 TABLET, FILM COATED ORAL at 21:16

## 2020-01-18 RX ADMIN — OXYCODONE HYDROCHLORIDE PRN MG: 5 TABLET ORAL at 15:16

## 2020-01-18 RX ADMIN — PANTOPRAZOLE SODIUM SCH MG: 20 TABLET, DELAYED RELEASE ORAL at 10:19

## 2020-01-18 RX ADMIN — ALPRAZOLAM PRN MG: 0.5 TABLET ORAL at 21:16

## 2020-01-18 RX ADMIN — ALPRAZOLAM PRN MG: 0.5 TABLET ORAL at 05:08

## 2020-01-18 RX ADMIN — ENOXAPARIN SODIUM SCH MG: 40 INJECTION SUBCUTANEOUS at 10:22

## 2020-01-18 RX ADMIN — OXYCODONE HYDROCHLORIDE PRN MG: 5 TABLET ORAL at 10:21

## 2020-01-18 RX ADMIN — BUSPIRONE HYDROCHLORIDE SCH MG: 10 TABLET ORAL at 05:08

## 2020-01-18 RX ADMIN — DEXAMETHASONE SODIUM PHOSPHATE PRN MG: 10 INJECTION INTRAMUSCULAR; INTRAVENOUS at 07:53

## 2020-01-18 RX ADMIN — BUSPIRONE HYDROCHLORIDE SCH MG: 10 TABLET ORAL at 21:16

## 2020-01-18 RX ADMIN — OXYCODONE HYDROCHLORIDE PRN MG: 5 TABLET ORAL at 21:15

## 2020-01-18 RX ADMIN — BUSPIRONE HYDROCHLORIDE SCH MG: 10 TABLET ORAL at 15:16

## 2020-01-18 RX ADMIN — SERTRALINE HYDROCHLORIDE SCH MG: 50 TABLET ORAL at 10:21

## 2020-01-18 NOTE — PDOC PROGRESS REPORT
Subjective


Progress Note for:: 01/18/20


Subjective:: 





feels well


ellen diet


sitting up in chair


Reason For Visit: 


MALIGNANT NEOPLASM OF RECTUM








Physical Exam


Vital Signs: 


                                        











Temp Pulse Resp BP Pulse Ox


 


 98.2 F   70   16   117/70   91 L


 


 01/17/20 23:17  01/17/20 23:17  01/17/20 23:17  01/17/20 23:17  01/17/20 23:17








                                 Intake & Output











 01/17/20 01/18/20 01/19/20





 06:59 06:59 06:59


 


Intake Total 576 610 


 


Output Total 1105 1355 20


 


Balance -529 -745 -20


 


Weight 92.4 kg 92.4 kg 











General appearance: PRESENT: no acute distress


Head exam: PRESENT: normocephalic


Eye exam: PRESENT: EOMI


Ear exam: PRESENT: normal external ear exam


Mouth exam: PRESENT: moist


Respiratory exam: PRESENT: clear to auscultation susan


Cardiovascular exam: PRESENT: RRR


Pulses: PRESENT: normal radial pulses, normal femoral pulses


Vascular exam: PRESENT: normal capillary refill


GI/Abdominal exam: PRESENT: soft - TD left side draining serous fluid 

approximately 20 cc overnight the ileostomy is functioning well on the bag


Rectal exam: PRESENT: deferred


Extremities exam: PRESENT: full ROM


Musculoskeletal exam: PRESENT: ambulatory


Neurological exam: PRESENT: alert, awake, oriented to person, oriented to place


Psychiatric exam: PRESENT: appropriate affect


Skin exam: PRESENT: dry





Results


Laboratory Results: 


                                        





                                 01/15/20 04:46 





                                 01/17/20 07:55 





                                        











  01/17/20





  07:55


 


Sodium  139.5


 


Potassium  4.2


 


Chloride  100


 


Carbon Dioxide  28


 


Anion Gap  12


 


BUN  14


 


Creatinine  0.69


 


Est GFR (African Amer)  > 60


 


Glucose  112 H


 


Calcium  9.9


 


Total Bilirubin  0.4


 


AST  46 H


 


Alkaline Phosphatase  179 H


 


Total Protein  6.7


 


Albumin  3.5











Impressions: 


                                        





KUB X-Ray  01/17/20 07:00


IMPRESSION:  1. Air-filled dilated loop of small bowel in the left hemiabdomen 

that measures 4.9 cm in transverse diameter.  Evaluation for differential air-

fluid levels is limited due to the absence of intravenous contrast.


2.  Staghorn calculus in the right kidney.


3.  Postoperative findings as detailed above.


 














Assessment & Plan





- Time


Time Spent with patient: 25-34 minutes





- Plan Summary


Plan Summary: 





Patient is status post a low anterior resection for rectal cancer patient doing 

well postop





Tolerating a regular diet now and having normal bowel function.





Patient does have some difficulty caring for her ileostomy in we will be 

arranging home health upon discharge in the next day or 2.

## 2020-01-18 NOTE — PDOC PROGRESS REPORT
Subjective


Progress Note for:: 01/18/20


Subjective:: 





Patient comfortable, reports poor appetite


Reason For Visit: 


MALIGNANT NEOPLASM OF RECTUM








Physical Exam


Vital Signs: 


                                        











Temp Pulse Resp BP Pulse Ox


 


 97.9 F   71   20   109/70   91 L


 


 01/18/20 07:50  01/18/20 07:50  01/18/20 07:50  01/18/20 07:50  01/18/20 07:50








                                 Intake & Output











 01/17/20 01/18/20 01/19/20





 06:59 06:59 06:59


 


Intake Total 576 610 


 


Output Total 1105 1355 20


 


Balance -529 -745 -20


 


Weight 92.4 kg 92.4 kg 











General appearance: PRESENT: no acute distress


Respiratory exam: PRESENT: clear to auscultation susan


Cardiovascular exam: PRESENT: RRR


GI/Abdominal exam: PRESENT: normal bowel sounds, soft, other - Ileostomy = 

viable, covered with fibrinous material, bag filled with gas and semi-liquid 

stools Incisions = midline incision healing, partially open with staples 

granulating, fascia intact, all other incisions are clean, dry, and intact 

Intra-abdominal Larry drain placed through left lower quadrant, filled with 

serosanguineous fluid





Results


Laboratory Results: 


                                        





                                 01/15/20 04:46 





                                 01/17/20 07:55 








Impressions: 


                                        





KUB X-Ray  01/17/20 07:00


IMPRESSION:  1. Air-filled dilated loop of small bowel in the left hemiabdomen 

that measures 4.9 cm in transverse diameter.  Evaluation for differential air-

fluid levels is limited due to the absence of intravenous contrast.


2.  Staghorn calculus in the right kidney.


3.  Postoperative findings as detailed above.


 














Assessment & Plan





- Time


Time Spent with patient: 25-34 minutes





- Plan Summary


Plan Summary: 





Assessment:





30-day #8 following robotic low anterior resection for rectal cancer


Pathology significant for rectal cancer T3 N1 M0


Patient doing overall well, despite poor appetite


Physical exam unremarkable


Ileostomy viable, covered with fibrinous material, with good stool output


Good urine output


Larry drain output about 20 mL overnight, serosanguineous








Plan:





Encourage p.o. intake


Waiting for discharge to SNF next week


No acute general surgery issues identified


Removal of the TD drain will be as per Dr. Echols

## 2020-01-19 RX ADMIN — ALPRAZOLAM PRN MG: 0.5 TABLET ORAL at 22:28

## 2020-01-19 RX ADMIN — BUSPIRONE HYDROCHLORIDE SCH MG: 10 TABLET ORAL at 05:24

## 2020-01-19 RX ADMIN — OXYCODONE HYDROCHLORIDE PRN MG: 5 TABLET ORAL at 10:22

## 2020-01-19 RX ADMIN — BUSPIRONE HYDROCHLORIDE SCH MG: 10 TABLET ORAL at 13:20

## 2020-01-19 RX ADMIN — SERTRALINE HYDROCHLORIDE SCH MG: 50 TABLET ORAL at 09:30

## 2020-01-19 RX ADMIN — OXYCODONE HYDROCHLORIDE PRN MG: 5 TABLET ORAL at 17:31

## 2020-01-19 RX ADMIN — ENOXAPARIN SODIUM SCH MG: 40 INJECTION SUBCUTANEOUS at 09:31

## 2020-01-19 RX ADMIN — ASPIRIN SCH MG: 81 TABLET, COATED ORAL at 09:30

## 2020-01-19 RX ADMIN — OXYCODONE HYDROCHLORIDE PRN MG: 5 TABLET ORAL at 22:28

## 2020-01-19 RX ADMIN — BUSPIRONE HYDROCHLORIDE SCH MG: 10 TABLET ORAL at 22:26

## 2020-01-19 RX ADMIN — LEVOTHYROXINE SODIUM SCH MG: 100 TABLET ORAL at 09:30

## 2020-01-19 RX ADMIN — PANTOPRAZOLE SODIUM SCH MG: 20 TABLET, DELAYED RELEASE ORAL at 09:30

## 2020-01-19 RX ADMIN — ALPRAZOLAM PRN MG: 0.5 TABLET ORAL at 05:24

## 2020-01-19 RX ADMIN — ATENOLOL SCH MG: 50 TABLET ORAL at 09:29

## 2020-01-19 RX ADMIN — ATORVASTATIN CALCIUM SCH MG: 10 TABLET, FILM COATED ORAL at 22:27

## 2020-01-19 NOTE — PDOC PROGRESS REPORT
Subjective


Progress Note for:: 01/19/20


Subjective:: 


The patient appears to be comfortable, she is eating her meal, she wants to go 

home





Reason For Visit: 


MALIGNANT NEOPLASM OF RECTUM








Physical Exam


Vital Signs: 


                                        











Temp Pulse Resp BP Pulse Ox


 


 98.0 F   73   16   124/76   94 


 


 01/18/20 23:20  01/18/20 23:20  01/18/20 23:20  01/18/20 23:20  01/18/20 23:20








                                 Intake & Output











 01/18/20 01/19/20 01/20/20





 06:59 06:59 06:59


 


Intake Total 610 240 


 


Output Total 1355 970 


 


Balance -745 -730 


 


Weight 92.4 kg  











General appearance: PRESENT: no acute distress, obese


Respiratory exam: PRESENT: clear to auscultation susan


Cardiovascular exam: PRESENT: RRR


GI/Abdominal exam: PRESENT: soft, other - Midline incision granulating; 

Ileostomy viable with stools and gas in the ileostomy bag;left side intra-

abdominal drain with serousanguinous fluid





Results


Laboratory Results: 


                                        





                                 01/15/20 04:46 





                                 01/17/20 07:55 








Impressions: 


                                        





KUB X-Ray  01/17/20 07:00


IMPRESSION:  1. Air-filled dilated loop of small bowel in the left hemiabdomen 

that measures 4.9 cm in transverse diameter.  Evaluation for differential air-

fluid levels is limited due to the absence of intravenous contrast.


2.  Staghorn calculus in the right kidney.


3.  Postoperative findings as detailed above.


 














Assessment & Plan





- Diagnosis


(1) Rectal cancer


Is this a current diagnosis for this admission?: Yes   





- Time


Time Spent with patient: 15-24 minutes





- Plan Summary


Plan Summary: 


Assessment








Postop-day #9 following robotic low anterior resection for rectal cancer with 

loop ileostomy


Pathology significant for rectal cancer T3 N1 M0


Patient doing overall well, improved appetite


Physical exam unremarkable


Ileostomy viable, covered with fibrinous material, with good stool output


Good urine output


Larry drain output about 20 mL overnight, serosanguineous








Plan:





Encourage p.o. intake


Discharge next week as per Dr. Echols


No acute general surgery issues identified


Removal of the TD drain as per Dr. Echols

## 2020-01-20 VITALS — SYSTOLIC BLOOD PRESSURE: 154 MMHG | DIASTOLIC BLOOD PRESSURE: 107 MMHG

## 2020-01-20 RX ADMIN — ATENOLOL SCH MG: 50 TABLET ORAL at 10:11

## 2020-01-20 RX ADMIN — ASPIRIN SCH MG: 81 TABLET, COATED ORAL at 10:11

## 2020-01-20 RX ADMIN — OXYCODONE HYDROCHLORIDE PRN MG: 5 TABLET ORAL at 13:53

## 2020-01-20 RX ADMIN — BUSPIRONE HYDROCHLORIDE SCH MG: 10 TABLET ORAL at 05:26

## 2020-01-20 RX ADMIN — LEVOTHYROXINE SODIUM SCH MG: 100 TABLET ORAL at 10:10

## 2020-01-20 RX ADMIN — SERTRALINE HYDROCHLORIDE SCH MG: 50 TABLET ORAL at 10:08

## 2020-01-20 RX ADMIN — PANTOPRAZOLE SODIUM SCH MG: 20 TABLET, DELAYED RELEASE ORAL at 10:11

## 2020-01-20 RX ADMIN — BUSPIRONE HYDROCHLORIDE SCH MG: 10 TABLET ORAL at 13:40

## 2020-01-20 RX ADMIN — ENOXAPARIN SODIUM SCH MG: 40 INJECTION SUBCUTANEOUS at 10:12

## 2020-01-20 RX ADMIN — OXYCODONE HYDROCHLORIDE PRN MG: 5 TABLET ORAL at 05:26

## 2020-01-20 NOTE — PDOC DISCHARGE SUMMARY
General





- Admit/Disc Date/PCP


Admission Date/Primary Care Provider: 


  01/10/20 08:26





  KRISTINA CADENA-REECE





Discharge Date: 01/20/20





- Discharge Diagnosis


Final Diagnosis: 





rectal cancer





- Assessment


Summary: 


This is a 64-year-old female with locally advanced rectal cancer.  The patient 

underwent preoperative (neoadjuvant) chemoradiation.  The patient finished her 

treatment cycle, and was referred for surgery.  Patient underwent an operation 

(robot-assisted laparoscopic low anterior resection) last week.  After surgery, 

the patient was taken to the floor in stable condition.  The patient began 

tolerating a diet, and remained very stable throughout her course.  The patient 

was difficult to mobilize, as her exercise tolerance is very poor.  The patient 

also was not terribly motivated to care for her ileostomy.  With much coaxing, 

the patient has begun to care for herself.  She is changing her ileostomy.  A 

recommendation was made for SNF placement, however the patient refused.  In 

light of this, home health has been arranged.  The patient has a support 

structure at home through family and friends.  At this time, it is felt that the

patient has reached maximal hospital benefit and is fit for discharge.





- Additional Information


Resuscitation Status: Full Code


Discharge Diet: As Tolerated


Discharge Activity: No Lifting Over 10 Pounds, No Lifting/Push/Pulling


Referrals: 


[facility] [Provider Group] - 01/28/20 2:15 pm


Prescriptions: 


Hydrocodone Bit/Acetaminophen [Hydrocodon-Acetaminophn ] 1 each PO Q6HP 

PRN 7 Days #28 tablet


 PRN Reason: For Pain


Home Medications: 








Amlodipine Besylate [Norvasc 5 mg Tablet] 25 mg PO QPM #0 03/09/12 


Aspirin [Adult Low Dose Aspirin EC] 81 mg PO DAILY #0 03/09/12 


Levothyroxine Sodium 125 mcg PO QAM 03/09/12 


Oxybutynin Chloride [Ditropan 5 mg Tablet] 5 mg PO TID #0 03/09/12 


Ramipril [Altace 5 mg Capsule] 5 mg PO DAILY #0 03/09/12 


Atenolol [Tenormin] 25 mg PO DAILY 12/28/16 


Pravastatin Sodium [Pravachol] 20 mg PO QHS 12/28/16 


Albuterol Sulfate [Ventolin 0.042% Neb 1.25 mg/3 mL Ampul] 1 vial NEB QIDP PRN 

07/09/18 


Buspirone HCl [Buspar 15 mg Tablet] 1 tab PO TID 07/09/18 


Omeprazole 20 mg PO DAILY 07/09/18 


Albuterol Sulfate [Proair HFA Inhalation Aerosol 8.5 gm MDI] 2 puff IH Q4 PRN 

07/11/18 


Diphenhydramine HCl [Benadryl] 25 mg PO TID 09/20/19 


Sertraline HCl [Zoloft] 150 mg PO DAILY 09/20/19 


Alprazolam 1 mg PO TIDP PRN 01/11/20 


Hydroxyzine HCl [Atarax 25 mg Tablet] 1 tab PO Q8HP PRN 01/11/20 


Hydrocodone Bit/Acetaminophen [Hydrocodon-Acetaminophn ] 1 each PO Q6HP 

PRN 7 Days #28 tablet 01/20/20 








Additional Information: 





Discharge home with home health.  Diet as tolerated.  Activity: No lifting 

greater than 10 pounds x 6 weeks.  Follow-up with me in 7 to 10 days at [place] 

[facility].  Norco 10/325 mg p.o. every 6 hours PRN for pain.  Okay to 

shower.  No tub baths or swimming pools x2 weeks.  Dry dressing to lower midline

incision daily.





History of Present Illiness


History of Present Illness: 


SERGEI BAKER is a 64 year old female








Physical Exam


Vital Signs: 


                                        











Temp Pulse Resp BP Pulse Ox


 


 98.1 F   72   18   145/86 H  91 L


 


 01/20/20 13:10  01/20/20 13:10  01/20/20 13:10  01/20/20 13:10  01/20/20 13:10








                                 Intake & Output











 01/19/20 01/20/20 01/21/20





 06:59 06:59 06:59


 


Intake Total 240 210 60


 


Output Total 970 345 340


 


Balance -730 -135 -280


 


Weight  92.4 kg 














Results


Laboratory Results: 


                                        











WBC  6.4 10^3/uL (4.0-10.5)   01/15/20  04:46    


 


RBC  3.22 10^6/uL (3.72-5.28)  L  01/15/20  04:46    


 


Hgb  10.2 g/dL (12.0-15.5)  L  01/15/20  04:46    


 


Hct  30.1 % (36.0-47.0)  L  01/15/20  04:46    


 


MCV  93 fl (80-97)   01/15/20  04:46    


 


MCH  31.6 pg (27.0-33.4)   01/15/20  04:46    


 


MCHC  33.9 g/dL (32.0-36.0)   01/15/20  04:46    


 


RDW  22.4 % (11.5-14.0)  H  01/15/20  04:46    


 


Plt Count  237 10^3/uL (150-450)   01/15/20  04:46    


 


Lymph % (Auto)  8.3 % (13-45)  L  01/15/20  04:46    


 


Mono % (Auto)  13.6 % (3-13)  H  01/15/20  04:46    


 


Eos % (Auto)  1.6 % (0-6)   01/15/20  04:46    


 


Baso % (Auto)  0.3 % (0-2)   01/15/20  04:46    


 


Absolute Neuts (auto)  4.9 10^3/uL (1.7-8.2)   01/15/20  04:46    


 


Absolute Lymphs (auto)  0.5 10^3/uL (0.5-4.7)   01/15/20  04:46    


 


Absolute Monos (auto)  0.9 10^3/uL (0.1-1.4)   01/15/20  04:46    


 


Absolute Eos (auto)  0.1 10^3/uL (0.0-0.6)   01/15/20  04:46    


 


Absolute Basos (auto)  0.0 10^3/uL (0.0-0.2)   01/15/20  04:46    


 


Total Counted  100   01/11/20  04:23    


 


Seg Neutrophils %  76.2 % (42-78)   01/15/20  04:46    


 


Seg Neuts % (Manual)  95 % (42-78)  H  01/11/20  04:23    


 


Band Neutrophils %  1 % (3-5)  L  01/11/20  04:23    


 


Lymphocytes % (Manual)  1 % (13-45)  L  01/11/20  04:23    


 


Monocytes % (Manual)  3 % (3-13)   01/11/20  04:23    


 


Eosinophils % (Manual)  0 % (0-6)   01/11/20  04:23    


 


Basophils % (Manual)  0 % (0-2)   01/11/20  04:23    


 


Abs Neuts (Manual)  13.1 10^3/uL (1.7-8.2)  H  01/11/20  04:23    


 


Abs Lymphs (Manual)  0.1 10^3/uL (0.5-4.7)  L  01/11/20  04:23    


 


Abs Monocytes (Manual)  0.4 10^3/uL (0.1-1.4)   01/11/20  04:23    


 


Absolute Eos (Manual)  0.0 10^3/uL (0.0-0.6)   01/11/20  04:23    


 


Abs Basophils (Manual)  0.0 10^3/uL (0.0-0.2)   01/11/20  04:23    


 


Toxic Granulation  SLIGHT   01/15/20  04:46    


 


Toxic Vacuolation  PRESENT   01/11/20  04:23    


 


Platelet Comment  ADEQUATE   01/15/20  04:46    


 


Polychromasia  SLIGHT   01/15/20  04:46    


 


Poikilocytosis  SLIGHT   01/15/20  04:46    


 


Anisocytosis  3+   01/15/20  04:46    


 


Tear Drop Cells  SLIGHT   01/15/20  04:46    


 


Ovalocytes  SLIGHT   01/15/20  04:46    


 


Schistocytes  SLIGHT   01/12/20  05:02    


 


Carbonic Acid  1.48 mmol/L (1.05-1.35)  H  01/10/20  13:16    


 


HCO3/H2CO3 Ratio  16:1   01/10/20  13:16    


 


ABG pH  7.31  (7.35-7.45)  L  01/10/20  13:16    


 


ABG pCO2  49.1 mmHg (35-45)  H  01/10/20  13:16    


 


ABG pO2  57.5 mmHg ()  L  01/10/20  13:16    


 


ABG HCO3  24.3 mmol/L (20-24)  H  01/10/20  13:16    


 


ABG Total CO2  25.8 mmol/L (21-25)  H  01/10/20  13:16    


 


ABG O2 Saturation  87.2 % (94-98)  L  01/10/20  13:16    


 


ABG Base Excess  -2.2 mmol/L  01/10/20  13:16    


 


FiO2  100%   01/10/20  13:16    


 


Sodium  139.5 mmol/L (137-145)   01/17/20  07:55    


 


Potassium  4.2 mmol/L (3.6-5.0)   01/17/20  07:55    


 


Chloride  100 mmol/L ()   01/17/20  07:55    


 


Carbon Dioxide  28 mmol/L (22-30)   01/17/20  07:55    


 


Anion Gap  12  (5-19)   01/17/20  07:55    


 


BUN  14 mg/dL (7-20)   01/17/20  07:55    


 


Creatinine  0.69 mg/dL (0.52-1.25)   01/17/20  07:55    


 


Est GFR ( Amer)  > 60  (>60)   01/17/20  07:55    


 


Est GFR (MDRD) Non-Af  > 60  (>60)   01/17/20  07:55    


 


Glucose  112 mg/dL ()  H  01/17/20  07:55    


 


POC Glucose  106 mg/dL ()   01/10/20  10:08    


 


Hemoglobin A1c %  5.7 % (4.7-6.0)   01/10/20  08:59    


 


Calcium  9.9 mg/dL (8.4-10.2)   01/17/20  07:55    


 


Total Bilirubin  0.4 mg/dL (0.2-1.3)   01/17/20  07:55    


 


Direct Bilirubin  0.4 mg/dL (0.0-0.4)   01/17/20  07:55    


 


Neonat Total Bilirubin  Not Reportable   01/17/20  07:55    


 


Neonat Direct Bilirubin  Not Reportable   01/17/20  07:55    


 


Neonat Indirect Bili  Not Reportable   01/17/20  07:55    


 


AST  46 U/L (14-36)  H  01/17/20  07:55    


 


ALT  40 U/L (<35)   01/17/20  07:55    


 


Alkaline Phosphatase  179 U/L ()  H  01/17/20  07:55    


 


Total Protein  6.7 g/dL (6.3-8.2)   01/17/20  07:55    


 


Albumin  3.5 g/dL (3.5-5.0)   01/17/20  07:55    


 


Blood Type  B POSITIVE   01/10/20  08:59    


 


Antibody Screen  NEGATIVE   01/10/20  08:59    











Impressions: 


                                        





Chest X-Ray  01/03/20 10:41


IMPRESSION:  1.  New patchy left basilar opacities, possibly atelectasis or 

infection.


2.  Hiatal hernia.


 








Chest X-Ray  01/10/20 00:00


IMPRESSION:  No acute cardiopulmonary findings.  Hiatal hernia.


 








Chest X-Ray  01/10/20 16:18


IMPRESSION:  Hiatal hernia.  No acute cardiopulmonary finding.


 








KUB X-Ray  01/17/20 07:00


IMPRESSION:  1. Air-filled dilated loop of small bowel in the left hemiabdomen 

that measures 4.9 cm in transverse diameter.  Evaluation for differential air-

fluid levels is limited due to the absence of intravenous contrast.


2.  Staghorn calculus in the right kidney.


3.  Postoperative findings as detailed above.

## 2020-01-20 NOTE — OPERATIVE REPORT
Nonrecallable Operative Report


DATE OF SURGERY: 01/10/20


PREOPERATIVE DIAGNOSIS: Rectal cancer


POSTOPERATIVE DIAGNOSIS: Rectal cancer


OPERATION: 1.  Robot-assisted low anterior resection with diverting ileostomy.  

2.  Flexible sigmoidoscopy.


SURGEON: REMA DUONG


ANESTHESIA: GA


TISSUE REMOVED OR ALTERED: Low anterior resection, total mesorectal excision.


COMPLICATIONS: 





None apparent


ESTIMATED BLOOD LOSS: 100 cc


PROCEDURE: 





Drains/implants: 15 Bulgarian round Larry drain in the pelvis.





Procedure in detail: After informed consent was obtained, the patient was 

brought to the operating room and laid in the supine position.  The area of the 

abdomen was prepped and draped in a normal sterile fashion.  A supraumbilical 

incision was created with a 15 blade scalpel.  Dissection was carried down 

through the subcutaneous tissue using sharp and blunt dissection.  The cicatrix 

was identified, grasped with a Kocher clamp, and retracted upwards.  The linea 

alba fascia was incised sharply, the abdomen was entered sharply.  The balloon 

trocar was inserted, and pneumoperitoneum was achieved.





A right lower quadrant 10 mm robotic trocar was placed, as well as to the left 

lower quadrant 8 mm robotic trochars.  The robot was then brought over the 

patient and docked appropriately.  I then assumed my position at the surgeon's 

console.  Attention was turned to the retraction of the small bowel.  A fan 

retractor was required, therefore a right upper quadrant 5 mm trocar was placed 

to facilitate the retraction of the small bowel.  Once the small bowel was 

retracted above the pelvis, dissection was begun at the sacral promontory.  The 

mesentery was scored, and the mesorectum was retracted anteriorly.  Dissection 

was carried through the mesentery, to find the presacral fascia.  A total 

mesorectal excision was undertaken using sharp dissection and electrocautery.  

Dissection was carried posteriorly first, paying attention to the location of 

bilateral ureters.  The ureters were identified and spared along their course.  

No injury was identified to the ureters.  The dissection was carried posteriorly

down to the level of the levators.  Next, the anterior dissection was performed.

 The peritoneal reflection was divided anteriorly using electrocautery.  The 

uterus and vagina were retracted anteriorly, and  from the rectum.  

Next, the lateral stalks were cauterized and divided using bipolar 

electrocautery.  Once this was performed, the total mesorectal excision was 

completed.  Attempt was made to divide the lower rectum with the robotic 

stapler, however the tissue was too thick for the stapler.  A contour stapler 

would be required.





The robot was then undocked from the patient, and moved away.  I scrubbed back 

into the case.  A lower midline incision was created with a 15 blade scalpel.  

Dissection was carried through the subcutaneous tissues using sharp dissection 

and electrocautery.  The midline fascia was divided, the abdomen was entered 

sharply.  Next the Nagi wound retractor was placed into the wound and 

tightened.  The rectum was then identified.  It was retracted into the wound.  

The contour stapler was then placed into the pelvis and used to divide the 

distal rectum.  The contour stapler was again used to divide the proximal rectum

at the rectosigmoid junction.  The specimen was then examined on the back table.

 The distal rectum was opened, to ensure at least a 2.5 cm margin.  The tumor 

was easily identified, with the distal tattoo.  There was approximately 1 cm of 

distal margin, grossly.  I was concerned that this would be inadequate to treat 

her malignancy.  Secondary to this an additional margin was taken.  The distal 

rectum was grasped with a large Cecil clamp and retracted proximally.  The 

contour stapler was used to divide and staple the distal rectum again.  An 

additional 2 cm of distal rectum was removed.  The distal aspect of the 

resection was marked with a Prolene suture.





Next, attention was turned to creation of the colorectal anastomosis.  A 29 EEA 

sizer was used to adequately size the rectum.  The EEA sizer fit very easily 

into the rectum.  The 29 EEA stapler was then opened.  The anvil was placed into

the distal descending colon, after the descending colon was opened.  Next, the 

anvil was sewn into place using a 2-0 Vicryl pursestring suture.  Once this was 

completed, the EEA stapler was inserted into the rectum, the stapler was opened,

and the anvil was  to the stapler.  The stapler was closed and fired 

according to  recommendation.  The stapler was removed from the 

rectum.  It was opened on the back table, and 2 complete donuts of tissue were 

removed.  These were sent to pathology as well.  Next the anastomosis was 

tested, and found to be free of any leakage of air.  This was done with a 

flexible sigmoidoscope.  Once this was completed, attention was turned to 

creation of the diverting ileostomy.  A loop of terminal ileum was brought up 

into the area where the 10 mm right lower quadrant trocar was situated.  The 

fascia was opened in a cruciate fashion, and the loop of terminal ileum was 

easily brought through the fascia.  Next, the abdomen was copiously irrigated.  

A 15 Bulgarian round Larry drain was placed into the pelvis.





Next, the midline abdominal fascia was reapproximated using #1 PDS suture in 

simple running fashion.  The overlying skin was closed using skin staples.  Once

the abdomen was closed, and the dressing was placed, attention was turned to 

Brooking of the ileostomy.  An incision was remade in the small bowel.  The wall

of the small bowel was reflected over, sutured to itself, and sutured to the 

skin.  This matured the ileostomy in Roseline fashion.  A red rubber catheter was 

sutured to the skin, and used as a bolster.  Once this was completed, an 

ileostomy appliance was fashioned, and the procedure was concluded.  All sponge,

instrument, and needle counts were correct x2.





Condition: Stable.

## 2020-01-22 ENCOUNTER — HOSPITAL ENCOUNTER (EMERGENCY)
Dept: HOSPITAL 62 - ER | Age: 65
LOS: 1 days | Discharge: HOME HEALTH | End: 2020-01-23
Payer: MEDICAID

## 2020-01-22 DIAGNOSIS — Z43.3: Primary | ICD-10-CM

## 2020-01-22 DIAGNOSIS — I10: ICD-10-CM

## 2020-01-22 DIAGNOSIS — Z88.2: ICD-10-CM

## 2020-01-22 DIAGNOSIS — J44.9: ICD-10-CM

## 2020-01-22 DIAGNOSIS — Z75.1: ICD-10-CM

## 2020-01-22 DIAGNOSIS — R63.0: ICD-10-CM

## 2020-01-22 DIAGNOSIS — R10.84: ICD-10-CM

## 2020-01-22 DIAGNOSIS — R11.2: ICD-10-CM

## 2020-01-22 DIAGNOSIS — Z90.49: ICD-10-CM

## 2020-01-22 DIAGNOSIS — Z91.013: ICD-10-CM

## 2020-01-22 DIAGNOSIS — N20.0: ICD-10-CM

## 2020-01-22 DIAGNOSIS — Z91.040: ICD-10-CM

## 2020-01-22 DIAGNOSIS — N39.0: ICD-10-CM

## 2020-01-22 LAB
ABSOLUTE LYMPHOCYTES# (MANUAL): 0.8 10^3/UL (ref 0.5–4.7)
ABSOLUTE MONOCYTES # (MANUAL): 0.5 10^3/UL (ref 0.1–1.4)
ADD MANUAL DIFF: YES
ALBUMIN SERPL-MCNC: 3.9 G/DL (ref 3.5–5)
ALP SERPL-CCNC: 142 U/L (ref 38–126)
ANION GAP SERPL CALC-SCNC: 11 MMOL/L (ref 5–19)
ANISOCYTOSIS BLD QL SMEAR: (no result)
APPEARANCE UR: (no result)
APTT PPP: (no result) S
AST SERPL-CCNC: 20 U/L (ref 14–36)
BASOPHILS NFR BLD MANUAL: 0 % (ref 0–2)
BILIRUB DIRECT SERPL-MCNC: 0.1 MG/DL (ref 0–0.4)
BILIRUB SERPL-MCNC: 0.4 MG/DL (ref 0.2–1.3)
BILIRUB UR QL STRIP: NEGATIVE
BUN SERPL-MCNC: 18 MG/DL (ref 7–20)
CALCIUM: 9.4 MG/DL (ref 8.4–10.2)
CHLORIDE SERPL-SCNC: 99 MMOL/L (ref 98–107)
CO2 SERPL-SCNC: 28 MMOL/L (ref 22–30)
DACRYOCYTES BLD QL SMEAR: SLIGHT
EOSINOPHIL NFR BLD MANUAL: 0 % (ref 0–6)
ERYTHROCYTE [DISTWIDTH] IN BLOOD BY AUTOMATED COUNT: 19.8 % (ref 11.5–14)
GLUCOSE SERPL-MCNC: 108 MG/DL (ref 75–110)
GLUCOSE UR STRIP-MCNC: NEGATIVE MG/DL
HCT VFR BLD CALC: 34.9 % (ref 36–47)
HGB BLD-MCNC: 11.4 G/DL (ref 12–15.5)
KETONES UR STRIP-MCNC: (no result) MG/DL
MCH RBC QN AUTO: 30.3 PG (ref 27–33.4)
MCHC RBC AUTO-ENTMCNC: 32.6 G/DL (ref 32–36)
MCV RBC AUTO: 93 FL (ref 80–97)
MONOCYTES % (MANUAL): 3 % (ref 3–13)
NITRITE UR QL STRIP: POSITIVE
OVALOCYTES BLD QL SMEAR: SLIGHT
PH UR STRIP: 5 [PH] (ref 5–9)
PLATELET # BLD: 462 10^3/UL (ref 150–450)
PLATELET COMMENT: (no result)
POTASSIUM SERPL-SCNC: 3.6 MMOL/L (ref 3.6–5)
PROT SERPL-MCNC: 7.1 G/DL (ref 6.3–8.2)
PROT UR STRIP-MCNC: 100 MG/DL
RBC # BLD AUTO: 3.75 10^6/UL (ref 3.72–5.28)
SEGMENTED NEUTROPHILS % (MAN): 92 % (ref 42–78)
SP GR UR STRIP: 1.02
TOTAL CELLS COUNTED BLD: 100
UROBILINOGEN UR-MCNC: NEGATIVE MG/DL (ref ?–2)
VARIANT LYMPHS NFR BLD MANUAL: 5 % (ref 13–45)
WBC # BLD AUTO: 15.5 10^3/UL (ref 4–10.5)

## 2020-01-22 PROCEDURE — 87186 SC STD MICRODIL/AGAR DIL: CPT

## 2020-01-22 PROCEDURE — 83690 ASSAY OF LIPASE: CPT

## 2020-01-22 PROCEDURE — 87088 URINE BACTERIA CULTURE: CPT

## 2020-01-22 PROCEDURE — 96361 HYDRATE IV INFUSION ADD-ON: CPT

## 2020-01-22 PROCEDURE — 93010 ELECTROCARDIOGRAM REPORT: CPT

## 2020-01-22 PROCEDURE — 96376 TX/PRO/DX INJ SAME DRUG ADON: CPT

## 2020-01-22 PROCEDURE — 94640 AIRWAY INHALATION TREATMENT: CPT

## 2020-01-22 PROCEDURE — 96375 TX/PRO/DX INJ NEW DRUG ADDON: CPT

## 2020-01-22 PROCEDURE — 81001 URINALYSIS AUTO W/SCOPE: CPT

## 2020-01-22 PROCEDURE — 87086 URINE CULTURE/COLONY COUNT: CPT

## 2020-01-22 PROCEDURE — 74176 CT ABD & PELVIS W/O CONTRAST: CPT

## 2020-01-22 PROCEDURE — 99284 EMERGENCY DEPT VISIT MOD MDM: CPT

## 2020-01-22 PROCEDURE — 36415 COLL VENOUS BLD VENIPUNCTURE: CPT

## 2020-01-22 PROCEDURE — 85025 COMPLETE CBC W/AUTO DIFF WBC: CPT

## 2020-01-22 PROCEDURE — 80053 COMPREHEN METABOLIC PANEL: CPT

## 2020-01-22 PROCEDURE — 93005 ELECTROCARDIOGRAM TRACING: CPT

## 2020-01-22 PROCEDURE — 96365 THER/PROPH/DIAG IV INF INIT: CPT

## 2020-01-22 NOTE — ER DOCUMENT REPORT
ED GI/





- General


Chief Complaint: Abdominal Pain


Stated Complaint: COLONASTOMY PROBLEMS


Time Seen by Provider: 20 02:36


Primary Care Provider: 


RASTA ARMANDO FNP-C [Primary Care Provider] - Follow up tomorrow


Notes: 





Patient is a 64-year-old female that comes emergency department for chief 

complaint of concerns about her colostomy bag.  She states that she was 

discharged 2 days ago after having colon resection for rectal cancer, she also 

completed preoperative chemotherapy before this.  She states she was discharged 

home and she is supposed to have home health coming later today.  She states 

that she stumbled, landed against her bed, accidentally ripped off her colostomy

bag at that time.  She states she tried to use her replacement but after she put

it on it was leaking and she became concerned and came in for evaluation.  She 

also states that she is having low appetite and has vomited after eating.  She 

states that she is scared she cannot care for herself properly at home.  She 

denies fever, abdominal pain, change in the wounds, or any other complaints at 

this time.  Past medical history includes hypertension, hypothyroidism, 

hyperlipidemia, GERD, anxiety.


TRAVEL OUTSIDE OF THE [place] IN LAST 30 DAYS: No





- Related Data


Allergies/Adverse Reactions: 


                                        





shellfish derived Allergy (Severe, Verified 20 02:17)


   Hives


iodine [Iodine] Allergy (Intermediate, Verified 20 02:17)


   Rash


Sulfa (Sulfonamide Antibiotics) Allergy (Intermediate, Verified 20 02:17)


   Hives


Iodinated Contrast Media Adverse Reaction (Intermediate, Verified 20 

02:17)


   Nausea











Past Medical History





- General


Information source: Patient





- Social History


Smoking Status: Former Smoker


Frequency of alcohol use: None


Drug Abuse: None


Lives with: Friend


Family History: CAD, DM, Hyperlipidemia, Hypertension


Patient has suicidal ideation: No


Patient has homicidal ideation: No





- Past Medical History


Cardiac Medical History: Reports: Hx Heart Attack - unsure-saw cardiologist 

 in [place],  Hypertension


   Denies: Hx Atrial Fibrillation, Hx Congestive Heart Failure, Hx Coronary 

Artery Disease, Hx Hypercholesterolemia, Hx Peripheral Vascular Disease, Hx 

Pulmonary Embolism, Hx Heart Murmur


Pulmonary Medical History: Reports: Hx Asthma, Hx Bronchitis, Hx COPD, Hx 

Pneumonia - 


   Denies: Hx Respiratory Failure, Hx Sleep Apnea, Hx Tuberculosis


Neurological Medical History: Denies: Hx Cerebrovascular Accident, Hx Seizures, 

Hx Parkinson's Disease


Endocrine Medical History: Reports: Hx Hyperthyroidism - liquid radiation.  

Denies: Hx Hypothyroidism


Malignancy Medical History: Denies: Hx Leukemia, Hx Lung Cancer


GI Medical History: Reports: Hx Gastroesophageal Reflux Disease.  Denies: Hx 

Crohn's Disease, Hx Hepatitis, Hx Hiatal Hernia, Hx Irritable Bowel, Hx Liver 

Failure, Hx Pancreatitis, Hx Ulcer


Musculoskeletal Medical History: Reports Hx Arthritis, Denies Hx Fibromyalgia, D

enies Hx Multiple Sclerosis, Denies Hx Muscular Dystrophy, Denies Hx Systemic 

Lupus Erythematosus


Psychiatric Medical History: Reports: Hx Depression


   Denies: Hx Bipolar Disorder, Hx Dementia, Hx Post Traumatic Stress Disorder, 

Hx Schizophrenia


Traumatic Medical History: Denies: Hx Fractures


Infectious Medical History: Denies: Hx Hepatitis, Hx HIV


Past Surgical History: Reports: Hx Tubal Ligation, Other - Tumor removal of the 

throat which was benign.  Denies: Hx Appendectomy, Hx Bowel Surgery, Hx 

 Section, Hx Cholecystectomy, Hx Colostomy, Hx Coronary Artery Bypass Graft, Hx 

Gastric Bypass Surgery, Hx Herniorrhaphy, Hx Hysterectomy, Hx Mastectomy, Hx 

Open Heart Surgery, Hx Pacemaker, Hx Tonsillectomy





- Immunizations


Hx Diphtheria, Pertussis, Tetanus Vaccination: Yes - not sure





Review of Systems





- Review of Systems


Constitutional: See HPI


EENT: No symptoms reported


Cardiovascular: No symptoms reported


Respiratory: No symptoms reported


Gastrointestinal: See HPI


Genitourinary: No symptoms reported


Female Genitourinary: No symptoms reported


Musculoskeletal: No symptoms reported


Skin: See HPI


Hematologic/Lymphatic: No symptoms reported


Neurological/Psychological: No symptoms reported





Physical Exam





- Vital signs


Vitals: 


                                        











Temp Pulse Resp BP Pulse Ox


 


 98.2 F   85   16   138/99 H  96 


 


 20 02:02  20 02:02  20 02:02  20 02:02  20 02:02














- Notes


Notes: 





GENERAL: Alert, interacts well. No acute distress.


HEAD: Normocephalic, atraumatic.


EYES: Pupils equal, round, and reactive to light. Extraocular movements intact.


ENT: Oral mucosa moist, tongue midline. Oropharynx unremarkable. Airway patent. 


NECK: Full range of motion. Supple. Trachea midline.


LUNGS: Clear to auscultation bilaterally, no wheezes, rales, or rhonchi. No 

respiratory distress.


HEART: Regular rate and rhythm. No murmur


ABDOMEN: There is a colostomy bag in place over the right mid to lower abdomen 

without surrounding erythema or tenderness.  There is a midline vertical wound 

with 3 staples in the middle of 2 open areas that appear to be healing.  Areas 

not erythematous or tender, there is no bleeding or discharge, this does not 

extend into the abdomen beyond the skin.  No notable tenderness of the abdomen, 

no guarding.


GENITOURINARY: Deferred


EXTREMITIES: Moves all 4 extremities spontaneously. No edema, normal radial and 

dorsalis pedis pulses bilaterally. No cyanosis.


BACK: no cervical, thoracic, lumbar midline tenderness. No saddle anesthesia, 

normal distal neurovascular exam. Moves all extremities in full range of motion.


NEUROLOGICAL: Alert and oriented x3. Normal speech. Cranial nerves II through 

XII grossly intact. 


PSYCH: Normal affect, normal mood.


SKIN: Warm, dry, normal turgor. No rashes or lesions noted.





Course





- Re-evaluation


Re-evalutation: 


Patient is smiling, talkative, well-appearing.  Her abdomen is actually 

generally nontender, she states the wounds are the same as prior, these were 

cleaned and dressed.  There is no sign of infection.  Patient has had a replaced

 colostomy bag placed before I saw the patient and this is secure without 

bleeding or any discomfort.  Vital signs unremarkable without fever, 

tachycardia, hypotension.





CBC does show leukocytosis of 15,000, nonspecific given patient's reported 

vomiting.  Chemistry nonspecific.  Urinalysis does indicate infection.  Patient 

does reportedly have a history of kidney stones, along with her vomiting and 

leukocytosis decision was made to proceed with CAT scan to rule out obstructive 

ureterolithiasis or concerning findings postoperatively.





CT showing nephrolithiasis, probably chronic hydronephrosis, no retained 

ureterolithiasis or infected passing stone.  Possible ileus based on the small 

bowel but patient has been eating and drinking at the bedside when I reevaluated

 her and has tolerated this without any difficulty.  Patient states he feels 

much better now.  I discussed admission to the hospital because of all of her 

factors versus going home, she states she would like to go home with her friend.

  Because her vital signs are unremarkable, she has no infected obstructing 

stone obviously, she has no obvious complication from recent surgery, and she is

 asymptomatic at this time patient will be discharged home with very strict re

turn precautions.  Patient states that she has home health coming today to check

 on her.  She states she will come back if she worsens in any way.





- Vital Signs


Vital signs: 


                                        











Temp Pulse Resp BP Pulse Ox


 


 98.4 F   82   16   140/80 H  98 


 


 20 06:35  20 06:35  20 06:35  20 06:35  20 06:35














- Laboratory


Result Diagrams: 


                                 20 03:39





                                 20 03:39


Laboratory results interpreted by me: 


                                        











  20





  03:39 03:39 06:29


 


WBC  15.5 H  


 


Hgb  11.4 L  


 


Hct  34.9 L  


 


RDW  19.8 H  


 


Plt Count  462 H  


 


Seg Neuts % (Manual)  92 H  


 


Lymphocytes % (Manual)  5 L  


 


Abs Neuts (Manual)  14.3 H  


 


Est GFR (MDRD) Non-Af   56 L 


 


Alkaline Phosphatase   142 H 


 


Urine Protein    100 H


 


Urine Ketones    TRACE H


 


Urine Blood    SMALL H


 


Urine Nitrite    POSITIVE H


 


Ur Leukocyte Esterase    MODERATE H














Discharge





- Discharge


Clinical Impression: 


 Colostomy care





Vomiting


Qualifiers:


 Vomiting type: unspecified Vomiting Intractability: non-intractable Nausea 

presence: with nausea Qualified Code(s): R11.2 - Nausea with vomiting, 

unspecified





Abdominal pain


Qualifiers:


 Abdominal location: generalized Qualified Code(s): R10.84 - Generalized 

abdominal pain





Urinary tract infection


Qualifiers:


 Urinary tract infection type: site unspecified Hematuria presence: without 

hematuria Qualified Code(s): N39.0 - Urinary tract infection, site not specified





Condition: Stable


Disposition: HOME, SELF-CARE


Additional Instructions: 


Take the antibiotics as prescribed for the urinary tract infection as we 

discussed.


Take Zofran if needed for nausea.


Please follow close with primary care as already scheduled.





Return if you worsen including uncontrolled vomiting, severe worsening pain, 

fevers, redness or discolored drainage from the wounds, or any other concerning 

or worsening symptoms.


Prescriptions: 


Cephalexin Monohydrate [Keflex 500 mg Capsule] 500 mg PO BID 7 Days #14 capsule


Ondansetron [Zofran Odt 4 mg Tablet] 1 - 2 tab PO Q4H PRN #15 tab.rapdis


 PRN Reason: For Nausea/Vomiting


Referrals: 


RASTA ARMANDO, FNP-C [Primary Care Provider] - Follow up tomorrow

## 2020-01-22 NOTE — RADIOLOGY REPORT (SQ)
CT ABDOMEN AND PELVIS WITHOUT INTRAVENOUS CONTRAST: 1/22/2020

6:13 AM CST



HISTORY: 64-year old with postoperative abdominal pain.



COMPARISON: PET/CT from 9/24/2019



TECHNIQUE: Axial contiguous images were obtained from the lung

bases to the proximal femurs without oral or intravenous contrast

administered. Sagittal and coronal reconstructions were also

obtained and reviewed. This exam was performed according to our

departmental dose-optimization program, which includes automated

exposure control, adjustment of the mA and/or KV according to the

patient's size and/or use of iterative reconstruction technique.



FINDINGS: There are airspace opacities at the right lung base,

most likely representing atelectasis or infection. There is a

moderate-sized hiatal hernia present. There is a central line

catheter tip projecting at the proximal right atrium. 



Evaluation of the solid organs is limited by the lack of

intravenous contrast. The visualized hepatic parenchyma is

unremarkable.



Cholelithiasis is seen.. 



The spleen, pancreas, and adrenals are normal in size and

contour. 

There are multiple punctate calcifications at the left kidney.

There is severe right hydronephrosis with cortical thinning.

There are calculi seen at the right renal pelvis extending into

the proximal right ureter measuring at least 3.3 cm.

Calcifications are also seen at the dependent portion of the

right renal pelvis. There are postsurgical changes seen at the

lower rectosigmoid colon region.



Bladder is minimally distended, but grossly appears unremarkable.

 The uterus is present. There is evidence of prior bilateral

tubal ligation.



The stomach is not well distended. 



There is moderate fluid distention of the visualized bowel

without evidence for gross transition point. There is a

right-sided loop ostomy noted.



There is no evidence of pneumoperitoneum. There is some fluid or

increased density seen anterior to the sacrum. This could be due

to posttreatment changes or infection. A mass is not fully

excluded. Surgical clips are seen along the midline abdomen.



The appendix appears unremarkable.



The aorta and IVC appear normal in size. 



No significantly enlarged lymph nodes are seen in the abdomen or

pelvis. 



Review of the bone show no evidence of any suspicious lytic or

blastic lesions. There is mild anterolisthesis of L5 over S1 by 3

to 4 mm.



IMPRESSION:

There is moderate fluid distention of the small bowel which may

reflect evidence of an ileus.



There is severe hydronephrosis on the right side which is likely

chronic with a large calculus noted at the right renal pelvis and

ureteropelvic junction. Calculi are also seen at the calyces.



There are punctate left renal calculi present.



There is increased density at the presacral region which could be

secondary to posttreatment changes, infection, or mass. This is

posterior to the anastomosis site. Interval follow-up is

recommended.

## 2020-01-23 VITALS — SYSTOLIC BLOOD PRESSURE: 105 MMHG | DIASTOLIC BLOOD PRESSURE: 63 MMHG

## 2020-01-23 NOTE — EKG REPORT
SEVERITY:- ABNORMAL ECG -

SINUS RHYTHM

ATRIAL PREMATURE COMPLEX

PROBABLE INFERIOR INFARCT, AGE INDETERMINATE

:

Confirmed by: Oleksandr Abel MD 23-Jan-2020 07:51:57

## 2020-02-27 ENCOUNTER — HOSPITAL ENCOUNTER (INPATIENT)
Dept: HOSPITAL 62 - ER | Age: 65
LOS: 6 days | Discharge: NURSING, REHAB OR LONG-TERM CARE | DRG: 872 | End: 2020-03-04
Attending: INTERNAL MEDICINE | Admitting: INTERNAL MEDICINE
Payer: MEDICAID

## 2020-02-27 DIAGNOSIS — A41.9: Primary | ICD-10-CM

## 2020-02-27 DIAGNOSIS — B96.1: ICD-10-CM

## 2020-02-27 DIAGNOSIS — Z79.899: ICD-10-CM

## 2020-02-27 DIAGNOSIS — K21.9: ICD-10-CM

## 2020-02-27 DIAGNOSIS — E83.42: ICD-10-CM

## 2020-02-27 DIAGNOSIS — R65.20: ICD-10-CM

## 2020-02-27 DIAGNOSIS — N39.0: ICD-10-CM

## 2020-02-27 DIAGNOSIS — E87.2: ICD-10-CM

## 2020-02-27 DIAGNOSIS — Z79.82: ICD-10-CM

## 2020-02-27 DIAGNOSIS — Z79.51: ICD-10-CM

## 2020-02-27 DIAGNOSIS — I25.2: ICD-10-CM

## 2020-02-27 DIAGNOSIS — N17.9: ICD-10-CM

## 2020-02-27 DIAGNOSIS — L03.311: ICD-10-CM

## 2020-02-27 DIAGNOSIS — Z93.2: ICD-10-CM

## 2020-02-27 DIAGNOSIS — C18.9: ICD-10-CM

## 2020-02-27 DIAGNOSIS — F41.1: ICD-10-CM

## 2020-02-27 LAB
ABSOLUTE LYMPHOCYTES# (MANUAL): 0.4 10^3/UL (ref 0.5–4.7)
ABSOLUTE MONOCYTES # (MANUAL): 1.1 10^3/UL (ref 0.1–1.4)
ADD MANUAL DIFF: YES
ALBUMIN SERPL-MCNC: 4.1 G/DL (ref 3.5–5)
ALP SERPL-CCNC: 285 U/L (ref 38–126)
ANION GAP SERPL CALC-SCNC: 25 MMOL/L (ref 5–19)
ANISOCYTOSIS BLD QL SMEAR: (no result)
APPEARANCE UR: (no result)
APTT PPP: YELLOW S
AST SERPL-CCNC: 28 U/L (ref 14–36)
BASOPHILS NFR BLD MANUAL: 0 % (ref 0–2)
BILIRUB DIRECT SERPL-MCNC: 0.4 MG/DL (ref 0–0.4)
BILIRUB SERPL-MCNC: 0.4 MG/DL (ref 0.2–1.3)
BILIRUB UR QL STRIP: NEGATIVE
BUN SERPL-MCNC: 69 MG/DL (ref 7–20)
CALCIUM: 9.2 MG/DL (ref 8.4–10.2)
CHLORIDE SERPL-SCNC: 97 MMOL/L (ref 98–107)
CO2 SERPL-SCNC: 14 MMOL/L (ref 22–30)
EOSINOPHIL NFR BLD MANUAL: 0 % (ref 0–6)
ERYTHROCYTE [DISTWIDTH] IN BLOOD BY AUTOMATED COUNT: 16.1 % (ref 11.5–14)
GLUCOSE SERPL-MCNC: 112 MG/DL (ref 75–110)
GLUCOSE UR STRIP-MCNC: NEGATIVE MG/DL
HCT VFR BLD CALC: 34.2 % (ref 36–47)
HGB BLD-MCNC: 11.7 G/DL (ref 12–15.5)
KETONES UR STRIP-MCNC: NEGATIVE MG/DL
MCH RBC QN AUTO: 30.9 PG (ref 27–33.4)
MCHC RBC AUTO-ENTMCNC: 34.1 G/DL (ref 32–36)
MCV RBC AUTO: 91 FL (ref 80–97)
METAMYELOCYTES % (MANUAL): 1 % (ref 0–1)
MONOCYTES % (MANUAL): 8 % (ref 3–13)
NEUTS BAND NFR BLD MANUAL: 3 % (ref 3–5)
NITRITE UR QL STRIP: NEGATIVE
PH UR STRIP: 5 [PH] (ref 5–9)
PLATELET # BLD: 412 10^3/UL (ref 150–450)
PLATELET CLUMP BLD QL SMEAR: PRESENT
PLATELET COMMENT: ADEQUATE
POTASSIUM SERPL-SCNC: 4.6 MMOL/L (ref 3.6–5)
PROT SERPL-MCNC: 8 G/DL (ref 6.3–8.2)
PROT UR STRIP-MCNC: 100 MG/DL
RBC # BLD AUTO: 3.77 10^6/UL (ref 3.72–5.28)
SEGMENTED NEUTROPHILS % (MAN): 85 % (ref 42–78)
SP GR UR STRIP: 1.01
TOTAL CELLS COUNTED BLD: 100
UROBILINOGEN UR-MCNC: NEGATIVE MG/DL (ref ?–2)
VARIANT LYMPHS NFR BLD MANUAL: 3 % (ref 13–45)
WBC # BLD AUTO: 13.6 10^3/UL (ref 4–10.5)

## 2020-02-27 PROCEDURE — 96375 TX/PRO/DX INJ NEW DRUG ADDON: CPT

## 2020-02-27 PROCEDURE — 83605 ASSAY OF LACTIC ACID: CPT

## 2020-02-27 PROCEDURE — 85025 COMPLETE CBC W/AUTO DIFF WBC: CPT

## 2020-02-27 PROCEDURE — 87040 BLOOD CULTURE FOR BACTERIA: CPT

## 2020-02-27 PROCEDURE — 81001 URINALYSIS AUTO W/SCOPE: CPT

## 2020-02-27 PROCEDURE — S0028 INJECTION, FAMOTIDINE, 20 MG: HCPCS

## 2020-02-27 PROCEDURE — 36415 COLL VENOUS BLD VENIPUNCTURE: CPT

## 2020-02-27 PROCEDURE — 93005 ELECTROCARDIOGRAM TRACING: CPT

## 2020-02-27 PROCEDURE — 85027 COMPLETE CBC AUTOMATED: CPT

## 2020-02-27 PROCEDURE — 87086 URINE CULTURE/COLONY COUNT: CPT

## 2020-02-27 PROCEDURE — 87088 URINE BACTERIA CULTURE: CPT

## 2020-02-27 PROCEDURE — 87186 SC STD MICRODIL/AGAR DIL: CPT

## 2020-02-27 PROCEDURE — 96365 THER/PROPH/DIAG IV INF INIT: CPT

## 2020-02-27 PROCEDURE — 99285 EMERGENCY DEPT VISIT HI MDM: CPT

## 2020-02-27 PROCEDURE — 82962 GLUCOSE BLOOD TEST: CPT

## 2020-02-27 PROCEDURE — 80053 COMPREHEN METABOLIC PANEL: CPT

## 2020-02-27 PROCEDURE — 83735 ASSAY OF MAGNESIUM: CPT

## 2020-02-27 PROCEDURE — 93010 ELECTROCARDIOGRAM REPORT: CPT

## 2020-02-27 PROCEDURE — 84100 ASSAY OF PHOSPHORUS: CPT

## 2020-02-27 PROCEDURE — 80048 BASIC METABOLIC PNL TOTAL CA: CPT

## 2020-02-27 PROCEDURE — 96361 HYDRATE IV INFUSION ADD-ON: CPT

## 2020-02-27 NOTE — ER DOCUMENT REPORT
ED General





- General


Chief Complaint: General Weakness


Stated Complaint: GENERALIZED WEAKNESS


Time Seen by Provider: 20 18:39


Primary Care Provider: 


JEFF CHRIS MD [Primary Care Provider] - Follow up as needed


TRAVEL OUTSIDE OF THE [place] IN LAST 30 DAYS: No





- HPI


Notes: 





Patient is a 64-year-old female presents the emergency department for evaluation

of generalized weakness.  The patient has a diagnosis of colon cancer.  She 

underwent resection with ostomy placement, chemotherapy and radiation.  She 

states that she has been getting progressively weaker, but it became very bad 

today.  She has had some severe nausea.  She is really not eating.  She states 

the only liquid she is taking in at all is Pepsi.  She states she is still 

urinating.  She denies any pain at this point.  She does states she has 

significant pain and skin irritation when changing her ostomy bags.  She states 

her ostomy output has been good.  Otherwise, she states she has been taking her 

medications as prescribed, except for today, because she "just did not feel like

it."





- Related Data


Allergies/Adverse Reactions: 


                                        





shellfish derived Allergy (Severe, Verified 20 02:17)


   Hives


iodine [Iodine] Allergy (Intermediate, Verified 20 02:17)


   Rash


Sulfa (Sulfonamide Antibiotics) Allergy (Intermediate, Verified 20 02:17)


   Hives


Iodinated Contrast Media Adverse Reaction (Intermediate, Verified 20 

02:17)


   Nausea








Home Medications: List reviewed with patient





Past Medical History





- General


Information source: Patient





- Social History


Smoking Status: Former Smoker


Frequency of alcohol use: None


Drug Abuse: None


Family History: CAD, DM, Hyperlipidemia, Hypertension


Patient has suicidal ideation: No


Patient has homicidal ideation: No





- Past Medical History


Cardiac Medical History: Reports: Hx Heart Attack - unsure-saw cardiologist 

 in [place],  Hypertension


   Denies: Hx Atrial Fibrillation, Hx Congestive Heart Failure, Hx Coronary 

Artery Disease, Hx Hypercholesterolemia, Hx Peripheral Vascular Disease, Hx 

Pulmonary Embolism, Hx Heart Murmur


Pulmonary Medical History: Reports: Hx Asthma, Hx Bronchitis, Hx COPD, Hx 

Pneumonia - 


   Denies: Hx Respiratory Failure, Hx Sleep Apnea, Hx Tuberculosis


Neurological Medical History: Denies: Hx Cerebrovascular Accident, Hx Seizures, 

Hx Parkinson's Disease


Endocrine Medical History: Reports: Hx Hyperthyroidism - liquid radiation.  

Denies: Hx Hypothyroidism


Malignancy Medical History: Reports: Hx Colorectal Cancer.  Denies: Hx Leukemia,

 Hx Lung Cancer


GI Medical History: Reports: Hx Gastroesophageal Reflux Disease.  Denies: Hx 

Crohn's Disease, Hx Hepatitis, Hx Hiatal Hernia, Hx Irritable Bowel, Hx Liver 

Failure, Hx Pancreatitis, Hx Ulcer


Musculoskeletal Medical History: Reports Hx Arthritis, Denies Hx Fibromyalgia, 

Denies Hx Multiple Sclerosis, Denies Hx Muscular Dystrophy, Denies Hx Systemic 

Lupus Erythematosus


Psychiatric Medical History: Reports: Hx Depression


   Denies: Hx Bipolar Disorder, Hx Dementia, Hx Post Traumatic Stress Disorder, 

Hx Schizophrenia


Traumatic Medical History: Denies: Hx Fractures


Infectious Medical History: Denies: Hx Hepatitis, Hx HIV


Past Surgical History: Reports: Hx Abdominal Surgery - COLOSTOMY, Hx Tubal 

Ligation, Other - Tumor removal of the throat which was benign.  Denies: Hx 

Appendectomy, Hx Bowel Surgery, Hx  Section, Hx Cholecystectomy, Hx 

Colostomy, Hx Coronary Artery Bypass Graft, Hx Gastric Bypass Surgery, Hx 

Herniorrhaphy, Hx Hysterectomy, Hx Mastectomy, Hx Open Heart Surgery, Hx 

Pacemaker, Hx Tonsillectomy





- Immunizations


Hx Diphtheria, Pertussis, Tetanus Vaccination: Yes - not sure





Review of Systems





- Review of Systems


Constitutional: See HPI


Gastrointestinal: See HPI


Skin: See HPI


-: Yes All other systems reviewed and negative





Physical Exam





- Vital signs


Vitals: 


                                        











Resp


 


 21 H


 


 20 17:22














- Notes


Notes: 





This is a pleasant 64-year-old female who appears her stated age in no acute 

distress.  Vital signs reviewed, please refer to chart. Head is normocephalic, 

atraumatic.  Pupils equal round, reactive to light.  Neck is supple without 

meningismus.  Heart is regular rate and rhythm.  Lungs are clear to auscultation

 bilaterally.  Abdomen is soft, nontender, normoactive bowel sounds throughout. 

 She has a colostomy noted in the right lower abdomen.  The skin around it is 

irritated, excoriated, and erythematous, consistent with irritation from the 

ostomy bag and adhesive.  Extremities without cyanosis, clubbing. Posterior 

calves are nontender.  Peripheral pulses are equal.  Patient is awake, alert, 

neurological exam is nonfocal.





Course





- Re-evaluation


Re-evalutation: 





20 19:20


Patient presents the emergency department for evaluation.  She complains of 

generalized weakness and decreased p.o. intake after instituting chemotherapy.  

CBC and CMP are returned, showed acute renal failure.  Patient is given IV 

fluids.  Still awaiting urinalysis.  At this point she will need admitted for IV

 hydration and improvement of kidney function.  Awaiting urinalysis, we will 

continue to monitor.


20 22:53


I spoke with Dr. Aguirre in regards to these results.  The patient has an 

elevated white count, UTI, acute kidney injury.  She has a metabolic acidosis 

with an anion gap.  She is given IV fluids, IV antibiotics.  I did not have a 

lactic acid, he is requested that that be completed before admission.  It is 

pending at this time, patient is stable.


20 23:50


Lactic acid is normal on this patient, will admit to Dr. Aguirre.





- Vital Signs


Vital signs: 


                                        











Temp Pulse Resp BP Pulse Ox


 


 98.1 F      21 H  105/76   97 


 


 20 21:08     20 17:22  20 17:24  20 17:33














- Laboratory


Result Diagrams: 


                                 20 16:24





                                 20 16:24


Laboratory results interpreted by me: 


                                        











  20





  16:24 16:24 20:53


 


WBC  13.6 H  


 


Hgb  11.7 L  


 


Hct  34.2 L  


 


RDW  16.1 H  


 


Seg Neuts % (Manual)  85 H  


 


Lymphocytes % (Manual)  3 L  


 


Abs Neuts (Manual)  12.1 H  


 


Abs Lymphs (Manual)  0.4 L  


 


Sodium   136.2 L 


 


Chloride   97 L 


 


Carbon Dioxide   14 L 


 


Anion Gap   25 H 


 


BUN   69 H 


 


Creatinine   4.04 H 


 


Est GFR ( Amer)   13 L 


 


Est GFR (MDRD) Non-Af   11 L 


 


Glucose   112 H 


 


Alkaline Phosphatase   285 H 


 


Urine Protein    100 H


 


Urine Blood    MODERATE H


 


Ur Leukocyte Esterase    LARGE H














- EKG Interpretation by Me


Additional EKG results interpreted by me: 





20 23:50


Normal sinus mechanism.  Left axis deviation.  Nonspecific ST changes, but no 

acute changes concerning for ischemia or infarction.





Discharge





- Discharge


Clinical Impression: 


 Acute kidney injury, Increased anion gap metabolic acidosis





Sepsis


Qualifiers:


 Sepsis type: sepsis due to unspecified organism Sepsis acute organ dysfunction 

status: with acute organ dysfunction Severe sepsis acute organ dysfunction type:

 acute renal failure Acute renal failure type: unspecified Severe sepsis shock 

status: without septic shock Qualified Code(s): A41.9 - Sepsis, unspecified 

organism





Urinary tract infection


Qualifiers:


 Urinary tract infection type: site unspecified Hematuria presence: without 

hematuria Qualified Code(s): N39.0 - Urinary tract infection, site not specified





Condition: Stable


Disposition: ADMITTED AS INPATIENT


Admitting Provider: Timothy (Hospitalist)


Unit Admitted: Telemetry


Referrals: 


JEFF CHRIS MD [Primary Care Provider] - Follow up as needed

## 2020-02-27 NOTE — EKG REPORT
SEVERITY:- ABNORMAL ECG -

SINUS RHYTHM

INFERIOR INFARCT, OLD

CONSIDER ANTERIOR INFARCT

:

Confirmed by: Brittany Moon MD 27-Feb-2020 19:40:47

## 2020-02-28 LAB
ABSOLUTE LYMPHOCYTES# (MANUAL): 0.9 10^3/UL (ref 0.5–4.7)
ABSOLUTE MONOCYTES # (MANUAL): 0.5 10^3/UL (ref 0.1–1.4)
ADD MANUAL DIFF: YES
ANION GAP SERPL CALC-SCNC: 19 MMOL/L (ref 5–19)
ANISOCYTOSIS BLD QL SMEAR: SLIGHT
BASOPHILS NFR BLD MANUAL: 0 % (ref 0–2)
BUN SERPL-MCNC: 61 MG/DL (ref 7–20)
CALCIUM: 7.9 MG/DL (ref 8.4–10.2)
CHLORIDE SERPL-SCNC: 106 MMOL/L (ref 98–107)
CO2 SERPL-SCNC: 12 MMOL/L (ref 22–30)
DACRYOCYTES BLD QL SMEAR: SLIGHT
EOSINOPHIL NFR BLD MANUAL: 0 % (ref 0–6)
ERYTHROCYTE [DISTWIDTH] IN BLOOD BY AUTOMATED COUNT: 15.9 % (ref 11.5–14)
GLUCOSE SERPL-MCNC: 91 MG/DL (ref 75–110)
HCT VFR BLD CALC: 31.5 % (ref 36–47)
HGB BLD-MCNC: 10.8 G/DL (ref 12–15.5)
MCH RBC QN AUTO: 30.9 PG (ref 27–33.4)
MCHC RBC AUTO-ENTMCNC: 34.3 G/DL (ref 32–36)
MCV RBC AUTO: 90 FL (ref 80–97)
MONOCYTES % (MANUAL): 5 % (ref 3–13)
OVALOCYTES BLD QL SMEAR: SLIGHT
PHOSPHATE SERPL-MCNC: 5.5 MG/DL (ref 2.5–4.5)
PLATELET # BLD: 345 10^3/UL (ref 150–450)
PLATELET COMMENT: ADEQUATE
POIKILOCYTOSIS BLD QL SMEAR: SLIGHT
POTASSIUM SERPL-SCNC: 4.7 MMOL/L (ref 3.6–5)
RBC # BLD AUTO: 3.49 10^6/UL (ref 3.72–5.28)
SEGMENTED NEUTROPHILS % (MAN): 86 % (ref 42–78)
TOTAL CELLS COUNTED BLD: 100
TOXIC GRANULES BLD QL SMEAR: SLIGHT
VARIANT LYMPHS NFR BLD MANUAL: 9 % (ref 13–45)
WBC # BLD AUTO: 9.6 10^3/UL (ref 4–10.5)

## 2020-02-28 RX ADMIN — INSULIN LISPRO SCH: 100 INJECTION, SOLUTION INTRAVENOUS; SUBCUTANEOUS at 18:25

## 2020-02-28 RX ADMIN — ALUMINUM HYDROXIDE, MAGNESIUM HYDROXIDE, AND SIMETHICONE PRN ML: 200; 200; 20 SUSPENSION ORAL at 14:37

## 2020-02-28 RX ADMIN — HEPARIN SODIUM SCH UNIT: 5000 INJECTION, SOLUTION INTRAVENOUS; SUBCUTANEOUS at 06:13

## 2020-02-28 RX ADMIN — SODIUM CHLORIDE PRN MLS/HR: 9 INJECTION, SOLUTION INTRAVENOUS at 14:43

## 2020-02-28 RX ADMIN — INSULIN LISPRO SCH: 100 INJECTION, SOLUTION INTRAVENOUS; SUBCUTANEOUS at 09:58

## 2020-02-28 RX ADMIN — MAGNESIUM SULFATE IN DEXTROSE SCH MLS/HR: 10 INJECTION, SOLUTION INTRAVENOUS at 07:16

## 2020-02-28 RX ADMIN — DEXTROSE, SODIUM CHLORIDE, AND POTASSIUM CHLORIDE PRN MLS/HR: 5; .45; .15 INJECTION INTRAVENOUS at 06:12

## 2020-02-28 RX ADMIN — DEXTROSE, SODIUM CHLORIDE, AND POTASSIUM CHLORIDE PRN MLS/HR: 5; .45; .15 INJECTION INTRAVENOUS at 14:37

## 2020-02-28 RX ADMIN — IMIPENEM AND CILASTATIN SODIUM SCH MLS/HR: 500; 500 INJECTION, POWDER, FOR SOLUTION INTRAVENOUS at 10:05

## 2020-02-28 RX ADMIN — HEPARIN SODIUM SCH UNIT: 5000 INJECTION, SOLUTION INTRAVENOUS; SUBCUTANEOUS at 21:38

## 2020-02-28 RX ADMIN — IMIPENEM AND CILASTATIN SODIUM SCH MLS/HR: 500; 500 INJECTION, POWDER, FOR SOLUTION INTRAVENOUS at 21:38

## 2020-02-28 RX ADMIN — INSULIN LISPRO SCH: 100 INJECTION, SOLUTION INTRAVENOUS; SUBCUTANEOUS at 13:32

## 2020-02-28 RX ADMIN — HEPARIN SODIUM SCH UNIT: 5000 INJECTION, SOLUTION INTRAVENOUS; SUBCUTANEOUS at 14:37

## 2020-02-28 RX ADMIN — MAGNESIUM SULFATE IN DEXTROSE SCH MLS/HR: 10 INJECTION, SOLUTION INTRAVENOUS at 06:12

## 2020-02-28 RX ADMIN — ACETAMINOPHEN PRN MG: 325 TABLET ORAL at 14:37

## 2020-02-28 NOTE — PDOC H&P
History of Present Illness


Admission Date/PCP: 


  20 00:10





  JEFF CHRIS MD





Patient complains of: Generalized weakness


History of Present Illness: 


SERGEI BAKER is a 64 year old female with a past medical history of 

diabetes, severe GERD and colorectal cancer status post hemicolectomy with 

ostomy 2020 on chemo radiation under the care of Dr. Huizar.  She 

complains of uncontrolled uncontrolled acid reflux, poor appetite and severe 

skin irritation about the site of her ostomy.  In the emergency department she 

is found to have acute renal failure, metabolic acidosis, hypomagnesemia and 

urinary tract infection.  She is placed on empiric antibiotics and referred to 

the hospitalist for admission.








Past Medical History


Cardiac Medical History: Reports: Myocardial Infarction - unsure-saw 

cardiologist  in [facility], [place]


   Denies: Atrial Fibrillation, Congestive Heart Failure, Coronary Artery 

Disease, Hyperlipidema, Peripheral Vascular Disease, Pulmonary Embolism, Heart 

Murmur


Pulmonary Medical History: Reports: Asthma, Bronchitis, Chronic Obstructive 

Pulmonary Disease (COPD), Pneumonia - 


   Denies: Respiratory Failure, Sleep Apnea, Tuberculosis


Neurological Medical History: 


   Denies: Seizures


Endocrine Medical History: Reports: Hyperthyroidism - liquid radiation


   Denies: Hypothyroidism


Malignancy Medical History: Reports: Colorectal Cancer


   Denies: Leukemia, Lung Cancer


GI Medical History: Reports: Gastroesophageal Reflux Disease


   Denies: Crohn's Disease, Hepatitis, Hiatal Hernia


Musculoskeltal Medical History: Reports: Arthritis


   Denies: Fibromyalgia


Psychiatric Medical History: Reports: Depression


   Denies: Bipolar Disorder, Dementia, Post Traumatic Stress Disorder


Hematology: Reports: Anemia


   Denies: Hemophilia, Sickle Cell Disease


Infectious Medical History: 


   Denies: HIV





Past Surgical History


Past Surgical History: Reports: Tubal Ligation, Other - Tumor removal of the 

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


   Denies: Amputation, Appendectomy,  Section, Cholecystectomy, 

Colostomy, Coronary Artery Bypass Graft, Gastric Bypass Surgery, Herniorrhaphy, 

Hysterectomy, Mastectomy, Pacemaker, Tonsillectomy





Social History


Information Source: Patient, [facility] Records


Smoking Status: Former Smoker


Frequency of Alcohol Use: None


Hx Recreational Drug Use: No


Drugs: None


Hx Prescription Drug Abuse: No





- Advance Directive


Resuscitation Status: Full Code





Family History


Family History: CAD, DM, Hyperlipidemia, Hypertension


Parental Family History Reviewed: Yes


Children Family History Reviewed: Yes


Sibling(s) Family History Reviewed.: Yes





Medication/Allergy


Home Medications: 








Aspirin [Adult Low Dose Aspirin EC] 81 mg PO DAILY #0 12 


Levothyroxine Sodium 125 mcg PO QAM 12 


Oxybutynin Chloride [Ditropan 5 mg Tablet] 5 mg PO TID #0 12 


Ramipril [Altace 5 mg Capsule] 5 mg PO DAILY #0 12 


Pravastatin Sodium [Pravachol] 20 mg PO QHS 16 


Albuterol Sulfate [Ventolin 0.042% Neb 1.25 mg/3 mL Ampul] 1 vial NEB QIDP PRN 

18 


Buspirone HCl [Buspar 15 mg Tablet] 1 tab PO TID 18 


Omeprazole 20 mg PO DAILY 18 


Albuterol Sulfate [Proair HFA Inhalation Aerosol 8.5 gm MDI] 2 puff IH Q4 PRN 

18 


Sertraline HCl [Zoloft] 150 mg PO DAILY 19 


Hydroxyzine HCl [Atarax 25 mg Tablet] 1 tab PO Q8HP PRN 20 


Hydrocodone Bit/Acetaminophen [Hydrocodon-Acetaminophn ] 1 each PO Q6HP 

PRN 7 Days #28 tablet 20 


Cephalexin Monohydrate [Keflex 500 mg Capsule] 500 mg PO BID 7 Days #14 capsule 

20 


Ondansetron [Zofran Odt 4 mg Tablet] 1 - 2 tab PO Q4H PRN #15 tab.rapdis 

20 


Amlodipine Besylate [Norvasc 5 mg Tablet] 5 mg PO QPM 20 


Atenolol [Tenormin] 25 mg PO QAM 20 


Buspirone HCl 15 mg PO BID 20 








Allergies/Adverse Reactions: 


                                        





shellfish derived Allergy (Severe, Verified 20 02:17)


   Hives


iodine [Iodine] Allergy (Intermediate, Verified 20 02:17)


   Rash


Sulfa (Sulfonamide Antibiotics) Allergy (Intermediate, Verified 20 02:17)


   Hives


Iodinated Contrast Media Adverse Reaction (Intermediate, Verified 20 

02:17)


   Nausea











Review of Systems


Constitutional: PRESENT: as per HPI, anorexia, fatigue, weakness.  ABSENT: 

chills, fever(s), headache(s), weight gain, weight loss


Eyes: ABSENT: visual disturbances


Ears: ABSENT: hearing changes


Cardiovascular: ABSENT: chest pain, dyspnea on exertion, edema, orthropnea, 

palpitations


Respiratory: ABSENT: cough, hemoptysis


Gastrointestinal: ABSENT: abdominal pain, constipation, diarrhea, hematemesis, 

hematochezia, nausea, vomiting


Genitourinary: ABSENT: dysuria, hematuria


Musculoskeletal: ABSENT: joint swelling


Integumentary: ABSENT: rash, wounds


Neurological: ABSENT: abnormal gait, abnormal speech, confusion, dizziness, 

focal weakness, syncope


Psychiatric: ABSENT: anxiety, depression, homidical ideation, suicidal ideation


Endocrine: ABSENT: cold intolerance, heat intolerance, polydipsia, polyuria


Hematologic/Lymphatic: ABSENT: easy bleeding, easy bruising





Physical Exam


Vital Signs: 


                                        











Temp Pulse Resp BP Pulse Ox


 


 98.1 F      19   127/108 H  99 


 


 20 21:08     20 00:01  20 00:01  20 00:01








                                 Intake & Output











 20





 11:59 11:59 11:59


 


Intake Total   2300


 


Balance   2300


 


Weight   81 kg











General appearance: PRESENT: cooperative, mild distress, well-developed, 

well-nourished


Head exam: PRESENT: atraumatic, normocephalic


Eye exam: PRESENT: conjunctiva pink, EOMI, PERRLA.  ABSENT: scleral icterus


Ear exam: PRESENT: normal external ear exam


Mouth exam: PRESENT: dry mucosa, tongue midline


Neck exam: ABSENT: carotid bruit, JVD, lymphadenopathy, thyromegaly


Respiratory exam: PRESENT: clear to auscultation susan.  ABSENT: rales, rhonchi, 

wheezes


Cardiovascular exam: PRESENT: RRR.  ABSENT: diastolic murmur, rubs, systolic 

murmur


Pulses: PRESENT: normal dorsalis pedis pul


Vascular exam: PRESENT: normal capillary refill


GI/Abdominal exam: PRESENT: normal bowel sounds, soft.  ABSENT: distended, 

guarding, mass, organolmegaly, rebound, tenderness


Rectal exam: PRESENT: deferred


Extremities exam: PRESENT: full ROM.  ABSENT: calf tenderness, clubbing, pedal 

edema


Neurological exam: PRESENT: alert, awake, oriented to person, oriented to place,

 oriented to time, oriented to situation, CN II-XII grossly intact.  ABSENT: 

motor sensory deficit


Psychiatric exam: PRESENT: appropriate affect, normal mood.  ABSENT: homicidal 

ideation, suicidal ideation


Skin exam: PRESENT: dry, erythema, intact, warm, other - Large area of right 

lower quadrant and right thigh erythema..  ABSENT: cyanosis, rash, urticaria, 

vesicles





Results


Laboratory Results: 


                                        





                                 20 16:24 





                                 20 16:24 





                                        











  20





  16:24 16:24 16:24


 


WBC  13.6 H  


 


RBC  3.77  


 


Hgb  11.7 L  


 


Hct  34.2 L  


 


MCV  91  


 


MCH  30.9  


 


MCHC  34.1  


 


RDW  16.1 H  


 


Plt Count  412  


 


Seg Neutrophils %  Not Reportable  


 


Sodium   136.2 L 


 


Potassium   4.6 


 


Chloride   97 L 


 


Carbon Dioxide   14 L 


 


Anion Gap   25 H 


 


BUN   69 H 


 


Creatinine   4.04 H 


 


Est GFR ( Amer)   13 L 


 


Glucose   112 H 


 


Lactic Acid   


 


Calcium   9.2 


 


Phosphorus    5.5 H


 


Magnesium    0.8 L*


 


Total Bilirubin   0.4 


 


AST   28 


 


Alkaline Phosphatase   285 H 


 


Total Protein   8.0 


 


Albumin   4.1 


 


Urine Color   


 


Urine Appearance   


 


Urine pH   


 


Ur Specific Gravity   


 


Urine Protein   


 


Urine Glucose (UA)   


 


Urine Ketones   


 


Urine Blood   


 


Urine Nitrite   


 


Ur Leukocyte Esterase   


 


Urine WBC (Auto)   


 


Urine RBC (Auto)   














  20





  20:53 22:30


 


WBC  


 


RBC  


 


Hgb  


 


Hct  


 


MCV  


 


MCH  


 


MCHC  


 


RDW  


 


Plt Count  


 


Seg Neutrophils %  


 


Sodium  


 


Potassium  


 


Chloride  


 


Carbon Dioxide  


 


Anion Gap  


 


BUN  


 


Creatinine  


 


Est GFR (African Amer)  


 


Glucose  


 


Lactic Acid   1.1


 


Calcium  


 


Phosphorus  


 


Magnesium  


 


Total Bilirubin  


 


AST  


 


Alkaline Phosphatase  


 


Total Protein  


 


Albumin  


 


Urine Color  YELLOW 


 


Urine Appearance  TURBID 


 


Urine pH  5.0 


 


Ur Specific Gravity  1.013 


 


Urine Protein  100 H 


 


Urine Glucose (UA)  NEGATIVE 


 


Urine Ketones  NEGATIVE 


 


Urine Blood  MODERATE H 


 


Urine Nitrite  NEGATIVE 


 


Ur Leukocyte Esterase  LARGE H 


 


Urine WBC (Auto)  >182 


 


Urine RBC (Auto)  45 














Assessment and Plan





- Diagnosis


(1) Sepsis


Qualifiers: 


   Sepsis type: sepsis due to unspecified organism   Sepsis acute organ 

dysfunction status: with acute organ dysfunction   Severe sepsis acute organ 

dysfunction type: acute renal failure   Acute renal failure type: unspecified   

Severe sepsis shock status: without septic shock   Qualified Code(s): A41.9 - 

Sepsis, unspecified organism; R65.20 - Severe sepsis without septic shock; N17.9

 - Acute kidney failure, unspecified   


Is this a current diagnosis for this admission?: Yes   


Plan: 


IV fluid challenge, imipenem as Rocephin is poorly sensitive to E. coli in 

culture.








(2) Acute kidney injury


Is this a current diagnosis for this admission?: Yes   


Plan: 


Likely prerenal azotemia, IV fluid challenge, avoid nephrotoxic meds and doses 

follow-up chemistry








(3) Increased anion gap metabolic acidosis


Is this a current diagnosis for this admission?: Yes   


Plan: 


Multifactorial, acute renal failure, starvation, diabetes.  D5 half-normal 

saline with 20 of potassium with Humalog sliding scale.








(4) Cellulitis of colostomy


Is this a current diagnosis for this admission?: Yes   


Plan: 


Cellulitis versus radiation versus dysfunction with leakage chemical burn 

follow-up surgical evaluation.








(5) Urinary tract infection


Qualifiers: 


   Urinary tract infection type: site unspecified   Hematuria presence: without 

hematuria   Qualified Code(s): N39.0 - Urinary tract infection, site not 

specified   


Is this a current diagnosis for this admission?: Yes   


Plan: 


Imipenem ordered.  Follow-up blood and urine culture.








- Time


Time Spent with patient: 25-34 minutes





- Inpatient Certification


Medical Necessity: Need Close Monitoring Due to Risk of Patient Decompensation

## 2020-02-28 NOTE — PDOC PROGRESS REPORT
Subjective


Progress Note for:: 02/28/20


Subjective:: 





64 year old female with a past medical history of diabetes, severe GERD and 

colorectal cancer status post hemicolectomy with ostomy January 2020 on chemo 

radiation under the care of Dr. Huizar.  She complains of uncontrolled 

uncontrolled acid reflux, poor appetite and severe skin irritation about the 

site of her ostomy.  In the emergency department she is found to have acute 

renal failure, metabolic acidosis, hypomagnesemia and urinary tract infection.  

She is placed on empiric antibiotics and referred to the hospitalist for 

admission.








2/28/[id number]-mpyp-old female with history of type 2 diabetes mellitus, rectal 

cancer status post hemicolectomy with colostomy this year and chemoradiation 

therapy admitted with poor appetite, skin irritation around the ostomy site.  

Work-up in the emergency room indicates acute renal failure associated with 

metabolic acidosis, hypomagnesemia and UTI.  Patient is receiving potassium 

supplementations IV fluids and empiric antibiotic therapy.  For the blood in the

bed communicating well.  Patient is evaluated by Dr. Liriano he recommended 

paste around the ostomy site.


Reason For Visit: 


UTI,SEPSIS ARF








Physical Exam


Vital Signs: 


                                        











Temp Pulse Resp BP Pulse Ox


 


 98.2 F      15   94/69 L  100 


 


 02/28/20 06:16     02/28/20 06:16  02/28/20 06:16  02/28/20 06:16








                                 Intake & Output











 02/27/20 02/28/20 02/29/20





 06:59 06:59 06:59


 


Intake Total  3400 200


 


Balance  3400 200


 


Weight  81 kg 











General appearance: PRESENT: no acute distress, well-developed


Head exam: PRESENT: atraumatic


Eye exam: PRESENT: PERRLA


Mouth exam: PRESENT: moist, tongue midline


Teeth exam: PRESENT: poor dentation


Neck exam: ABSENT: carotid bruit, JVD, lymphadenopathy, thyromegaly


Respiratory exam: PRESENT: clear to auscultation susan.  ABSENT: rales, rhonchi, 

wheezes


Cardiovascular exam: PRESENT: RRR.  ABSENT: diastolic murmur, rubs, systolic 

murmur


GI/Abdominal exam: PRESENT: other - Ostomy bag in place.  Bowel sounds are 

present.  Nontender.


Rectal exam: PRESENT: deferred


Extremities exam: PRESENT: full ROM.  ABSENT: calf tenderness, clubbing, pedal 

edema


Neurological exam: PRESENT: alert, awake, oriented to person, oriented to place,

oriented to time, oriented to situation, CN II-XII grossly intact.  ABSENT: 

motor sensory deficit


Psychiatric exam: PRESENT: appropriate affect, normal mood.  ABSENT: homicidal 

ideation, suicidal ideation





Results


Laboratory Results: 


                                        





                                 02/28/20 05:11 





                                 02/28/20 05:11 





                                        











  02/27/20 02/27/20 02/27/20





  16:24 16:24 16:24


 


WBC  13.6 H  


 


RBC  3.77  


 


Hgb  11.7 L  


 


Hct  34.2 L  


 


MCV  91  


 


MCH  30.9  


 


MCHC  34.1  


 


RDW  16.1 H  


 


Plt Count  412  


 


Seg Neutrophils %  Not Reportable  


 


Sodium   136.2 L 


 


Potassium   4.6 


 


Chloride   97 L 


 


Carbon Dioxide   14 L 


 


Anion Gap   25 H 


 


BUN   69 H 


 


Creatinine   4.04 H 


 


Est GFR ( Amer)   13 L 


 


Glucose   112 H 


 


Lactic Acid   


 


Calcium   9.2 


 


Phosphorus    5.5 H


 


Magnesium    0.8 L*


 


Total Bilirubin   0.4 


 


AST   28 


 


Alkaline Phosphatase   285 H 


 


Total Protein   8.0 


 


Albumin   4.1 


 


Urine Color   


 


Urine Appearance   


 


Urine pH   


 


Ur Specific Gravity   


 


Urine Protein   


 


Urine Glucose (UA)   


 


Urine Ketones   


 


Urine Blood   


 


Urine Nitrite   


 


Ur Leukocyte Esterase   


 


Urine WBC (Auto)   


 


Urine RBC (Auto)   














  02/27/20 02/27/20 02/28/20





  20:53 22:30 05:11


 


WBC    9.6


 


RBC    3.49 L


 


Hgb    10.8 L


 


Hct    31.5 L


 


MCV    90


 


MCH    30.9


 


MCHC    34.3


 


RDW    15.9 H


 


Plt Count    345


 


Seg Neutrophils %    Not Reportable


 


Sodium   


 


Potassium   


 


Chloride   


 


Carbon Dioxide   


 


Anion Gap   


 


BUN   


 


Creatinine   


 


Est GFR (African Amer)   


 


Glucose   


 


Lactic Acid   1.1 


 


Calcium   


 


Phosphorus   


 


Magnesium   


 


Total Bilirubin   


 


AST   


 


Alkaline Phosphatase   


 


Total Protein   


 


Albumin   


 


Urine Color  YELLOW  


 


Urine Appearance  TURBID  


 


Urine pH  5.0  


 


Ur Specific Gravity  1.013  


 


Urine Protein  100 H  


 


Urine Glucose (UA)  NEGATIVE  


 


Urine Ketones  NEGATIVE  


 


Urine Blood  MODERATE H  


 


Urine Nitrite  NEGATIVE  


 


Ur Leukocyte Esterase  LARGE H  


 


Urine WBC (Auto)  >182  


 


Urine RBC (Auto)  45  














  02/28/20





  05:11


 


WBC 


 


RBC 


 


Hgb 


 


Hct 


 


MCV 


 


MCH 


 


MCHC 


 


RDW 


 


Plt Count 


 


Seg Neutrophils % 


 


Sodium  136.5 L


 


Potassium  4.7


 


Chloride  106


 


Carbon Dioxide  12 L


 


Anion Gap  19


 


BUN  61 H


 


Creatinine  2.68 H


 


Est GFR (African Amer)  22 L


 


Glucose  91


 


Lactic Acid 


 


Calcium  7.9 L


 


Phosphorus 


 


Magnesium 


 


Total Bilirubin 


 


AST 


 


Alkaline Phosphatase 


 


Total Protein 


 


Albumin 


 


Urine Color 


 


Urine Appearance 


 


Urine pH 


 


Ur Specific Gravity 


 


Urine Protein 


 


Urine Glucose (UA) 


 


Urine Ketones 


 


Urine Blood 


 


Urine Nitrite 


 


Ur Leukocyte Esterase 


 


Urine WBC (Auto) 


 


Urine RBC (Auto) 














Assessment and Plan





- Diagnosis


(1) Sepsis


Qualifiers: 


   Sepsis type: sepsis due to unspecified organism   Sepsis acute organ d

ysfunction status: with acute organ dysfunction   Severe sepsis acute organ 

dysfunction type: acute renal failure   Acute renal failure type: unspecified   

Severe sepsis shock status: without septic shock   Qualified Code(s): A41.9 - 

Sepsis, unspecified organism; R65.20 - Severe sepsis without septic shock; N17.9

- Acute kidney failure, unspecified   


Is this a current diagnosis for this admission?: Yes   


Plan: 


IV fluid challenge, imipenem as Rocephin is poorly sensitive to E. coli in 

culture.





2020-patient admitted for sepsis possible secondary to UTI.  Patient is 

presently on imipenem.  Cultures are pending.  And is afebrile.  Pressure is 

running low with systolic around 90s.  Patient is receiving IV fluids normal 

saline at 250 cc/h.








(2) Acute kidney injury


Is this a current diagnosis for this admission?: Yes   


Plan: 


Likely prerenal azotemia, IV fluid challenge, avoid nephrotoxic meds and doses 

follow-up chemistry





2820-patient admitted with acute kidney injury receiving IV fluids normal saline

at 250 cc/h.  Creatinine is 2.68 improved from 4.04..  Most likely prerenal 

causes.  Plan is to continue the IV fluids.  To recheck the labs tomorrow.  

Nonoliguric.








(3) Increased anion gap metabolic acidosis


Is this a current diagnosis for this admission?: Yes   


Plan: 


Multifactorial, acute renal failure, starvation, diabetes.  D5 half-normal 

saline with 20 of potassium with Humalog sliding scale.





2/20/2020-patient admitted with increased non-anion gap metabolic acidosis.  On 

D5 half-normal saline with 20 mg of potassium with Humalog sliding scale.  It 

can be secondary to sepsis.








(4) Cellulitis of colostomy


Is this a current diagnosis for this admission?: Yes   


Plan: 


Cellulitis versus radiation versus dysfunction with leakage chemical burn 

follow-up surgical evaluation.





2/28/20-surgical evaluation was done.  Recommendation is put the paste around 

the colostomy site.








(5) Urinary tract infection


Qualifiers: 


   Urinary tract infection type: site unspecified   Hematuria presence: without 

hematuria   Qualified Code(s): N39.0 - Urinary tract infection, site not 

specified   


Is this a current diagnosis for this admission?: Yes   


Plan: 


Imipenem ordered.  Follow-up blood and urine culture.











(7) Hypomagnesemia


Is this a current diagnosis for this admission?: Yes   


Plan: 


2/28/2020-admission serum magnesium is 0.8.  Provide magnesium supplementations.

## 2020-02-28 NOTE — PDOC CONSULTATION
Consultation


Consult Date: 20


Provider Consulted: ANDREW STEWART


Consult reason:: Burning sensation around ileostomy site





History of Present Illness


Admission Date/PCP: 


  20 00:10





  JEFF CHRIS MD





History of Present Illness: 


SERGEI BAKER is a 64 year old female history of diabetes mellitus, GERD, 

and low anterior resection for colon carcinoma with diverting ileostomy 1/10/2

020 by .  Patient admitted for generalized weakness and noted to have 

sepsis, acute renal failure, and complaining of burning sensation along the 

ileostomy site.  Patient had chemotherapy by Dr. Ledbetter.  Her ileostomy care is 

being handled by her roommate.








Past Medical History


Cardiac Medical History: Reports: Myocardial Infarction - unsure-saw card

iologist  in [facility]


   Denies: Atrial Fibrillation, Congestive Heart Failure, Coronary Artery 

Disease, Hyperlipidema, Peripheral Vascular Disease, Pulmonary Embolism, Heart 

Murmur


Pulmonary Medical History: Reports: Asthma, Bronchitis, Chronic Obstructive 

Pulmonary Disease (COPD), Pneumonia - 


   Denies: Respiratory Failure, Sleep Apnea, Tuberculosis


Neurological Medical History: 


   Denies: Seizures


Endocrine Medical History: Reports: Hyperthyroidism - liquid radiation


   Denies: Hypothyroidism


Malignancy Medical History: Reports: Colorectal Cancer


   Denies: Leukemia, Lung Cancer


GI Medical History: Reports: Gastroesophageal Reflux Disease


   Denies: Crohn's Disease, Hepatitis, Hiatal Hernia


Musculoskeltal Medical History: Reports: Arthritis


   Denies: Fibromyalgia


Psychiatric Medical History: Reports: Depression


   Denies: Bipolar Disorder, Dementia, Post Traumatic Stress Disorder


Hematology: Reports: Anemia


   Denies: Hemophilia, Sickle Cell Disease


Infectious Medical History: 


   Denies: HIV





Past Surgical History


Past Surgical History: Reports: Tubal Ligation, Other - Tumor removal of the 

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


   Denies: Amputation, Appendectomy,  Section, Cholecystectomy, 

Colostomy, Coronary Artery Bypass Graft, Gastric Bypass Surgery, Herniorrhaphy, 

Hysterectomy, Mastectomy, Pacemaker, Tonsillectomy





Social History


Smoking Status: Former Smoker


Frequency of Alcohol Use: None


Hx Recreational Drug Use: No


Drugs: None


Hx Prescription Drug Abuse: No





- Advance Directive


Resuscitation Status: Full Code





Family History


Family History: CAD, DM, Hyperlipidemia, Hypertension


Parental Family History Reviewed: No


Children Family History Reviewed: No


Sibling(s) Family History Reviewed.: No





Medication/Allergy


Home Medications: 








Aspirin [Adult Low Dose Aspirin EC] 81 mg PO DAILY #0 12 


Levothyroxine Sodium 125 mcg PO QAM 12 


Oxybutynin Chloride [Ditropan 5 mg Tablet] 5 mg PO TID #0 12 


Ramipril [Altace 5 mg Capsule] 5 mg PO DAILY #0 12 


Pravastatin Sodium [Pravachol] 20 mg PO QHS 16 


Albuterol Sulfate [Ventolin 0.042% Neb 1.25 mg/3 mL Ampul] 1 vial NEB QIDP PRN 

18 


Buspirone HCl [Buspar 15 mg Tablet] 1 tab PO TID 18 


Omeprazole 20 mg PO DAILY 18 


Albuterol Sulfate [Proair HFA Inhalation Aerosol 8.5 gm MDI] 2 puff IH Q4 PRN 

18 


Sertraline HCl [Zoloft] 150 mg PO DAILY 19 


Hydroxyzine HCl [Atarax 25 mg Tablet] 1 tab PO Q8HP PRN 20 


Hydrocodone Bit/Acetaminophen [Hydrocodon-Acetaminophn ] 1 each PO Q6HP 

PRN 7 Days #28 tablet 20 


Cephalexin Monohydrate [Keflex 500 mg Capsule] 500 mg PO BID 7 Days #14 capsule 

20 


Ondansetron [Zofran Odt 4 mg Tablet] 1 - 2 tab PO Q4H PRN #15 tab.rapdis 

20 


Amlodipine Besylate [Norvasc 5 mg Tablet] 5 mg PO QPM 20 


Atenolol [Tenormin] 25 mg PO QAM 20 


Buspirone HCl 15 mg PO BID 20 








Allergies/Adverse Reactions: 


                                        





shellfish derived Allergy (Severe, Verified 20 02:17)


   Hives


iodine [Iodine] Allergy (Intermediate, Verified 20 02:17)


   Rash


Sulfa (Sulfonamide Antibiotics) Allergy (Intermediate, Verified 20 02:17)


   Hives


Iodinated Contrast Media Adverse Reaction (Intermediate, Verified 20 

02:17)


   Nausea











Review of Systems


Constitutional: PRESENT: as per HPI


Gastrointestinal: ABSENT: other - Burning sensation around the ileostomy site





Physical Exam


Vital Signs: 


                                        











Temp Pulse Resp BP Pulse Ox


 


 98.2 F      15   94/69 L  100 


 


 20 06:16     20 06:16  20 06:16  20 06:16








                                 Intake & Output











 20





 06:59 06:59 06:59


 


Intake Total  3400 100


 


Balance  3400 100


 


Weight  81 kg 











Exam: 





Ileostomy appliance is intact at this time.  However patient complains of 

burning sensation just around the ileostomy.  Ileostomy is draining light solid 

material.





Results


Laboratory Results: 


                                        





                                 20 05:11 





                                 20 05:11 





                                        











  20





  16:24 16:24 16:24


 


WBC  13.6 H  


 


RBC  3.77  


 


Hgb  11.7 L  


 


Hct  34.2 L  


 


MCV  91  


 


MCH  30.9  


 


MCHC  34.1  


 


RDW  16.1 H  


 


Plt Count  412  


 


Seg Neutrophils %  Not Reportable  


 


Sodium   136.2 L 


 


Potassium   4.6 


 


Chloride   97 L 


 


Carbon Dioxide   14 L 


 


Anion Gap   25 H 


 


BUN   69 H 


 


Creatinine   4.04 H 


 


Est GFR ( Amer)   13 L 


 


Glucose   112 H 


 


Lactic Acid   


 


Calcium   9.2 


 


Phosphorus    5.5 H


 


Magnesium    0.8 L*


 


Total Bilirubin   0.4 


 


AST   28 


 


Alkaline Phosphatase   285 H 


 


Total Protein   8.0 


 


Albumin   4.1 


 


Urine Color   


 


Urine Appearance   


 


Urine pH   


 


Ur Specific Gravity   


 


Urine Protein   


 


Urine Glucose (UA)   


 


Urine Ketones   


 


Urine Blood   


 


Urine Nitrite   


 


Ur Leukocyte Esterase   


 


Urine WBC (Auto)   


 


Urine RBC (Auto)   














  20





  20:53 22:30 05:11


 


WBC    9.6


 


RBC    3.49 L


 


Hgb    10.8 L


 


Hct    31.5 L


 


MCV    90


 


MCH    30.9


 


MCHC    34.3


 


RDW    15.9 H


 


Plt Count    345


 


Seg Neutrophils %    Not Reportable


 


Sodium   


 


Potassium   


 


Chloride   


 


Carbon Dioxide   


 


Anion Gap   


 


BUN   


 


Creatinine   


 


Est GFR (African Amer)   


 


Glucose   


 


Lactic Acid   1.1 


 


Calcium   


 


Phosphorus   


 


Magnesium   


 


Total Bilirubin   


 


AST   


 


Alkaline Phosphatase   


 


Total Protein   


 


Albumin   


 


Urine Color  YELLOW  


 


Urine Appearance  TURBID  


 


Urine pH  5.0  


 


Ur Specific Gravity  1.013  


 


Urine Protein  100 H  


 


Urine Glucose (UA)  NEGATIVE  


 


Urine Ketones  NEGATIVE  


 


Urine Blood  MODERATE H  


 


Urine Nitrite  NEGATIVE  


 


Ur Leukocyte Esterase  LARGE H  


 


Urine WBC (Auto)  >182  


 


Urine RBC (Auto)  45  














  20





  05:11


 


WBC 


 


RBC 


 


Hgb 


 


Hct 


 


MCV 


 


MCH 


 


MCHC 


 


RDW 


 


Plt Count 


 


Seg Neutrophils % 


 


Sodium  136.5 L


 


Potassium  4.7


 


Chloride  106


 


Carbon Dioxide  12 L


 


Anion Gap  19


 


BUN  61 H


 


Creatinine  2.68 H


 


Est GFR (African Amer)  22 L


 


Glucose  91


 


Lactic Acid 


 


Calcium  7.9 L


 


Phosphorus 


 


Magnesium 


 


Total Bilirubin 


 


AST 


 


Alkaline Phosphatase 


 


Total Protein 


 


Albumin 


 


Urine Color 


 


Urine Appearance 


 


Urine pH 


 


Ur Specific Gravity 


 


Urine Protein 


 


Urine Glucose (UA) 


 


Urine Ketones 


 


Urine Blood 


 


Urine Nitrite 


 


Ur Leukocyte Esterase 


 


Urine WBC (Auto) 


 


Urine RBC (Auto) 














Assessment & Plan





- Diagnosis


(1) Irritation of the skin around ileostomy 


Is this a current diagnosis for this admission?: Yes   





- Time


Time Spent: 30 to 50 Minutes





- Plan Summary


Plan Summary: 





Placement of ostomy paste around ileostomy prior to placement of appliance.





We will sign off call for any questions.

## 2020-02-29 LAB
ABSOLUTE LYMPHOCYTES# (MANUAL): 0.5 10^3/UL (ref 0.5–4.7)
ABSOLUTE MONOCYTES # (MANUAL): 0.3 10^3/UL (ref 0.1–1.4)
ADD MANUAL DIFF: YES
ALBUMIN SERPL-MCNC: 2.7 G/DL (ref 3.5–5)
ALP SERPL-CCNC: 268 U/L (ref 38–126)
ANION GAP SERPL CALC-SCNC: 12 MMOL/L (ref 5–19)
ANISOCYTOSIS BLD QL SMEAR: (no result)
AST SERPL-CCNC: 33 U/L (ref 14–36)
BASOPHILS NFR BLD MANUAL: 0 % (ref 0–2)
BILIRUB DIRECT SERPL-MCNC: 0 MG/DL (ref 0–0.4)
BILIRUB SERPL-MCNC: 0.2 MG/DL (ref 0.2–1.3)
BUN SERPL-MCNC: 36 MG/DL (ref 7–20)
CALCIUM: 7.7 MG/DL (ref 8.4–10.2)
CHLORIDE SERPL-SCNC: 113 MMOL/L (ref 98–107)
CO2 SERPL-SCNC: 14 MMOL/L (ref 22–30)
EOSINOPHIL NFR BLD MANUAL: 1 % (ref 0–6)
ERYTHROCYTE [DISTWIDTH] IN BLOOD BY AUTOMATED COUNT: 16.3 % (ref 11.5–14)
GLUCOSE SERPL-MCNC: 91 MG/DL (ref 75–110)
HCT VFR BLD CALC: 30.6 % (ref 36–47)
HGB BLD-MCNC: 10 G/DL (ref 12–15.5)
MCH RBC QN AUTO: 30 PG (ref 27–33.4)
MCHC RBC AUTO-ENTMCNC: 32.7 G/DL (ref 32–36)
MCV RBC AUTO: 92 FL (ref 80–97)
MONOCYTES % (MANUAL): 4 % (ref 3–13)
NEUTS BAND NFR BLD MANUAL: 3 % (ref 3–5)
PLATELET # BLD: 319 10^3/UL (ref 150–450)
PLATELET COMMENT: ADEQUATE
POTASSIUM SERPL-SCNC: 4.9 MMOL/L (ref 3.6–5)
PROT SERPL-MCNC: 5.7 G/DL (ref 6.3–8.2)
RBC # BLD AUTO: 3.33 10^6/UL (ref 3.72–5.28)
SEGMENTED NEUTROPHILS % (MAN): 86 % (ref 42–78)
TOTAL CELLS COUNTED BLD: 100
VARIANT LYMPHS NFR BLD MANUAL: 6 % (ref 13–45)
WBC # BLD AUTO: 8.5 10^3/UL (ref 4–10.5)

## 2020-02-29 RX ADMIN — SODIUM CHLORIDE PRN MLS/HR: 9 INJECTION, SOLUTION INTRAVENOUS at 05:51

## 2020-02-29 RX ADMIN — INSULIN LISPRO SCH: 100 INJECTION, SOLUTION INTRAVENOUS; SUBCUTANEOUS at 07:23

## 2020-02-29 RX ADMIN — SODIUM CHLORIDE PRN MLS/HR: 9 INJECTION, SOLUTION INTRAVENOUS at 00:09

## 2020-02-29 RX ADMIN — HEPARIN SODIUM SCH UNIT: 5000 INJECTION, SOLUTION INTRAVENOUS; SUBCUTANEOUS at 22:34

## 2020-02-29 RX ADMIN — IMIPENEM AND CILASTATIN SODIUM SCH MLS/HR: 500; 500 INJECTION, POWDER, FOR SOLUTION INTRAVENOUS at 23:02

## 2020-02-29 RX ADMIN — HEPARIN SODIUM SCH UNIT: 5000 INJECTION, SOLUTION INTRAVENOUS; SUBCUTANEOUS at 13:39

## 2020-02-29 RX ADMIN — ACETAMINOPHEN PRN MG: 325 TABLET ORAL at 00:04

## 2020-02-29 RX ADMIN — PANTOPRAZOLE SODIUM SCH MG: 40 TABLET, DELAYED RELEASE ORAL at 17:37

## 2020-02-29 RX ADMIN — SUCRALFATE SCH GM: 1 TABLET ORAL at 22:33

## 2020-02-29 RX ADMIN — SODIUM CHLORIDE PRN MLS/HR: 9 INJECTION, SOLUTION INTRAVENOUS at 12:16

## 2020-02-29 RX ADMIN — IMIPENEM AND CILASTATIN SODIUM SCH MLS/HR: 500; 500 INJECTION, POWDER, FOR SOLUTION INTRAVENOUS at 10:30

## 2020-02-29 RX ADMIN — OXYBUTYNIN CHLORIDE SCH MG: 5 TABLET ORAL at 19:12

## 2020-02-29 RX ADMIN — BUSPIRONE HYDROCHLORIDE SCH MG: 10 TABLET ORAL at 19:12

## 2020-02-29 RX ADMIN — HEPARIN SODIUM SCH UNIT: 5000 INJECTION, SOLUTION INTRAVENOUS; SUBCUTANEOUS at 05:51

## 2020-02-29 RX ADMIN — ACETAMINOPHEN PRN MG: 325 TABLET ORAL at 07:38

## 2020-02-29 NOTE — PDOC PROGRESS REPORT
Subjective


Progress Note for:: 02/29/20


Subjective:: 


SERGEI BAKER is a 64 year old female with a past medical history of 

diabetes, severe GERD and colorectal cancer status post hemicolectomy with 

ostomy January 2020 on chemo radiation under the care of Dr. Huizar.  She 

complains of uncontrolled uncontrolled acid reflux, poor appetite and severe 

skin irritation about the site of her ostomy.  In the emergency department she 

is found to have acute renal failure, metabolic acidosis, hypomagnesemia and 

urinary tract infection.  She is placed on empiric antibiotics and referred to 

the hospitalist for admission.





2/29/2020.  No acute events overnight.  Patient currently resting in bed no 

apparent distress.  Patient still having problem with leakage around the ostomy 

site otherwise no complaints.  Patient was able to walk with assistance of 

walker and primary nurse with no problems, patient alert oriented x3 no apparent

distress.  P.o. tolerant.


Reason For Visit: 


UTI,SEPSIS ARF








Physical Exam


Vital Signs: 


                                        











Temp Pulse Resp BP Pulse Ox


 


 97.8 F   98   18   90/67 L  100 


 


 02/29/20 11:15  02/29/20 14:00  02/29/20 11:15  02/29/20 11:15  02/29/20 11:15








                                 Intake & Output











 02/28/20 02/29/20 03/01/20





 06:59 06:59 06:59


 


Intake Total 3400 4400 240


 


Output Total  200 


 


Balance 3400 4200 240


 


Weight 81 kg 81.5 kg 











General appearance: PRESENT: no acute distress, well-developed, well-nourished


Head exam: PRESENT: atraumatic, normocephalic


Respiratory exam: PRESENT: clear to auscultation susan.  ABSENT: rales, rhonchi, 

wheezes


Cardiovascular exam: PRESENT: RRR.  ABSENT: diastolic murmur, rubs, systolic mu

rmur


GI/Abdominal exam: PRESENT: normal bowel sounds, soft, other - Ostomy is patent,

there is no apparent leakage with observation however patient states that 

whenever he she moves starts leaking, diffuse erythema over abdomen and gluteal 

region with no apparent sign of infection..  ABSENT: distended, guarding, mass, 

organolmegaly, rebound, tenderness





Results


Laboratory Results: 


                                        





                                 02/29/20 04:55 





                                 02/29/20 04:55 





                                        











  02/29/20 02/29/20





  04:55 04:55


 


WBC  8.5 


 


RBC  3.33 L 


 


Hgb  10.0 L 


 


Hct  30.6 L 


 


MCV  92 


 


MCH  30.0 


 


MCHC  32.7 


 


RDW  16.3 H 


 


Plt Count  319 


 


Seg Neutrophils %  Not Reportable 


 


Sodium   138.7


 


Potassium   4.9


 


Chloride   113 H


 


Carbon Dioxide   14 L


 


Anion Gap   12


 


BUN   36 H D


 


Creatinine   1.38 H


 


Est GFR (African Amer)   46 L


 


Glucose   91


 


Calcium   7.7 L


 


Magnesium   1.4 L


 


Total Bilirubin   0.2


 


AST   33


 


Alkaline Phosphatase   268 H


 


Total Protein   5.7 L


 


Albumin   2.7 L














Assessment and Plan





- Diagnosis


(1) Sepsis


Qualifiers: 


   Sepsis type: sepsis due to unspecified organism   Sepsis acute organ 

dysfunction status: with acute organ dysfunction   Severe sepsis acute organ 

dysfunction type: acute renal failure   Acute renal failure type: unspecified   

Severe sepsis shock status: without septic shock   Qualified Code(s): A41.9 - 

Sepsis, unspecified organism; R65.20 - Severe sepsis without septic shock; N17.9

- Acute kidney failure, unspecified   


Is this a current diagnosis for this admission?: Yes   


Plan: 


Resolved.  Vitals WNL.


Likely due to underlying UTI.


Urine culture growing gram-negative rods pending sensitivity.  Blood cultures 

negative so far.





Day 3 IV antibiotic.


Day 3 IV imipenem.


Monitor vitals.  Continue volume resuscitation guided by volume status and 

vitals.  








(2) Cellulitis of colostomy


Is this a current diagnosis for this admission?: Yes   


Plan: 


Improving.  No apparent sign of infection.  Diffuse erythema.  


Patient has diffuse erythema over lower abdomen and inguinal region, no apparent

leakage or collection, this is likely cellulitis/patient induced or skin 

irritation from ostomy leakage of GI contents. 





Continue empiric IV antibiotics.  Continue ostomy care.


Surgery consulted.  Recommendations noted.








(3) Acute kidney injury


Is this a current diagnosis for this admission?: Yes   


Plan: 


Moderate improvement.  Euvolemic.  Electrolytes WNL.  


Likely prerenal azotemia due to underlying sepsis.


Continue cautious IV hydration guided by volume status and vitals.


Avoid nephrotoxic meds.  BMP tomorrow.  








(4) Urinary tract infection


Qualifiers: 


   Urinary tract infection type: site unspecified   Hematuria presence: without 

hematuria   Qualified Code(s): N39.0 - Urinary tract infection, site not 

specified   


Is this a current diagnosis for this admission?: Yes   


Plan: 


Due to gram-negative rods.  Urine culture positive for gram-negative rods 

pending sensitivity.


Continue empiric IV antibiotics.  Follow-up urine culture.  Switch to p.o. once 

culture and sensitivity are available.








(5) Increased anion gap metabolic acidosis


Is this a current diagnosis for this admission?: Yes   


Plan: 


Resolved.  Anion gap WNL.  Lactic acid WNL.  


Multifactorial, acute renal failure, starvation, diabetes. 








(6) Hypothyroidism


Qualifiers: 


   Hypothyroidism type: unspecified   Qualified Code(s): E03.9 - Hypothyroidism,

unspecified   


Is this a current diagnosis for this admission?: Yes   


Plan: 


Restart home meds.  Outpatient PCP follow-up.








(7) Hypomagnesemia


Is this a current diagnosis for this admission?: Yes   


Plan: 


Likely due to GI losses and low p.o. intake.


Continue daily supplementation.  Magnesium level tomorrow.  Replace as needed.








(8) Hyperlipidemia


Qualifiers: 


   Hyperlipidemia type: unspecified   Qualified Code(s): E78.5 - Hyperlipidemia,

unspecified   


Is this a current diagnosis for this admission?: Yes   


Plan: 


Restart home meds.  Outpatient PCP follow-up.








(9) Generalized anxiety disorder


Is this a current diagnosis for this admission?: Yes   


Plan: 


Takes BuSpar at home.  Restart home meds.








(10) Gastroesophageal reflux disease


Qualifiers: 


   Esophagitis presence: without esophagitis   Qualified Code(s): K21.9 - 

Gastro-esophageal reflux disease without esophagitis   


Is this a current diagnosis for this admission?: Yes   


Plan: 


Chronic severe GERD.


Will start on PPIs and sucralfate.


Outpatient PCP and gastroenterology follow-up.








(11) Depression


Qualifiers: 


   Depression Type: major depressive disorder   Major depression recurrence: 

single episode   Active/Remission status: remission status unspecified   

Qualified Code(s): F32.9 - Major depressive disorder, single episode, 

unspecified   


Is this a current diagnosis for this admission?: Yes   


Plan: 


Denies any suicidal or homicidal ideation.


Takes SSRI at home.  Resume home meds.  Outpatient PCP follow-up.

## 2020-03-01 LAB
ANION GAP SERPL CALC-SCNC: 10 MMOL/L (ref 5–19)
BUN SERPL-MCNC: 20 MG/DL (ref 7–20)
CALCIUM: 8.4 MG/DL (ref 8.4–10.2)
CHLORIDE SERPL-SCNC: 115 MMOL/L (ref 98–107)
CO2 SERPL-SCNC: 17 MMOL/L (ref 22–30)
ERYTHROCYTE [DISTWIDTH] IN BLOOD BY AUTOMATED COUNT: 16.2 % (ref 11.5–14)
GLUCOSE SERPL-MCNC: 105 MG/DL (ref 75–110)
HCT VFR BLD CALC: 31.8 % (ref 36–47)
HGB BLD-MCNC: 10.5 G/DL (ref 12–15.5)
MCH RBC QN AUTO: 30 PG (ref 27–33.4)
MCHC RBC AUTO-ENTMCNC: 33 G/DL (ref 32–36)
MCV RBC AUTO: 91 FL (ref 80–97)
PLATELET # BLD: 399 10^3/UL (ref 150–450)
POTASSIUM SERPL-SCNC: 4.6 MMOL/L (ref 3.6–5)
RBC # BLD AUTO: 3.5 10^6/UL (ref 3.72–5.28)
WBC # BLD AUTO: 7 10^3/UL (ref 4–10.5)

## 2020-03-01 RX ADMIN — SUCRALFATE SCH GM: 1 TABLET ORAL at 21:17

## 2020-03-01 RX ADMIN — OXYBUTYNIN CHLORIDE SCH MG: 5 TABLET ORAL at 17:32

## 2020-03-01 RX ADMIN — HEPARIN SODIUM SCH UNIT: 5000 INJECTION, SOLUTION INTRAVENOUS; SUBCUTANEOUS at 05:29

## 2020-03-01 RX ADMIN — IMIPENEM AND CILASTATIN SODIUM SCH MLS/HR: 500; 500 INJECTION, POWDER, FOR SOLUTION INTRAVENOUS at 21:16

## 2020-03-01 RX ADMIN — IMIPENEM AND CILASTATIN SODIUM SCH MLS/HR: 500; 500 INJECTION, POWDER, FOR SOLUTION INTRAVENOUS at 09:51

## 2020-03-01 RX ADMIN — AMLODIPINE BESYLATE SCH MG: 5 TABLET ORAL at 17:31

## 2020-03-01 RX ADMIN — ACETAMINOPHEN PRN MG: 325 TABLET ORAL at 05:31

## 2020-03-01 RX ADMIN — OXYBUTYNIN CHLORIDE SCH MG: 5 TABLET ORAL at 09:50

## 2020-03-01 RX ADMIN — PANTOPRAZOLE SODIUM SCH MG: 40 TABLET, DELAYED RELEASE ORAL at 05:30

## 2020-03-01 RX ADMIN — SERTRALINE HYDROCHLORIDE SCH MG: 50 TABLET ORAL at 09:51

## 2020-03-01 RX ADMIN — OXYBUTYNIN CHLORIDE SCH MG: 5 TABLET ORAL at 14:27

## 2020-03-01 RX ADMIN — BUSPIRONE HYDROCHLORIDE SCH MG: 10 TABLET ORAL at 17:31

## 2020-03-01 RX ADMIN — RAMIPRIL SCH MG: 5 CAPSULE ORAL at 09:50

## 2020-03-01 RX ADMIN — BUSPIRONE HYDROCHLORIDE SCH MG: 10 TABLET ORAL at 14:27

## 2020-03-01 RX ADMIN — HEPARIN SODIUM SCH UNIT: 5000 INJECTION, SOLUTION INTRAVENOUS; SUBCUTANEOUS at 21:16

## 2020-03-01 RX ADMIN — HEPARIN SODIUM SCH UNIT: 5000 INJECTION, SOLUTION INTRAVENOUS; SUBCUTANEOUS at 14:27

## 2020-03-01 RX ADMIN — BUSPIRONE HYDROCHLORIDE SCH MG: 10 TABLET ORAL at 09:50

## 2020-03-01 RX ADMIN — ASPIRIN SCH MG: 81 TABLET, COATED ORAL at 09:50

## 2020-03-01 RX ADMIN — PANTOPRAZOLE SODIUM SCH MG: 40 TABLET, DELAYED RELEASE ORAL at 17:32

## 2020-03-01 RX ADMIN — ATENOLOL SCH MG: 50 TABLET ORAL at 07:51

## 2020-03-01 RX ADMIN — SUCRALFATE SCH GM: 1 TABLET ORAL at 12:40

## 2020-03-01 RX ADMIN — SUCRALFATE SCH GM: 1 TABLET ORAL at 17:33

## 2020-03-01 RX ADMIN — SUCRALFATE SCH GM: 1 TABLET ORAL at 07:51

## 2020-03-01 RX ADMIN — MAGNESIUM OXIDE TAB 400 MG (241.3 MG ELEMENTAL MG) SCH MG: 400 (241.3 MG) TAB at 17:32

## 2020-03-01 RX ADMIN — MAGNESIUM OXIDE TAB 400 MG (241.3 MG ELEMENTAL MG) SCH MG: 400 (241.3 MG) TAB at 14:27

## 2020-03-01 NOTE — PDOC PROGRESS REPORT
Subjective


Progress Note for:: 03/01/20


Subjective:: 


SERGEI BAKER is a 64 year old female with a past medical history of 

diabetes, severe GERD and colorectal cancer status post hemicolectomy with 

ostomy January 2020 on chemo radiation under the care of Dr. Huizar.  She 

complains of uncontrolled uncontrolled acid reflux, poor appetite and severe 

skin irritation about the site of her ostomy.  In the emergency department she 

is found to have acute renal failure, metabolic acidosis, hypomagnesemia and 

urinary tract infection.  She is placed on empiric antibiotics and referred to 

the hospitalist for admission.





2/29/2020.  No acute events overnight.  Patient currently resting in bed no 

apparent distress.  Patient still having problem with leakage around the ostomy 

site otherwise no complaints.  Patient was able to walk with assistance of 

walker and primary nurse with no problems, patient alert oriented x3 no apparent

distress.  P.o. tolerant.





03/01/2020. No acute events overnight.  Patient currently resting in bed no 

apparent distress.  Patient still having problem with leakage around the ostomy 

site otherwise no complaints. 


Reason For Visit: 


UTI,SEPSIS ARF








Physical Exam


Vital Signs: 


                                        











Temp Pulse Resp BP Pulse Ox


 


 98.2 F   77   18   134/86 H  100 


 


 03/01/20 08:15  03/01/20 08:15  03/01/20 08:15  03/01/20 08:15  03/01/20 08:15








                                 Intake & Output











 02/29/20 03/01/20 03/02/20





 06:59 06:59 06:59


 


Intake Total 4400 240 


 


Output Total 200 400 


 


Balance 4200 -160 


 


Weight 81.5 kg 71.2 kg 











General appearance: PRESENT: no acute distress, well-developed, well-nourished


Head exam: PRESENT: atraumatic, normocephalic


Respiratory exam: PRESENT: clear to auscultation susan.  ABSENT: rales, rhonchi, 

wheezes


Cardiovascular exam: PRESENT: RRR.  ABSENT: diastolic murmur, rubs, systolic 

murmur


GI/Abdominal exam: PRESENT: normal bowel sounds, soft, other - Ostomy leakage is

minimal, significant improvement of abdominal wall erythema..  ABSENT: 

distended, guarding, mass, organolmegaly, rebound, tenderness


Neurological exam: PRESENT: alert, awake, oriented to person, oriented to place,

oriented to time, oriented to situation, CN II-XII grossly intact.  ABSENT: 

motor sensory deficit





Results


Laboratory Results: 


                                        





                                 03/01/20 05:59 





                                 03/01/20 05:59 





                                        











  03/01/20 03/01/20





  05:59 05:59


 


WBC  7.0 


 


RBC  3.50 L 


 


Hgb  10.5 L 


 


Hct  31.8 L 


 


MCV  91 


 


MCH  30.0 


 


MCHC  33.0 


 


RDW  16.2 H 


 


Plt Count  399 


 


Sodium   141.6


 


Potassium   4.6


 


Chloride   115 H


 


Carbon Dioxide   17 L


 


Anion Gap   10


 


BUN   20


 


Creatinine   0.93


 


Est GFR (African Amer)   > 60


 


Glucose   105


 


Calcium   8.4


 


Magnesium   1.4 L














Assessment and Plan





- Diagnosis


(1) Sepsis


Qualifiers: 


   Sepsis type: sepsis due to unspecified organism   Sepsis acute organ 

dysfunction status: with acute organ dysfunction   Severe sepsis acute organ 

dysfunction type: acute renal failure   Acute renal failure type: unspecified   

Severe sepsis shock status: without septic shock   Qualified Code(s): A41.9 - 

Sepsis, unspecified organism; R65.20 - Severe sepsis without septic shock; N17.9

- Acute kidney failure, unspecified   


Is this a current diagnosis for this admission?: Yes   


Plan: 


Resolved.  Vitals WNL.


Likely due to underlying UTI.


Urine culture growing gram-negative rods pending sensitivity.  Blood cultures 

negative so far.





Day 4 IV antibiotic.


Day 4 IV imipenem.


Monitor vitals.  Continue volume resuscitation guided by volume status and 

vitals.  








(2) Cellulitis of colostomy


Is this a current diagnosis for this admission?: Yes   


Plan: 


Moderate improvement.  Significant improvement of abdominal wall erythema, mild 

erythema around the ostomy site.    


Patient presented with diffuse erythema over lower abdomen and inguinal region, 

no apparent leakage or collection, this is likely cellulitis/patient induced or 

skin irritation from ostomy leakage of GI contents. 





Continue empiric IV antibiotics.  Continue ostomy care.


Surgery consulted.  Recommendations noted.








(3) Acute kidney injury


Is this a current diagnosis for this admission?: Yes   


Plan: 


Resolved. Euvolemic.  Electrolytes WNL.  


Likely prerenal azotemia due to underlying sepsis.


Continue cautious IV hydration guided by volume status and vitals.


Avoid nephrotoxic meds.  BMP tomorrow.  








(4) Urinary tract infection


Qualifiers: 


   Urinary tract infection type: site unspecified   Hematuria presence: without 

hematuria   Qualified Code(s): N39.0 - Urinary tract infection, site not 

specified   


Is this a current diagnosis for this admission?: Yes   


Plan: 


Due to gram-negative rods.  Urine culture positive for gram-negative rods 

pending sensitivity.


Continue empiric IV antibiotics.  Follow-up urine culture.  Switch to p.o. once 

culture and sensitivity are available.








(5) Increased anion gap metabolic acidosis


Is this a current diagnosis for this admission?: Yes   


Plan: 


Resolved.  Anion gap WNL.  Lactic acid WNL.  


Multifactorial, acute renal failure, starvation, diabetes. 








(6) Hypothyroidism


Qualifiers: 


   Hypothyroidism type: unspecified   Qualified Code(s): E03.9 - Hypothyroidism,

unspecified   


Is this a current diagnosis for this admission?: Yes   


Plan: 


Restart home meds.  Outpatient PCP follow-up.








(7) Hypomagnesemia


Is this a current diagnosis for this admission?: Yes   


Plan: 


Likely due to GI losses and low p.o. intake.


Continue daily supplementation.  Magnesium level tomorrow.  Replace as needed.








(8) Hyperlipidemia


Qualifiers: 


   Hyperlipidemia type: unspecified   Qualified Code(s): E78.5 - Hyperlipidemia,

unspecified   


Is this a current diagnosis for this admission?: Yes   


Plan: 


Restart home meds.  Outpatient PCP follow-up.








(9) Generalized anxiety disorder


Is this a current diagnosis for this admission?: Yes   


Plan: 


Takes BuSpar at home.  Restart home meds.








(10) Gastroesophageal reflux disease


Qualifiers: 


   Esophagitis presence: without esophagitis   Qualified Code(s): K21.9 - 

Gastro-esophageal reflux disease without esophagitis   


Is this a current diagnosis for this admission?: Yes   


Plan: 


Chronic severe GERD.


Will start on PPIs and sucralfate.


Outpatient PCP and gastroenterology follow-up.








(11) Depression


Qualifiers: 


   Depression Type: major depressive disorder   Major depression recurrence: 

single episode   Active/Remission status: remission status unspecified   

Qualified Code(s): F32.9 - Major depressive disorder, single episode, 

unspecified   


Is this a current diagnosis for this admission?: Yes   


Plan: 


Denies any suicidal or homicidal ideation.


Takes SSRI at home.  Resume home meds.  Outpatient PCP follow-up.

## 2020-03-02 RX ADMIN — OXYBUTYNIN CHLORIDE SCH MG: 5 TABLET ORAL at 17:51

## 2020-03-02 RX ADMIN — LEVOTHYROXINE SODIUM SCH MG: 25 TABLET ORAL at 05:16

## 2020-03-02 RX ADMIN — SUCRALFATE SCH GM: 1 TABLET ORAL at 07:53

## 2020-03-02 RX ADMIN — HEPARIN SODIUM SCH UNIT: 5000 INJECTION, SOLUTION INTRAVENOUS; SUBCUTANEOUS at 21:23

## 2020-03-02 RX ADMIN — SUCRALFATE SCH GM: 1 TABLET ORAL at 21:23

## 2020-03-02 RX ADMIN — ACETAMINOPHEN PRN MG: 325 TABLET ORAL at 19:51

## 2020-03-02 RX ADMIN — OXYBUTYNIN CHLORIDE SCH MG: 5 TABLET ORAL at 16:11

## 2020-03-02 RX ADMIN — HEPARIN SODIUM SCH UNIT: 5000 INJECTION, SOLUTION INTRAVENOUS; SUBCUTANEOUS at 05:16

## 2020-03-02 RX ADMIN — SODIUM CHLORIDE PRN MLS/HR: 9 INJECTION, SOLUTION INTRAVENOUS at 19:57

## 2020-03-02 RX ADMIN — SUCRALFATE SCH GM: 1 TABLET ORAL at 16:11

## 2020-03-02 RX ADMIN — ALUMINUM HYDROXIDE, MAGNESIUM HYDROXIDE, AND SIMETHICONE PRN ML: 200; 200; 20 SUSPENSION ORAL at 07:56

## 2020-03-02 RX ADMIN — BUSPIRONE HYDROCHLORIDE SCH MG: 10 TABLET ORAL at 16:11

## 2020-03-02 RX ADMIN — ASPIRIN SCH MG: 81 TABLET, COATED ORAL at 12:52

## 2020-03-02 RX ADMIN — PANTOPRAZOLE SODIUM SCH MG: 40 TABLET, DELAYED RELEASE ORAL at 16:11

## 2020-03-02 RX ADMIN — BUSPIRONE HYDROCHLORIDE SCH MG: 10 TABLET ORAL at 12:53

## 2020-03-02 RX ADMIN — IMIPENEM AND CILASTATIN SODIUM SCH MLS/HR: 500; 500 INJECTION, POWDER, FOR SOLUTION INTRAVENOUS at 19:51

## 2020-03-02 RX ADMIN — BUSPIRONE HYDROCHLORIDE SCH MG: 10 TABLET ORAL at 17:52

## 2020-03-02 RX ADMIN — MAGNESIUM OXIDE TAB 400 MG (241.3 MG ELEMENTAL MG) SCH MG: 400 (241.3 MG) TAB at 12:53

## 2020-03-02 RX ADMIN — HYDROXYZINE PAMOATE PRN MG: 25 CAPSULE ORAL at 01:18

## 2020-03-02 RX ADMIN — HEPARIN SODIUM SCH UNIT: 5000 INJECTION, SOLUTION INTRAVENOUS; SUBCUTANEOUS at 16:11

## 2020-03-02 RX ADMIN — SUCRALFATE SCH GM: 1 TABLET ORAL at 12:52

## 2020-03-02 RX ADMIN — SERTRALINE HYDROCHLORIDE SCH MG: 50 TABLET ORAL at 12:53

## 2020-03-02 RX ADMIN — PANTOPRAZOLE SODIUM SCH MG: 40 TABLET, DELAYED RELEASE ORAL at 05:16

## 2020-03-02 RX ADMIN — RAMIPRIL SCH MG: 5 CAPSULE ORAL at 12:52

## 2020-03-02 RX ADMIN — MAGNESIUM OXIDE TAB 400 MG (241.3 MG ELEMENTAL MG) SCH MG: 400 (241.3 MG) TAB at 17:51

## 2020-03-02 RX ADMIN — LEVOTHYROXINE SODIUM SCH MG: 100 TABLET ORAL at 05:16

## 2020-03-02 RX ADMIN — IMIPENEM AND CILASTATIN SODIUM SCH MLS/HR: 500; 500 INJECTION, POWDER, FOR SOLUTION INTRAVENOUS at 13:27

## 2020-03-02 RX ADMIN — OXYBUTYNIN CHLORIDE SCH MG: 5 TABLET ORAL at 12:52

## 2020-03-02 RX ADMIN — AMLODIPINE BESYLATE SCH MG: 5 TABLET ORAL at 17:52

## 2020-03-02 RX ADMIN — IMIPENEM AND CILASTATIN SODIUM SCH MLS/HR: 500; 500 INJECTION, POWDER, FOR SOLUTION INTRAVENOUS at 23:09

## 2020-03-02 RX ADMIN — ATENOLOL SCH MG: 50 TABLET ORAL at 07:53

## 2020-03-02 NOTE — PDOC PROGRESS REPORT
Subjective


Progress Note for:: 03/02/20


Subjective:: 


SERGEI BAKER is a 64 year old female with a past medical history of 

diabetes, severe GERD and colorectal cancer status post hemicolectomy with 

ostomy January 2020 on chemo radiation under the care of Dr. Huizar.  She 

complains of uncontrolled uncontrolled acid reflux, poor appetite and severe 

skin irritation about the site of her ostomy.  In the emergency department she 

is found to have acute renal failure, metabolic acidosis, hypomagnesemia and 

urinary tract infection.  She is placed on empiric antibiotics and referred to 

the hospitalist for admission.





2/29/2020.  No acute events overnight.  Patient currently resting in bed no 

apparent distress.  Patient still having problem with leakage around the ostomy 

site otherwise no complaints.  Patient was able to walk with assistance of 

walker and primary nurse with no problems, patient alert oriented x3 no apparent

distress.  P.o. tolerant.





03/01/2020. No acute events overnight.  Patient currently resting in bed no 

apparent distress.  Patient still having problem with leakage around the ostomy 

site otherwise no complaints. 





3/2/2020.  No acute events overnight.  Ostomy leak improving, abdominal erythema

has resolved, denies any fever, chills, nausea, vomiting, diarrhea, constipation

or any urinary symptoms.  Patient stated that she is supposed to have her 

reanastomosis tomorrow and has scheduled appointment with Dr. Echols, I 

contacted Dr. Valderrama from surgery who is stating that patient has history of 

noncompliance and she is not ready to be reanastomosed yet , he recommends for 

patient to be transferred to rehab where she gets proper ostomy care and 

hopefully in the future for reanastomosis of her colectomy.


Reason For Visit: 


UTI,SEPSIS ARF








Physical Exam


Vital Signs: 


                                        











Temp Pulse Resp BP Pulse Ox


 


 98.5 F   72   18   126/87 H  99 


 


 03/02/20 08:06  03/02/20 08:06  03/02/20 08:06  03/02/20 08:06  03/02/20 08:06








                                 Intake & Output











 03/01/20 03/02/20 03/03/20





 06:59 06:59 06:59


 


Intake Total 240 1525 


 


Output Total 400 130 


 


Balance -160 1395 


 


Weight 71.2 kg 71.2 kg 











General appearance: PRESENT: no acute distress, well-developed, well-nourished


Head exam: PRESENT: atraumatic, normocephalic


Respiratory exam: PRESENT: clear to auscultation susan.  ABSENT: rales, rhonchi, 

wheezes


Cardiovascular exam: PRESENT: RRR.  ABSENT: diastolic murmur, rubs, systolic 

murmur


GI/Abdominal exam: PRESENT: normal bowel sounds, soft, other - Resolution of 

abdominal erythema/cellulitis, mild erythema in the right inguinal fold and 

under the OsteoMed patch on the right side..  ABSENT: distended, guarding, mass,

organolmegaly, rebound, tenderness


Neurological exam: PRESENT: alert, awake, oriented to person, oriented to place,

oriented to time, oriented to situation, CN II-XII grossly intact.  ABSENT: 

motor sensory deficit





Results


Laboratory Results: 


                                        





                                 03/01/20 05:59 





                                 03/01/20 05:59 





                                        











  03/02/20 03/02/20





  05:01 07:06


 


Magnesium  Cancelled  1.3 L














Assessment and Plan





- Diagnosis


(1) Sepsis


Qualifiers: 


   Sepsis type: sepsis due to unspecified organism   Sepsis acute organ 

dysfunction status: with acute organ dysfunction   Severe sepsis acute organ 

dysfunction type: acute renal failure   Acute renal failure type: unspecified   

Severe sepsis shock status: without septic shock   Qualified Code(s): A41.9 - 

Sepsis, unspecified organism; R65.20 - Severe sepsis without septic shock; N17.9

- Acute kidney failure, unspecified   


Is this a current diagnosis for this admission?: Yes   


Plan: 


Likely due to underlying UTI.


Urine culture growing gram-negative rods pending sensitivity.  Blood cultures 

negative so far.





Day 5 IV antibiotic.


Day 5 IV imipenem.


Monitor vitals.  Continue volume resuscitation guided by volume status and 

vitals.








(2) Cellulitis of colostomy


Is this a current diagnosis for this admission?: Yes   


Plan: 


Moderate improvement.  Significant improvement of abdominal wall erythema, mild 

erythema around the ostomy site.    


Patient presented with diffuse erythema over lower abdomen and inguinal region, 

no apparent leakage or collection, this is likely cellulitis/patient induced or 

skin irritation from ostomy leakage of GI contents. 





Continue empiric IV antibiotics.  Continue ostomy care.


Surgery consulted.  Recommendations noted.








(3) Acute kidney injury


Is this a current diagnosis for this admission?: Yes   


Plan: 


Resolved. Euvolemic.  Electrolytes WNL.  


Likely prerenal azotemia due to underlying sepsis.


Continue cautious IV hydration guided by volume status and vitals.


Avoid nephrotoxic meds.  BMP tomorrow.  














(4) Urinary tract infection


Qualifiers: 


   Urinary tract infection type: site unspecified   Hematuria presence: without 

hematuria   Qualified Code(s): N39.0 - Urinary tract infection, site not 

specified   


Is this a current diagnosis for this admission?: Yes   


Plan: 


Due to gram-negative rods.  Urine culture positive for gram-negative rods 

pending sensitivity.


Continue empiric IV antibiotics.  Follow-up urine culture.  Switch to p.o. once 

culture and sensitivity are available.








(5) Increased anion gap metabolic acidosis


Is this a current diagnosis for this admission?: Yes   


Plan: 


Resolved.  Anion gap WNL.  Lactic acid WNL.  


Multifactorial, acute renal failure, starvation, diabetes. 








(6) Hypothyroidism


Qualifiers: 


   Hypothyroidism type: unspecified   Qualified Code(s): E03.9 - Hypothyroidism,

unspecified   


Is this a current diagnosis for this admission?: Yes   


Plan: 


Restart home meds.  Outpatient PCP follow-up.








(7) Hypomagnesemia


Is this a current diagnosis for this admission?: Yes   


Plan: 


Likely due to GI losses and low p.o. intake.


Continue daily supplementation.  Magnesium level tomorrow.  Replace as needed.








(8) Hyperlipidemia


Qualifiers: 


   Hyperlipidemia type: unspecified   Qualified Code(s): E78.5 - Hyperlipidemia,

unspecified   


Is this a current diagnosis for this admission?: Yes   


Plan: 


Restart home meds.  Outpatient PCP follow-up.








(9) Generalized anxiety disorder


Is this a current diagnosis for this admission?: Yes   


Plan: 


Takes BuSpar at home.  Restart home meds.








(10) Gastroesophageal reflux disease


Qualifiers: 


   Esophagitis presence: without esophagitis   Qualified Code(s): K21.9 - 

Gastro-esophageal reflux disease without esophagitis   


Is this a current diagnosis for this admission?: Yes   


Plan: 


Chronic severe GERD.


Will start on PPIs and sucralfate.


Outpatient PCP and gastroenterology follow-up.








(11) Depression


Qualifiers: 


   Depression Type: major depressive disorder   Major depression recurrence: 

single episode   Active/Remission status: remission status unspecified   

Qualified Code(s): F32.9 - Major depressive disorder, single episode, 

unspecified   


Is this a current diagnosis for this admission?: Yes   


Plan: 


Denies any suicidal or homicidal ideation.


Takes SSRI at home.  Resume home meds.  Outpatient PCP follow-up.

## 2020-03-03 LAB
ANION GAP SERPL CALC-SCNC: 11 MMOL/L (ref 5–19)
BUN SERPL-MCNC: 14 MG/DL (ref 7–20)
CALCIUM: 8.9 MG/DL (ref 8.4–10.2)
CHLORIDE SERPL-SCNC: 112 MMOL/L (ref 98–107)
CO2 SERPL-SCNC: 17 MMOL/L (ref 22–30)
GLUCOSE SERPL-MCNC: 93 MG/DL (ref 75–110)
POTASSIUM SERPL-SCNC: 4.6 MMOL/L (ref 3.6–5)

## 2020-03-03 RX ADMIN — ATENOLOL SCH MG: 50 TABLET ORAL at 10:12

## 2020-03-03 RX ADMIN — SUCRALFATE SCH GM: 1 TABLET ORAL at 10:11

## 2020-03-03 RX ADMIN — SUCRALFATE SCH GM: 1 TABLET ORAL at 17:25

## 2020-03-03 RX ADMIN — SUCRALFATE SCH GM: 1 TABLET ORAL at 21:18

## 2020-03-03 RX ADMIN — LEVOTHYROXINE SODIUM SCH MG: 25 TABLET ORAL at 05:34

## 2020-03-03 RX ADMIN — HEPARIN SODIUM SCH UNIT: 5000 INJECTION, SOLUTION INTRAVENOUS; SUBCUTANEOUS at 17:25

## 2020-03-03 RX ADMIN — PANTOPRAZOLE SODIUM SCH MG: 40 TABLET, DELAYED RELEASE ORAL at 16:56

## 2020-03-03 RX ADMIN — HEPARIN SODIUM SCH UNIT: 5000 INJECTION, SOLUTION INTRAVENOUS; SUBCUTANEOUS at 05:35

## 2020-03-03 RX ADMIN — PANTOPRAZOLE SODIUM SCH MG: 40 TABLET, DELAYED RELEASE ORAL at 05:34

## 2020-03-03 RX ADMIN — IMIPENEM AND CILASTATIN SODIUM SCH MLS/HR: 500; 500 INJECTION, POWDER, FOR SOLUTION INTRAVENOUS at 05:34

## 2020-03-03 RX ADMIN — IMIPENEM AND CILASTATIN SODIUM SCH MLS/HR: 500; 500 INJECTION, POWDER, FOR SOLUTION INTRAVENOUS at 17:32

## 2020-03-03 RX ADMIN — IMIPENEM AND CILASTATIN SODIUM SCH MLS/HR: 500; 500 INJECTION, POWDER, FOR SOLUTION INTRAVENOUS at 11:44

## 2020-03-03 RX ADMIN — OXYBUTYNIN CHLORIDE SCH: 5 TABLET ORAL at 17:34

## 2020-03-03 RX ADMIN — RAMIPRIL SCH MG: 5 CAPSULE ORAL at 10:11

## 2020-03-03 RX ADMIN — OXYBUTYNIN CHLORIDE SCH MG: 5 TABLET ORAL at 10:11

## 2020-03-03 RX ADMIN — ASPIRIN SCH MG: 81 TABLET, COATED ORAL at 10:11

## 2020-03-03 RX ADMIN — IMIPENEM AND CILASTATIN SODIUM SCH MLS/HR: 500; 500 INJECTION, POWDER, FOR SOLUTION INTRAVENOUS at 23:05

## 2020-03-03 RX ADMIN — BUSPIRONE HYDROCHLORIDE SCH MG: 10 TABLET ORAL at 10:09

## 2020-03-03 RX ADMIN — MAGNESIUM OXIDE TAB 400 MG (241.3 MG ELEMENTAL MG) SCH MG: 400 (241.3 MG) TAB at 10:11

## 2020-03-03 RX ADMIN — BUSPIRONE HYDROCHLORIDE SCH: 10 TABLET ORAL at 17:34

## 2020-03-03 RX ADMIN — HEPARIN SODIUM SCH UNIT: 5000 INJECTION, SOLUTION INTRAVENOUS; SUBCUTANEOUS at 21:18

## 2020-03-03 RX ADMIN — SERTRALINE HYDROCHLORIDE SCH MG: 50 TABLET ORAL at 10:10

## 2020-03-03 RX ADMIN — ACETAMINOPHEN PRN MG: 325 TABLET ORAL at 23:05

## 2020-03-03 RX ADMIN — OXYBUTYNIN CHLORIDE SCH MG: 5 TABLET ORAL at 17:25

## 2020-03-03 RX ADMIN — HYDROXYZINE PAMOATE PRN MG: 25 CAPSULE ORAL at 23:05

## 2020-03-03 RX ADMIN — MAGNESIUM OXIDE TAB 400 MG (241.3 MG ELEMENTAL MG) SCH MG: 400 (241.3 MG) TAB at 17:33

## 2020-03-03 RX ADMIN — LEVOTHYROXINE SODIUM SCH MG: 100 TABLET ORAL at 05:34

## 2020-03-03 RX ADMIN — SUCRALFATE SCH GM: 1 TABLET ORAL at 11:56

## 2020-03-03 RX ADMIN — BUSPIRONE HYDROCHLORIDE SCH MG: 10 TABLET ORAL at 17:24

## 2020-03-03 NOTE — PDOC PROGRESS REPORT
Subjective


Progress Note for:: 03/03/20


Subjective:: 


SERGEI BAKER is a 64 year old female with a past medical history of 

diabetes, severe GERD and colorectal cancer status post hemicolectomy with 

ostomy January 2020 on chemo radiation under the care of Dr. Huizar.  She 

complains of uncontrolled uncontrolled acid reflux, poor appetite and severe 

skin irritation about the site of her ostomy.  In the emergency department she 

is found to have acute renal failure, metabolic acidosis, hypomagnesemia and 

urinary tract infection.  She is placed on empiric antibiotics and referred to 

the hospitalist for admission.





2/29/2020.  No acute events overnight.  Patient currently resting in bed no 

apparent distress.  Patient still having problem with leakage around the ostomy 

site otherwise no complaints.  Patient was able to walk with assistance of 

walker and primary nurse with no problems, patient alert oriented x3 no apparent

distress.  P.o. tolerant.





03/01/2020. No acute events overnight.  Patient currently resting in bed no 

apparent distress.  Patient still having problem with leakage around the ostomy 

site otherwise no complaints. 





3/2/2020.  No acute events overnight.  Ostomy leak improving, abdominal erythema

has resolved, denies any fever, chills, nausea, vomiting, diarrhea, constipation

or any urinary symptoms.  Patient stated that she is supposed to have her 

reanastomosis tomorrow and has scheduled appointment with Dr. Echols, I 

contacted Dr. Valderrama from surgery who is stating that patient has history of 

noncompliance and she is not ready to be reanastomosed yet , he recommends for 

patient to be transferred to rehab where she gets proper ostomy care and 

hopefully in the future for reanastomosis of her colectomy.





3/3/2020.  No acute events overnight.  Patient still having some leakage of a

bdominal irritation from her ostomy site otherwise denies any fever, chills, 

nausea, vomiting, diarrhea, constipation or any urinary symptoms.  Patient 

notified about my conversation with Dr. Nayak from surgery that she is not 

ready for reanastomosis and it is better that she can go to a skilled nurse 

facility where where she can get ostomy care.  Patient has agreed to be 

transitioned to SNF but does not want to go back to [facility].  Discharge 

planner on board.


Reason For Visit: 


UTI,SEPSIS ARF








Physical Exam


Vital Signs: 


                                        











Temp Pulse Resp BP Pulse Ox


 


 98.1 F   77   20   117/88 H  99 


 


 03/03/20 08:00  03/03/20 08:00  03/03/20 08:00  03/03/20 08:00  03/03/20 08:00








                                 Intake & Output











 03/02/20 03/03/20 03/04/20





 06:59 06:59 06:59


 


Intake Total 1525 730 


 


Output Total 130  


 


Balance 1395 730 


 


Weight 71.2 kg 71.2 kg 











General appearance: PRESENT: no acute distress, well-developed, well-nourished


Head exam: PRESENT: atraumatic, normocephalic


Respiratory exam: PRESENT: clear to auscultation susan.  ABSENT: rales, rhonchi, 

wheezes


Cardiovascular exam: PRESENT: RRR.  ABSENT: diastolic murmur, rubs, systolic 

murmur


Pulses: PRESENT: normal dorsalis pedis pul


GI/Abdominal exam: PRESENT: normal bowel sounds, soft, other - Ostomy patent.  

Felt like feculent material.  Mild erythema on the lateral side of the abdomen, 

mild leakage..  ABSENT: distended, guarding, mass, organolmegaly, rebound, 

tenderness


Rectal exam: PRESENT: deferred


Neurological exam: PRESENT: alert, awake, oriented to person, oriented to place,

oriented to time, oriented to situation, CN II-XII grossly intact.  ABSENT: 

motor sensory deficit





Results


Laboratory Results: 


                                        





                                 03/01/20 05:59 





                                 03/03/20 05:00 





                                        











  03/03/20





  05:00


 


Sodium  140.4


 


Potassium  4.6


 


Chloride  112 H


 


Carbon Dioxide  17 L


 


Anion Gap  11


 


BUN  14


 


Creatinine  0.86


 


Est GFR (African Amer)  > 60


 


Glucose  93


 


Calcium  8.9


 


Magnesium  1.4 L








                                        





02/27/20 20:53   Clean Catch Midstream   Urine Culture - Final


                            Klebsiella(Enterobac)Aerogenes











Assessment and Plan





- Diagnosis


(1) Cellulitis of colostomy


Is this a current diagnosis for this admission?: Yes   


Plan: 


Moderate improvement.  Significant improvement of abdominal wall erythema, mild 

erythema around the ostomy site.    


Patient presented with diffuse erythema over lower abdomen and inguinal region, 

no apparent leakage or collection, this is likely cellulitis/patient induced or 

skin irritation from ostomy leakage of GI contents. 





Continue empiric IV antibiotics.  Continue ostomy care.


Surgery consulted.  Recommendations noted.








(2) Acute kidney injury


Is this a current diagnosis for this admission?: Yes   


Plan: 


Resolved. Euvolemic.  Electrolytes WNL.  


Likely prerenal azotemia due to underlying sepsis.


Continue cautious IV hydration guided by volume status and vitals.


Avoid nephrotoxic meds.  BMP tomorrow.  














(3) Urinary tract infection


Qualifiers: 


   Urinary tract infection type: site unspecified   Hematuria presence: without 

hematuria   Qualified Code(s): N39.0 - Urinary tract infection, site not 

specified   


Is this a current diagnosis for this admission?: Yes   


Plan: 


Due to Klebsiella sensitive to quinolones.


Received a complete course of IV antibiotics.








(4) Increased anion gap metabolic acidosis


Is this a current diagnosis for this admission?: Yes   


Plan: 


Resolved.  Anion gap WNL.  Lactic acid WNL.  


Multifactorial, acute renal failure, starvation, diabetes. 








(5) Hypothyroidism


Qualifiers: 


   Hypothyroidism type: unspecified   Qualified Code(s): E03.9 - Hypothyroidism,

unspecified   


Is this a current diagnosis for this admission?: Yes   


Plan: 


Restart home meds.  Outpatient PCP follow-up.








(6) Hypomagnesemia


Is this a current diagnosis for this admission?: Yes   


Plan: 


Likely due to GI losses and low p.o. intake.


Continue daily supplementation.  Magnesium level tomorrow.  Replace as needed.








(7) Hyperlipidemia


Qualifiers: 


   Hyperlipidemia type: unspecified   Qualified Code(s): E78.5 - Hyperlipidemia,

unspecified   


Is this a current diagnosis for this admission?: Yes   


Plan: 


Restart home meds.  Outpatient PCP follow-up.








(8) Generalized anxiety disorder


Is this a current diagnosis for this admission?: Yes   


Plan: 


Takes BuSpar at home.  Restart home meds.








(9) Gastroesophageal reflux disease


Qualifiers: 


   Esophagitis presence: without esophagitis   Qualified Code(s): K21.9 - 

Gastro-esophageal reflux disease without esophagitis   


Is this a current diagnosis for this admission?: Yes   


Plan: 


Chronic severe GERD.


Will start on PPIs and sucralfate.


Outpatient PCP and gastroenterology follow-up.








(10) Depression


Qualifiers: 


   Depression Type: major depressive disorder   Major depression recurrence: 

single episode   Active/Remission status: remission status unspecified   

Qualified Code(s): F32.9 - Major depressive disorder, single episode, 

unspecified   


Is this a current diagnosis for this admission?: Yes   


Plan: 


Denies any suicidal or homicidal ideation.


Takes SSRI at home.  Resume home meds.  Outpatient PCP follow-up.








(11) Sepsis


Qualifiers: 


   Sepsis type: sepsis due to unspecified organism   Sepsis acute organ 

dysfunction status: with acute organ dysfunction   Severe sepsis acute organ 

dysfunction type: acute renal failure   Acute renal failure type: unspecified   

Severe sepsis shock status: without septic shock   Qualified Code(s): A41.9 - 

Sepsis, unspecified organism; R65.20 - Severe sepsis without septic shock; N17.9

- Acute kidney failure, unspecified   


Is this a current diagnosis for this admission?: Yes   


Plan: 


Resolved.


Likely due to underlying UTI.  Urine culture positive for Klebsiella.  Cultures 

no growth.


Received 6 days of empiric IV imipenem.

## 2020-03-04 VITALS — SYSTOLIC BLOOD PRESSURE: 115 MMHG | DIASTOLIC BLOOD PRESSURE: 71 MMHG

## 2020-03-04 LAB
ANION GAP SERPL CALC-SCNC: 11 MMOL/L (ref 5–19)
BUN SERPL-MCNC: 13 MG/DL (ref 7–20)
CALCIUM: 9.2 MG/DL (ref 8.4–10.2)
CHLORIDE SERPL-SCNC: 110 MMOL/L (ref 98–107)
CO2 SERPL-SCNC: 18 MMOL/L (ref 22–30)
GLUCOSE SERPL-MCNC: 97 MG/DL (ref 75–110)
POTASSIUM SERPL-SCNC: 4.5 MMOL/L (ref 3.6–5)

## 2020-03-04 RX ADMIN — SUCRALFATE SCH GM: 1 TABLET ORAL at 16:54

## 2020-03-04 RX ADMIN — HEPARIN SODIUM SCH UNIT: 5000 INJECTION, SOLUTION INTRAVENOUS; SUBCUTANEOUS at 05:04

## 2020-03-04 RX ADMIN — DIPHENOXYLATE HYDROCHLORIDE AND ATROPINE SULFATE SCH: 2.5; .025 TABLET ORAL at 18:07

## 2020-03-04 RX ADMIN — OXYBUTYNIN CHLORIDE SCH MG: 5 TABLET ORAL at 10:38

## 2020-03-04 RX ADMIN — LEVOTHYROXINE SODIUM SCH MG: 100 TABLET ORAL at 05:05

## 2020-03-04 RX ADMIN — SERTRALINE HYDROCHLORIDE SCH MG: 50 TABLET ORAL at 10:36

## 2020-03-04 RX ADMIN — ALUMINUM HYDROXIDE, MAGNESIUM HYDROXIDE, AND SIMETHICONE PRN ML: 200; 200; 20 SUSPENSION ORAL at 07:07

## 2020-03-04 RX ADMIN — ACETAMINOPHEN PRN MG: 325 TABLET ORAL at 04:43

## 2020-03-04 RX ADMIN — OXYBUTYNIN CHLORIDE SCH: 5 TABLET ORAL at 18:07

## 2020-03-04 RX ADMIN — PANTOPRAZOLE SODIUM SCH MG: 40 TABLET, DELAYED RELEASE ORAL at 16:51

## 2020-03-04 RX ADMIN — RAMIPRIL SCH MG: 5 CAPSULE ORAL at 10:37

## 2020-03-04 RX ADMIN — MAGNESIUM OXIDE TAB 400 MG (241.3 MG ELEMENTAL MG) SCH MG: 400 (241.3 MG) TAB at 17:07

## 2020-03-04 RX ADMIN — MAGNESIUM OXIDE TAB 400 MG (241.3 MG ELEMENTAL MG) SCH MG: 400 (241.3 MG) TAB at 10:36

## 2020-03-04 RX ADMIN — SUCRALFATE SCH GM: 1 TABLET ORAL at 10:37

## 2020-03-04 RX ADMIN — ACETAMINOPHEN PRN MG: 325 TABLET ORAL at 16:54

## 2020-03-04 RX ADMIN — IMIPENEM AND CILASTATIN SODIUM SCH MLS/HR: 500; 500 INJECTION, POWDER, FOR SOLUTION INTRAVENOUS at 12:25

## 2020-03-04 RX ADMIN — ASPIRIN SCH MG: 81 TABLET, COATED ORAL at 10:36

## 2020-03-04 RX ADMIN — DIPHENOXYLATE HYDROCHLORIDE AND ATROPINE SULFATE SCH TAB: 2.5; .025 TABLET ORAL at 16:51

## 2020-03-04 RX ADMIN — BUSPIRONE HYDROCHLORIDE SCH: 10 TABLET ORAL at 18:07

## 2020-03-04 RX ADMIN — PANTOPRAZOLE SODIUM SCH MG: 40 TABLET, DELAYED RELEASE ORAL at 05:05

## 2020-03-04 RX ADMIN — OXYBUTYNIN CHLORIDE SCH MG: 5 TABLET ORAL at 16:52

## 2020-03-04 RX ADMIN — BUSPIRONE HYDROCHLORIDE SCH MG: 10 TABLET ORAL at 10:36

## 2020-03-04 RX ADMIN — IMIPENEM AND CILASTATIN SODIUM SCH MLS/HR: 500; 500 INJECTION, POWDER, FOR SOLUTION INTRAVENOUS at 17:09

## 2020-03-04 RX ADMIN — LEVOTHYROXINE SODIUM SCH MG: 25 TABLET ORAL at 05:05

## 2020-03-04 RX ADMIN — SUCRALFATE SCH GM: 1 TABLET ORAL at 12:25

## 2020-03-04 RX ADMIN — IMIPENEM AND CILASTATIN SODIUM SCH MLS/HR: 500; 500 INJECTION, POWDER, FOR SOLUTION INTRAVENOUS at 05:05

## 2020-03-04 RX ADMIN — ATENOLOL SCH MG: 50 TABLET ORAL at 10:39

## 2020-03-04 RX ADMIN — BUSPIRONE HYDROCHLORIDE SCH MG: 10 TABLET ORAL at 16:52

## 2020-03-04 RX ADMIN — HEPARIN SODIUM SCH: 5000 INJECTION, SOLUTION INTRAVENOUS; SUBCUTANEOUS at 14:00

## 2020-03-04 NOTE — PDOC TRANSFER SUMMARY
Impression





- Admit/DC Date/PCP


Admission Date/Primary Care Provider: 


  02/28/20 00:10





  JEFF CHRIS MD





Discharge Date: 03/04/20





- Discharge Diagnosis


(1) Cellulitis of colostomy


Is this a current diagnosis for this admission?: Yes   





(2) Acute kidney injury


Is this a current diagnosis for this admission?: Yes   





(3) Urinary tract infection


Is this a current diagnosis for this admission?: Yes   





(4) Increased anion gap metabolic acidosis


Is this a current diagnosis for this admission?: Yes   





(5) Hypothyroidism


Is this a current diagnosis for this admission?: Yes   





(6) Hypomagnesemia


Is this a current diagnosis for this admission?: Yes   





(7) Hyperlipidemia


Is this a current diagnosis for this admission?: Yes   





(8) Generalized anxiety disorder


Is this a current diagnosis for this admission?: Yes   





(9) Gastroesophageal reflux disease


Is this a current diagnosis for this admission?: Yes   





(10) Depression


Is this a current diagnosis for this admission?: Yes   





(11) Sepsis


Is this a current diagnosis for this admission?: Yes   





- Additional Information


Resuscitation Status: Full Code


Referrals: 


[facility] [Provider Group] - 03/12/20 10:15 am


REMA DUONG MD [ACTIVE STAFF] - 03/10/20 10:15 am


(NOT A SURGICAL APPT.


)


Home Medications: 








Aspirin [Adult Low Dose Aspirin EC] 81 mg PO DAILY #0 03/09/12 


Levothyroxine Sodium 125 mcg PO QAM 03/09/12 


Oxybutynin Chloride [Ditropan 5 mg Tablet] 5 mg PO TID #0 03/09/12 


Ramipril [Altace 5 mg Capsule] 5 mg PO DAILY #0 03/09/12 


Pravastatin Sodium [Pravachol] 20 mg PO QHS 12/28/16 


Albuterol Sulfate [Ventolin 0.042% Neb 1.25 mg/3 mL Ampul] 1 vial NEB QIDP PRN 

07/09/18 


Buspirone HCl [Buspar 15 mg Tablet] 15 mg PO TID 07/09/18 


Omeprazole 20 mg PO DAILY 07/09/18 


Sertraline HCl [Zoloft] 150 mg PO DAILY 09/20/19 


Atenolol [Tenormin] 25 mg PO QAM 01/23/20 


Hydroxyzine Pamoate [Vistaril 25 mg Capsule] 25 mg PO Q8HP PRN 02/28/20 


Magnesium Oxide [Mag-Ox 400 mg Tablet] 800 mg PO BID  tablet 03/04/20 


Sucralfate [Carafate 1 gm Tablet] 1 gm PO ACHS  tablet 03/04/20 











History of Present Illiness


History of Present Illness: 


SERGEI BAKER is a 64 year old female with a past medical history of 

diabetes, severe GERD and colorectal cancer status post hemicolectomy with 

ostomy January 2020 on chemo radiation under the care of Dr. Huizar.  She 

complains of uncontrolled uncontrolled acid reflux, poor appetite and severe 

skin irritation about the site of her ostomy.  In the emergency department she 

is found to have acute renal failure, metabolic acidosis, hypomagnesemia and 

urinary tract infection.  She is placed on empiric antibiotics and referred to 

the hospitalist for admission.





Hospital Course


Hospital Course: 


(1) Cellulitis/Skin irritation of colostomy


Presented with extensive erythema/skin irritation over her entire anterior 

abdomen wall and bilateral inguinal folds.  


Significant improvement of abdominal wall erythema/skin irritation since 

admission, except for lateral aspect of the abdomen which unfortunately is still

irritated due to continuous ostomy leak on that side. 





Initial impression on admission was abdominal wall cellulitis and she was 

started on empiric IV imipenem and surgery consulted.


Surgery recommendation was placement of ostomy paste around ileostomy prior to 

placement of appliances and continued ostomy care. 





Unfortunately patient is not able to take care of ostomy on her own therefore 

she needs to be transferred to skilled nurse facility where she receives close 

monitoring of ostomy and continuous ostomy care until she is ready for 

reanastomosis.  





Patient was admitted she was supposed to get surgery for reanastomosis of her 

colectomy however when I contacted our surgical team I was told that patient is 

not ready for reanastomosis and needs to follow-up at the surgical clinic in 3 

to 6 weeks.  Their recommendation is close monitoring and continuous ostomy care

until she is ready for reanastomosis.





Patient does not seem to have any cellulitis of abdominal wall but unfortunately

she does have skin irritation on the lateral side from continuous ostomy 

leakage.  If patient develops any signs of cellulitis, or any infection patient 

is to be sent back to ED for further evaluation.





(2) Acute kidney injury


Resolved. Euvolemic.  Electrolytes WNL.  


Likely prerenal azotemia due to underlying sepsis.


Was a started on cautious IV hydration guided by volume status and vitals.


Avoid nephrotoxic meds.  Outpatient PCP and nephrology follow-up.  





(3) Urinary tract infection


Due to Klebsiella sensitive to quinolones and carbapenems. 


Received a complete course of IV antibiotics.





(4) Increased anion gap metabolic acidosis


Resolved.  Anion gap WNL.  Lactic acid WNL.  


Multifactorial, acute renal failure, starvation, diabetes. 





(5) Hypothyroidism


Restart home meds.  Outpatient PCP follow-up.





(6) Hypomagnesemia


Replete. Likely due to GI losses and low p.o. intake.


Continue daily supplementation.  


Monitor magnesium level.  PCP follow-up.





(7) Hyperlipidemia


Restarted on home meds. Outpatient PCP follow-up.





(8) Generalized anxiety disorder


Takes BuSpar at home. 


Continue BuSpar as outpatient.  





(9) Gastroesophageal reflux disease


Chronic severe GERD.


Was a started on PPIs and sucralfate with good result.


Continue PPIs and sucralfate.  Outpatient PCP and gastroenterology follow-up.





(10) Depression


Denies any suicidal or homicidal ideation.


Takes SSRI at home. 


Restarted on home meds.  Outpatient PCP and psychiatry follow-up. 





(11) Sepsis


Resolved.


Likely due to underlying UTI.  Urine culture positive for Klebsiella.  Cultures 

no growth.


Received 6 days of empiric IV imipenem.





Physical Exam


Vital Signs: 


                                        











Temp Pulse Resp BP Pulse Ox


 


 97.7 F   59 L  19   135/69 H  100 


 


 03/04/20 11:30  03/04/20 11:30  03/04/20 11:30  03/04/20 11:30  03/04/20 11:30








                                 Intake & Output











 03/03/20 03/04/20 03/05/20





 06:59 06:59 06:59


 


Intake Total 730 2517 120


 


Output Total  800 250


 


Balance 730 1717 -130


 


Weight 71.2 kg 70.5 kg 











General appearance: PRESENT: no acute distress, well-developed, well-nourished


Head exam: PRESENT: atraumatic, normocephalic


Neck exam: ABSENT: carotid bruit, JVD, lymphadenopathy, thyromegaly


Respiratory exam: PRESENT: clear to auscultation susan.  ABSENT: rales, rhonchi, 

wheezes


Cardiovascular exam: PRESENT: RRR.  ABSENT: diastolic murmur, rubs, systolic 

murmur


GI/Abdominal exam: PRESENT: normal bowel sounds, soft, other - Ostomy is patent,

skin erythema and irritation the lateral aspect of the abdomen from ostomy 

leakage.  No sign of infection..  ABSENT: distended, guarding, mass, 

organolmegaly, rebound, tenderness


Neurological exam: PRESENT: alert, awake, oriented to person, oriented to place,

oriented to time, oriented to situation, CN II-XII grossly intact.  ABSENT: 

motor sensory deficit


Skin exam: PRESENT: dry, intact, warm.  ABSENT: cyanosis, rash





Results


Laboratory Results: 


                                        











WBC  7.0 10^3/uL (4.0-10.5)   03/01/20  05:59    


 


RBC  3.50 10^6/uL (3.72-5.28)  L  03/01/20  05:59    


 


Hgb  10.5 g/dL (12.0-15.5)  L  03/01/20  05:59    


 


Hct  31.8 % (36.0-47.0)  L  03/01/20  05:59    


 


MCV  91 fl (80-97)   03/01/20  05:59    


 


MCH  30.0 pg (27.0-33.4)   03/01/20  05:59    


 


MCHC  33.0 g/dL (32.0-36.0)   03/01/20  05:59    


 


RDW  16.2 % (11.5-14.0)  H  03/01/20  05:59    


 


Plt Count  399 10^3/uL (150-450)   03/01/20  05:59    


 


Lymph % (Auto)  Not Reportable   02/29/20  04:55    


 


Mono % (Auto)  Not Reportable   02/29/20  04:55    


 


Eos % (Auto)  Not Reportable   02/29/20  04:55    


 


Baso % (Auto)  Not Reportable   02/29/20  04:55    


 


Absolute Neuts (auto)  Not Reportable   02/29/20  04:55    


 


Absolute Lymphs (auto)  Not Reportable   02/29/20  04:55    


 


Absolute Monos (auto)  Not Reportable   02/29/20  04:55    


 


Absolute Eos (auto)  Not Reportable   02/29/20  04:55    


 


Absolute Basos (auto)  Not Reportable   02/29/20  04:55    


 


Total Counted  100   02/29/20  04:55    


 


Seg Neutrophils %  Not Reportable   02/29/20  04:55    


 


Seg Neuts % (Manual)  86 % (42-78)  H  02/29/20  04:55    


 


Band Neutrophils %  3 % (3-5)   02/29/20  04:55    


 


Lymphocytes % (Manual)  6 % (13-45)  L  02/29/20  04:55    


 


Monocytes % (Manual)  4 % (3-13)   02/29/20  04:55    


 


Eosinophils % (Manual)  1 % (0-6)   02/29/20  04:55    


 


Basophils % (Manual)  0 % (0-2)   02/29/20  04:55    


 


Metamyelocytes %  1 % (0-1)   02/27/20  16:24    


 


Abs Neuts (Manual)  7.6 10^3/uL (1.7-8.2)   02/29/20  04:55    


 


Abs Lymphs (Manual)  0.5 10^3/uL (0.5-4.7)   02/29/20  04:55    


 


Abs Monocytes (Manual)  0.3 10^3/uL (0.1-1.4)   02/29/20  04:55    


 


Absolute Eos (Manual)  0.1 10^3/uL (0.0-0.6)   02/29/20  04:55    


 


Abs Basophils (Manual)  0.0 10^3/uL (0.0-0.2)   02/29/20  04:55    


 


Toxic Granulation  SLIGHT   02/28/20  05:11    


 


Clumped Platelets  PRESENT   02/27/20  16:24    


 


Platelet Comment  ADEQUATE   02/29/20  04:55    


 


Poikilocytosis  SLIGHT   02/28/20  05:11    


 


Anisocytosis  2+   02/29/20  04:55    


 


Tear Drop Cells  SLIGHT   02/28/20  05:11    


 


Ovalocytes  SLIGHT   02/28/20  05:11    


 


Sodium  138.6 mmol/L (137-145)   03/04/20  11:47    


 


Potassium  4.5 mmol/L (3.6-5.0)   03/04/20  11:47    


 


Chloride  110 mmol/L ()  H  03/04/20  11:47    


 


Carbon Dioxide  18 mmol/L (22-30)  L  03/04/20  11:47    


 


Anion Gap  11  (5-19)   03/04/20  11:47    


 


BUN  13 mg/dL (7-20)   03/04/20  11:47    


 


Creatinine  0.80 mg/dL (0.52-1.25)   03/04/20  11:47    


 


Est GFR ( Amer)  > 60  (>60)   03/04/20  11:47    


 


Est GFR (MDRD) Non-Af  > 60  (>60)   03/04/20  11:47    


 


Glucose  97 mg/dL ()   03/04/20  11:47    


 


POC Glucose  98 mg/dL ()   03/03/20  16:00    


 


Lactic Acid  1.1 mmol/L (0.7-2.1)   02/27/20  22:30    


 


Calcium  9.2 mg/dL (8.4-10.2)   03/04/20  11:47    


 


Phosphorus  5.5 mg/dL (2.5-4.5)  H  02/27/20  16:24    


 


Magnesium  1.9 mg/dL (1.6-2.3)   03/04/20  11:47    


 


Total Bilirubin  0.2 mg/dL (0.2-1.3)   02/29/20  04:55    


 


Direct Bilirubin  0.0 mg/dL (0.0-0.4)   02/29/20  04:55    


 


Neonat Total Bilirubin  Not Reportable   02/29/20  04:55    


 


Neonat Direct Bilirubin  Not Reportable   02/29/20  04:55    


 


Neonat Indirect Bili  Not Reportable   02/29/20  04:55    


 


AST  33 U/L (14-36)   02/29/20  04:55    


 


ALT  21 U/L (<35)   02/29/20  04:55    


 


Alkaline Phosphatase  268 U/L ()  H  02/29/20  04:55    


 


Total Protein  5.7 g/dL (6.3-8.2)  L  02/29/20  04:55    


 


Albumin  2.7 g/dL (3.5-5.0)  L  02/29/20  04:55    


 


Urine Color  YELLOW   02/27/20  20:53    


 


Urine Appearance  TURBID   02/27/20  20:53    


 


Urine pH  5.0  (5.0-9.0)   02/27/20  20:53    


 


Ur Specific Gravity  1.013   02/27/20  20:53    


 


Urine Protein  100 mg/dL (NEGATIVE)  H  02/27/20  20:53    


 


Urine Glucose (UA)  NEGATIVE mg/dL (NEGATIVE)   02/27/20  20:53    


 


Urine Ketones  NEGATIVE mg/dL (NEGATIVE)   02/27/20  20:53    


 


Urine Blood  MODERATE  (NEGATIVE)  H  02/27/20  20:53    


 


Urine Nitrite  NEGATIVE  (NEGATIVE)   02/27/20  20:53    


 


Urine Bilirubin  NEGATIVE  (NEGATIVE)   02/27/20  20:53    


 


Urine Urobilinogen  NEGATIVE mg/dL (<2.0)   02/27/20  20:53    


 


Ur Leukocyte Esterase  LARGE  (NEGATIVE)  H  02/27/20  20:53    


 


Urine WBC (Auto)  >182 /HPF  02/27/20  20:53    


 


Urine RBC (Auto)  45 /HPF  02/27/20  20:53    


 


Urine Bacteria (Auto)  3+ /HPF  02/27/20  20:53    


 


Urine WBC Clumps  MANY /HPF  02/27/20  20:53    


 


Squamous Epi Cells Auto  19 /HPF  02/27/20  20:53    


 


Urine Mucus (Auto)  MOD /LPF  02/27/20  20:53    


 


Urine Ascorbic Acid  NEGATIVE  (NEGATIVE)   02/27/20  20:53    














Stroke


Is this a Stroke Patient?: No





Acute Heart Failure





- **


Is this a Heart Failure Patient?: No

## 2020-03-14 ENCOUNTER — HOSPITAL ENCOUNTER (INPATIENT)
Dept: HOSPITAL 62 - ER | Age: 65
LOS: 1 days | Discharge: ANOTHER HOSPITAL | DRG: 871 | End: 2020-03-15
Attending: INTERNAL MEDICINE | Admitting: INTERNAL MEDICINE
Payer: MEDICAID

## 2020-03-14 DIAGNOSIS — Z93.3: ICD-10-CM

## 2020-03-14 DIAGNOSIS — J44.9: ICD-10-CM

## 2020-03-14 DIAGNOSIS — Z66: ICD-10-CM

## 2020-03-14 DIAGNOSIS — E87.2: ICD-10-CM

## 2020-03-14 DIAGNOSIS — E87.5: ICD-10-CM

## 2020-03-14 DIAGNOSIS — I12.9: ICD-10-CM

## 2020-03-14 DIAGNOSIS — N39.0: ICD-10-CM

## 2020-03-14 DIAGNOSIS — R65.21: ICD-10-CM

## 2020-03-14 DIAGNOSIS — I25.2: ICD-10-CM

## 2020-03-14 DIAGNOSIS — M19.90: ICD-10-CM

## 2020-03-14 DIAGNOSIS — E11.65: ICD-10-CM

## 2020-03-14 DIAGNOSIS — Z85.038: ICD-10-CM

## 2020-03-14 DIAGNOSIS — K21.9: ICD-10-CM

## 2020-03-14 DIAGNOSIS — Z79.899: ICD-10-CM

## 2020-03-14 DIAGNOSIS — Z79.51: ICD-10-CM

## 2020-03-14 DIAGNOSIS — E05.90: ICD-10-CM

## 2020-03-14 DIAGNOSIS — N17.9: ICD-10-CM

## 2020-03-14 DIAGNOSIS — A41.9: Primary | ICD-10-CM

## 2020-03-14 DIAGNOSIS — N18.9: ICD-10-CM

## 2020-03-14 DIAGNOSIS — Z98.84: ICD-10-CM

## 2020-03-14 DIAGNOSIS — Z79.82: ICD-10-CM

## 2020-03-14 DIAGNOSIS — E11.22: ICD-10-CM

## 2020-03-14 DIAGNOSIS — R68.0: ICD-10-CM

## 2020-03-14 DIAGNOSIS — K92.2: ICD-10-CM

## 2020-03-14 LAB
ABSOLUTE LYMPHOCYTES# (MANUAL): 0.5 10^3/UL (ref 0.5–4.7)
ABSOLUTE MONOCYTES # (MANUAL): 1 10^3/UL (ref 0.1–1.4)
ADD MANUAL DIFF: YES
ALBUMIN SERPL-MCNC: 4.8 G/DL (ref 3.5–5)
ALP SERPL-CCNC: 257 U/L (ref 38–126)
ANION GAP SERPL CALC-SCNC: 28 MMOL/L (ref 5–19)
ANISOCYTOSIS BLD QL SMEAR: (no result)
APPEARANCE UR: (no result)
APTT PPP: (no result) S
AST SERPL-CCNC: 23 U/L (ref 14–36)
BASE EXCESS BLDV CALC-SCNC: -15.3 MMOL/L
BASOPHILS NFR BLD MANUAL: 0 % (ref 0–2)
BILIRUB DIRECT SERPL-MCNC: 0.7 MG/DL (ref 0–0.4)
BILIRUB SERPL-MCNC: 0.7 MG/DL (ref 0.2–1.3)
BILIRUB UR QL STRIP: NEGATIVE
BUN SERPL-MCNC: 136 MG/DL (ref 7–20)
CALCIUM: 10.9 MG/DL (ref 8.4–10.2)
CHLORIDE SERPL-SCNC: 91 MMOL/L (ref 98–107)
CK SERPL-CCNC: 70 U/L (ref 30–135)
CO2 SERPL-SCNC: 10 MMOL/L (ref 22–30)
EOSINOPHIL NFR BLD MANUAL: 0 % (ref 0–6)
ERYTHROCYTE [DISTWIDTH] IN BLOOD BY AUTOMATED COUNT: 16.4 % (ref 11.5–14)
GLUCOSE SERPL-MCNC: 123 MG/DL (ref 75–110)
GLUCOSE UR STRIP-MCNC: NEGATIVE MG/DL
HCO3 BLDV-SCNC: 12.4 MMOL/L (ref 20–32)
HCT VFR BLD CALC: 38.5 % (ref 36–47)
HGB BLD-MCNC: 13.1 G/DL (ref 12–15.5)
INR PPP: 1.49
KETONES UR STRIP-MCNC: (no result) MG/DL
MCH RBC QN AUTO: 30.4 PG (ref 27–33.4)
MCHC RBC AUTO-ENTMCNC: 34.1 G/DL (ref 32–36)
MCV RBC AUTO: 89 FL (ref 80–97)
MONOCYTES % (MANUAL): 6 % (ref 3–13)
NITRITE UR QL STRIP: NEGATIVE
NT PRO BNP: 2600 PG/ML (ref ?–125)
PCO2 BLDV: 35.5 MMHG (ref 35–63)
PH BLDV: 7.16 [PH] (ref 7.3–7.42)
PH UR STRIP: 5 [PH] (ref 5–9)
PLATELET # BLD: 502 10^3/UL (ref 150–450)
PLATELET CLUMP BLD QL SMEAR: PRESENT
PLATELET COMMENT: ADEQUATE
POTASSIUM SERPL-SCNC: 8.1 MMOL/L (ref 3.6–5)
PROT SERPL-MCNC: 8.5 G/DL (ref 6.3–8.2)
PROT UR STRIP-MCNC: 100 MG/DL
PROTHROMBIN TIME: 18.1 SEC (ref 11.4–15.4)
RBC # BLD AUTO: 4.32 10^6/UL (ref 3.72–5.28)
SEGMENTED NEUTROPHILS % (MAN): 91 % (ref 42–78)
SP GR UR STRIP: 1.02
TOTAL CELLS COUNTED BLD: 100
TROPONIN I SERPL-MCNC: 0.06 NG/ML
UROBILINOGEN UR-MCNC: NEGATIVE MG/DL (ref ?–2)
VARIANT LYMPHS NFR BLD MANUAL: 3 % (ref 13–45)
WBC # BLD AUTO: 16.6 10^3/UL (ref 4–10.5)

## 2020-03-14 PROCEDURE — 71045 X-RAY EXAM CHEST 1 VIEW: CPT

## 2020-03-14 PROCEDURE — 80053 COMPREHEN METABOLIC PANEL: CPT

## 2020-03-14 PROCEDURE — 84133 ASSAY OF URINE POTASSIUM: CPT

## 2020-03-14 PROCEDURE — 84300 ASSAY OF URINE SODIUM: CPT

## 2020-03-14 PROCEDURE — 87086 URINE CULTURE/COLONY COUNT: CPT

## 2020-03-14 PROCEDURE — 87186 SC STD MICRODIL/AGAR DIL: CPT

## 2020-03-14 PROCEDURE — 85025 COMPLETE CBC W/AUTO DIFF WBC: CPT

## 2020-03-14 PROCEDURE — 82271 OCCULT BLOOD OTHER SOURCES: CPT

## 2020-03-14 PROCEDURE — 86850 RBC ANTIBODY SCREEN: CPT

## 2020-03-14 PROCEDURE — 74018 RADEX ABDOMEN 1 VIEW: CPT

## 2020-03-14 PROCEDURE — 82803 BLOOD GASES ANY COMBINATION: CPT

## 2020-03-14 PROCEDURE — 82550 ASSAY OF CK (CPK): CPT

## 2020-03-14 PROCEDURE — 84156 ASSAY OF PROTEIN URINE: CPT

## 2020-03-14 PROCEDURE — 87070 CULTURE OTHR SPECIMN AEROBIC: CPT

## 2020-03-14 PROCEDURE — 85610 PROTHROMBIN TIME: CPT

## 2020-03-14 PROCEDURE — 94640 AIRWAY INHALATION TREATMENT: CPT

## 2020-03-14 PROCEDURE — 99292 CRITICAL CARE ADDL 30 MIN: CPT

## 2020-03-14 PROCEDURE — 96374 THER/PROPH/DIAG INJ IV PUSH: CPT

## 2020-03-14 PROCEDURE — 81001 URINALYSIS AUTO W/SCOPE: CPT

## 2020-03-14 PROCEDURE — 82140 ASSAY OF AMMONIA: CPT

## 2020-03-14 PROCEDURE — P9041 ALBUMIN (HUMAN),5%, 50ML: HCPCS

## 2020-03-14 PROCEDURE — 82962 GLUCOSE BLOOD TEST: CPT

## 2020-03-14 PROCEDURE — 86900 BLOOD TYPING SEROLOGIC ABO: CPT

## 2020-03-14 PROCEDURE — 96361 HYDRATE IV INFUSION ADD-ON: CPT

## 2020-03-14 PROCEDURE — 96375 TX/PRO/DX INJ NEW DRUG ADDON: CPT

## 2020-03-14 PROCEDURE — 83880 ASSAY OF NATRIURETIC PEPTIDE: CPT

## 2020-03-14 PROCEDURE — 76770 US EXAM ABDO BACK WALL COMP: CPT

## 2020-03-14 PROCEDURE — 83605 ASSAY OF LACTIC ACID: CPT

## 2020-03-14 PROCEDURE — 87040 BLOOD CULTURE FOR BACTERIA: CPT

## 2020-03-14 PROCEDURE — 87088 URINE BACTERIA CULTURE: CPT

## 2020-03-14 PROCEDURE — 84100 ASSAY OF PHOSPHORUS: CPT

## 2020-03-14 PROCEDURE — C9113 INJ PANTOPRAZOLE SODIUM, VIA: HCPCS

## 2020-03-14 PROCEDURE — 80307 DRUG TEST PRSMV CHEM ANLYZR: CPT

## 2020-03-14 PROCEDURE — 93005 ELECTROCARDIOGRAM TRACING: CPT

## 2020-03-14 PROCEDURE — 93010 ELECTROCARDIOGRAM REPORT: CPT

## 2020-03-14 PROCEDURE — 36415 COLL VENOUS BLD VENIPUNCTURE: CPT

## 2020-03-14 PROCEDURE — 82570 ASSAY OF URINE CREATININE: CPT

## 2020-03-14 PROCEDURE — 86901 BLOOD TYPING SEROLOGIC RH(D): CPT

## 2020-03-14 PROCEDURE — 86920 COMPATIBILITY TEST SPIN: CPT

## 2020-03-14 PROCEDURE — 99291 CRITICAL CARE FIRST HOUR: CPT

## 2020-03-14 PROCEDURE — 74176 CT ABD & PELVIS W/O CONTRAST: CPT

## 2020-03-14 PROCEDURE — 83735 ASSAY OF MAGNESIUM: CPT

## 2020-03-14 PROCEDURE — 84484 ASSAY OF TROPONIN QUANT: CPT

## 2020-03-14 RX ADMIN — MORPHINE SULFATE PRN MG: 10 INJECTION INTRAMUSCULAR; INTRAVENOUS; SUBCUTANEOUS at 23:21

## 2020-03-14 RX ADMIN — PANTOPRAZOLE SODIUM PRN MG: 40 INJECTION, POWDER, FOR SOLUTION INTRAVENOUS at 17:50

## 2020-03-14 NOTE — RADIOLOGY REPORT (SQ)
EXAM DESCRIPTION:  KUB/ABDOMEN (SINGLE VIEW)



COMPLETED DATE/TIME:  3/14/2020 7:13 pm



REASON FOR STUDY:  gi bleed



COMPARISON:  1/17/2020



NUMBER OF VIEWS:  One view.



TECHNIQUE:   Supine radiographic image of the abdomen acquired.



LIMITATIONS:  None.



FINDINGS:  BOWEL GAS PATTERN: Normal bowel gas pattern. No dilated loops.

CALCIFICATIONS: Staghorn calculus in the right kidney.

SOFT TISSUES: No gross mass or suggestion of organomegaly.

HARDWARE: None in the abdomen.

BONES: No acute fracture. No worrisome bone lesions.

OTHER: No other significant finding.



IMPRESSION:  Right staghorn calculus.  No acute finding in the abdomen.



TECHNICAL DOCUMENTATION:  JOB ID:  [id number]

 2011 [employer]- All Rights Reserved



Reading location - IP/workstation name: [email or web address]

## 2020-03-14 NOTE — ER DOCUMENT REPORT
ED General





- General


Chief Complaint: Altered Mental Status


Stated Complaint: ALTERED MENTAL STATUS


Time Seen by Provider: 20 17:12


Mode of Arrival: Medic


Information source: Patient, Transfer Record, [facility]


Notes: 





Patient is a 64-year-old female presenting to the emergency department from a 

local nursing home chief complaint of 3 days of chest and abdomen pain and 

altered mental status.  Per nursing home the patient was just noticed to have 

black discharge in her ostomy bag.  At time of presentation patient looks older 

than stated age, is moderately discomforted because of the pain but does answer 

basic questions appropriately.  Patient denies any change in medications.  Jakob young denies slip or falls.


TRAVEL OUTSIDE OF THE [place] IN LAST 30 DAYS: No





- HPI


Onset: This morning


Onset/Duration: Constant


Quality of pain: Pressure, Throbbing


Severity: Moderate


Pain Level: 2


Associated symptoms: Nausea, Vomiting, Slow to respond, Weakness


Exacerbated by: Denies


Relieved by: Denies


Similar symptoms previously: No


Recently seen / treated by doctor: No





- Related Data


Allergies/Adverse Reactions: 


                                        





shellfish derived Allergy (Severe, Verified 20 02:17)


   Hives


iodine [Iodine] Allergy (Intermediate, Verified 20 02:17)


   Rash


Sulfa (Sulfonamide Antibiotics) Allergy (Intermediate, Verified 20 02:17)


   Hives


Iodinated Contrast Media Adverse Reaction (Intermediate, Verified 20 

02:17)


   Nausea











Past Medical History





- General


Information source: Patient, Friend, Transfer Record, [facility]





- Social History


Smoking Status: Unknown if Ever Smoked


Chew tobacco use (# tins/day): No


Frequency of alcohol use: None


Drug Abuse: None


Lives with: Nursing Home


Family History: CAD, DM, Hyperlipidemia, Hypertension


Patient has suicidal ideation: No


Patient has homicidal ideation: No





- Past Medical History


Cardiac Medical History: Reports: Hx Heart Attack - unsure-saw cardiologist 

 in [place],  Hypertension


   Denies: Hx Atrial Fibrillation, Hx Congestive Heart Failure, Hx Coronary 

Artery Disease, Hx Hypercholesterolemia, Hx Peripheral Vascular Disease, Hx 

Pulmonary Embolism, Hx Heart Murmur


Pulmonary Medical History: Reports: Hx Asthma, Hx Bronchitis, Hx COPD, Hx 

Pneumonia - 


   Denies: Hx Respiratory Failure, Hx Sleep Apnea, Hx Tuberculosis


Neurological Medical History: Denies: Hx Cerebrovascular Accident, Hx Seizures, 

Hx Parkinson's Disease


Endocrine Medical History: Reports: Hx Hyperthyroidism - liquid radiation.  D

enies: Hx Hypothyroidism


Malignancy Medical History: Reports: Hx Colorectal Cancer.  Denies: Hx Leukemia,

 Hx Lung Cancer


GI Medical History: Reports: Hx Gastroesophageal Reflux Disease.  Denies: Hx 

Crohn's Disease, Hx Hepatitis, Hx Hiatal Hernia, Hx Irritable Bowel, Hx Liver 

Failure, Hx Pancreatitis, Hx Ulcer


Musculoskeletal Medical History: Reports Hx Arthritis, Denies Hx Fibromyalgia, 

Denies Hx Multiple Sclerosis, Denies Hx Muscular Dystrophy, Denies Hx Systemic 

Lupus Erythematosus


Psychiatric Medical History: Reports: Hx Depression


   Denies: Hx Bipolar Disorder, Hx Dementia, Hx Post Traumatic Stress Disorder, 

Hx Schizophrenia


Traumatic Medical History: Denies: Hx Fractures


Infectious Medical History: Denies: Hx Hepatitis, Hx HIV


Past Surgical History: Reports: Hx Abdominal Surgery - COLOSTOMY, Hx Tubal 

Ligation, Other - Tumor removal of the throat which was benign.  Denies: Hx 

Appendectomy, Hx Bowel Surgery, Hx  Section, Hx Cholecystectomy, Hx 

Colostomy, Hx Coronary Artery Bypass Graft, Hx Gastric Bypass Surgery, Hx 

Herniorrhaphy, Hx Hysterectomy, Hx Mastectomy, Hx Open Heart Surgery, Hx 

Pacemaker, Hx Tonsillectomy





- Immunizations


Hx Diphtheria, Pertussis, Tetanus Vaccination: Yes - not sure





Review of Systems





- Review of Systems


Constitutional: See HPI


EENT: No symptoms reported


Cardiovascular: See HPI, Chest pain


Respiratory: Short of breath


Gastrointestinal: See HPI, Abdominal pain, Diarrhea, Nausea, Vomiting, Poor 

appetite, Poor fluid intake, Blood in vomit, Black stools


Genitourinary: No symptoms reported


Musculoskeletal: No symptoms reported


Skin: No symptoms reported


Neurological/Psychological: No symptoms reported


-: Yes All other systems reviewed and negative





Physical Exam





- Vital signs


Vitals: 


                                        











Resp


 


 16 


 


 20 17:27














- Notes


Notes: 





PHYSICAL EXAMINATION:


 


GENERAL: Patient is a 64-year-old female presenting to the emergency department 

chief complaint of generalized weakness and black fluid to her colostomy bag.  

Patient appears older than stated age, is pallorous, has markedly dry mucous 

membranes with dried blood in the oral pharynx.


 


HEAD: Atraumatic, normocephalic.


 


EYES: Pupils equal round and reactive to light, extraocular movements intact, 

sclera anicteric, conjunctiva are pallorous.


 


ENT: nares patent, oropharynx without exudates.  Dry mucous membranes.


 


NECK: Normal range of motion, supple without lymphadenopathy, no appreciable JVD


 


LUNGS: Lungs clear to auscultation bilaterally and equal.  No wheezes rales or 

rhonchi.  However poor excursion. 


 


HEART: Regular rate and rhythm without murmurs


 


ABDOMEN: Soft, diffusely tender, decreased bowel sounds.  Positive guarding, no 

rebound.  No masses appreciated.


 


EXTREMITIES: Active full range of motion, no pitting or edema.  No cyanosis. 2+ 

pulses x4


 


NEUROLOGICAL: No focal neurological deficits. Moves all extremities 

spontaneously and on command.


 


SKIN: Warm, Dry, pallorous decreased skin turgor





Course





- Re-evaluation


Re-evalutation: 





20 17:20


At time of presentation patient is brought to hospital room access to port in 

her chest is obtained.  EKG was obtained portable chest x-ray/KUB will be 

obtained.  Laboratory studies will be obtained.  Patient's hypotensive at this 

time and will receive a liter normal saline IV bolus patient will receive 

Protonix per GI bleed protocol.  Patient will also receive Zofran 4 mg IV 

patient has been typed and screened for potential transfusion.


03/15/20 01:13


While in the emergency department the patient was maintained on a cardiac 

monitor.  Patient did receive medications for management of the GI bleed 

Protonix per protocol.  Patient received medications per protocol for 

hyperkalemia.  Patient did receive 2 L of IV normal saline for management of 

hypotension.  Because of the patient's hypothermia bear hugger blanket was 

placed as well as the use of fluid warmer. 





After the patient had been here some time we were given notification from 

nursing home that the patient has a DO NOT RESUSCITATE order.  Friends of the 

patient who are her point of contact through the nursing home did present to the

 emergency department and we did discuss the patient's condition.  At this point

 in time I do not feel the patient is stable enough for transfer to any facility

 to address the GI bleed.  I have consulted with the hospitalist for admission 

for comfort care measures and stabilization.





03/15/20 01:16


Clinical impression #1 GI bleed #2 hypothermia #3 hypotension #4 leukocytosis #5

 acidosis #6 acute on chronic renal failure #7 urinary tract infection #8 

hyperkalemia


03/15/20 01:16








- Vital Signs


Vital signs: 


                                        











Temp Pulse Resp BP Pulse Ox


 


 93.9 F L     21 H  82/48 L  100 


 


 20 18:54     20 23:46  20 23:46  20 23:46














- Laboratory


Result Diagrams: 


                                 20 17:10





                                 20 23:25


Laboratory results interpreted by me: 


                                        











  20





  17:10 17:10 17:10


 


WBC  16.6 H  


 


RDW  16.4 H  


 


Plt Count  502 H  


 


Seg Neuts % (Manual)  91 H  


 


Lymphocytes % (Manual)  3 L  


 


Abs Neuts (Manual)  15.1 H  


 


PT   18.1 H 


 


VBG pH   


 


VBG HCO3   


 


Sodium    129.2 L


 


Potassium    8.1 H*


 


Chloride    91 L


 


Carbon Dioxide    10 L*


 


Anion Gap    28 H


 


BUN    136 H


 


Creatinine    12.73 H


 


Est GFR ( Amer)    4 L


 


Est GFR (MDRD) Non-Af    3 L


 


Glucose    123 H


 


Calcium    10.9 H


 


Magnesium   


 


Direct Bilirubin    0.7 H


 


Alkaline Phosphatase    257 H


 


NT-Pro-B Natriuret Pep   


 


Total Protein    8.5 H


 


Urine Protein   


 


Urine Ketones   


 


Urine Blood   


 


Ur Leukocyte Esterase   














  20





  17:10 17:10 17:10


 


WBC   


 


RDW   


 


Plt Count   


 


Seg Neuts % (Manual)   


 


Lymphocytes % (Manual)   


 


Abs Neuts (Manual)   


 


PT   


 


VBG pH  7.16 L*  


 


VBG HCO3  12.4 L  


 


Sodium   


 


Potassium   


 


Chloride   


 


Carbon Dioxide   


 


Anion Gap   


 


BUN   


 


Creatinine   


 


Est GFR ( Amer)   


 


Est GFR (MDRD) Non-Af   


 


Glucose   


 


Calcium   


 


Magnesium   


 


Direct Bilirubin   


 


Alkaline Phosphatase   


 


NT-Pro-B Natriuret Pep   2600 H 


 


Total Protein   


 


Urine Protein    100 H


 


Urine Ketones    TRACE H


 


Urine Blood    MODERATE H


 


Ur Leukocyte Esterase    MODERATE H














  20





  17:10


 


WBC 


 


RDW 


 


Plt Count 


 


Seg Neuts % (Manual) 


 


Lymphocytes % (Manual) 


 


Abs Neuts (Manual) 


 


PT 


 


VBG pH 


 


VBG HCO3 


 


Sodium 


 


Potassium 


 


Chloride 


 


Carbon Dioxide 


 


Anion Gap 


 


BUN 


 


Creatinine 


 


Est GFR ( Amer) 


 


Est GFR (MDRD) Non-Af 


 


Glucose 


 


Calcium 


 


Magnesium  4.4 H*


 


Direct Bilirubin 


 


Alkaline Phosphatase 


 


NT-Pro-B Natriuret Pep 


 


Total Protein 


 


Urine Protein 


 


Urine Ketones 


 


Urine Blood 


 


Ur Leukocyte Esterase 














- EKG Interpretation by Me


EKG shows normal: Sinus rhythm


Rate: Normal


Rhythm: NSR


Additional EKG results interpreted by me: 





20 17:20


EKG is interpreted by me demonstrates sinus rhythm 50 bpm there is signs of LVH 

there is minimal elevation in lead II and aVF there is hyperacute T waves this 

is all new compared to prior EKG of 2020.





Critical Care Note





- Critical Care Note


Total time excluding time spent on procedures (mins): 65


Comments: 





Please allow 65 minutes of critical care time exclusive of separately billable 

procedures for multiple re-evaluations medical management consultation with 

friends of the patient and as well as the hospitalist and management of this 

critically ill patient.





Discharge





- Discharge


Clinical Impression: 


 Acidosis





GI bleed


Qualifiers:


 GI bleed type/associated pathology: unspecified gastrointestinal hemorrhage 

type Qualified Code(s): K92.2 - Gastrointestinal hemorrhage, unspecified





Hypotension


Qualifiers:


 Hypotension type: hypotension due to hypovolemia Qualified Code(s): I95.89 - 

Other hypotension





Hypothermia


Qualifiers:


 Encounter type: initial encounter Qualified Code(s): T68.XXXA - Hypothermia, 

initial encounter





Leukocytosis


Qualifiers:


 Leukocytosis type: unspecified Qualified Code(s): D72.829 - Elevated white 

blood cell count, unspecified





Renal failure (ARF), acute on chronic


Qualifiers:


 Acute renal failure type: unspecified Chronic kidney disease stage: unspecified

 stage Qualified Code(s): N17.9 - Acute kidney failure, unspecified





UTI (urinary tract infection)


Qualifiers:


 Urinary tract infection type: site unspecified Hematuria presence: with 

hematuria Qualified Code(s): N39.0 - Urinary tract infection, site not specified





Condition: Poor


Disposition: ADMITTED AS INPATIENT


Admitting Provider: Timothy (Hospitalist)


Unit Admitted: Telemetry

## 2020-03-14 NOTE — RADIOLOGY REPORT (SQ)
EXAM DESCRIPTION:  CHEST SINGLE VIEW



COMPLETED DATE/TIME:  3/14/2020 7:13 pm



REASON FOR STUDY:  sob



COMPARISON:  1/11/2020



EXAM PARAMETERS:  NUMBER OF VIEWS: One view.

TECHNIQUE: Single frontal radiographic view of the chest acquired.

RADIATION DOSE: NA

LIMITATIONS: None.



FINDINGS:  LUNGS AND PLEURA: No opacities, masses or pneumothorax. No pleural effusion.

MEDIASTINUM AND HILAR STRUCTURES: No masses.  Contour normal.

HEART AND VASCULAR STRUCTURES: Heart normal in size.  Normal vasculature.

BONES: No acute findings.

HARDWARE: None in the chest.

OTHER: No other significant finding.



IMPRESSION:  NO ACUTE RADIOGRAPHIC FINDING IN THE CHEST.



TECHNICAL DOCUMENTATION:  JOB ID:  [id number]

 2011 "[employer]- All Rights Reserved



Reading location - IP/workstation name: [email or web address]

## 2020-03-15 VITALS — DIASTOLIC BLOOD PRESSURE: 54 MMHG | SYSTOLIC BLOOD PRESSURE: 89 MMHG

## 2020-03-15 LAB
ABSOLUTE LYMPHOCYTES# (MANUAL): 0.5 10^3/UL (ref 0.5–4.7)
ABSOLUTE MONOCYTES # (MANUAL): 0.4 10^3/UL (ref 0.1–1.4)
ADD MANUAL DIFF: YES
ALBUMIN SERPL-MCNC: 2.7 G/DL (ref 3.5–5)
ALP SERPL-CCNC: 137 U/L (ref 38–126)
ANION GAP SERPL CALC-SCNC: 14 MMOL/L (ref 5–19)
ANION GAP SERPL CALC-SCNC: 15 MMOL/L (ref 5–19)
ANION GAP SERPL CALC-SCNC: 15 MMOL/L (ref 5–19)
ANION GAP SERPL CALC-SCNC: 23 MMOL/L (ref 5–19)
ANISOCYTOSIS BLD QL SMEAR: (no result)
ARTERIAL BLOOD FIO2: (no result)
ARTERIAL BLOOD H2CO3: 0.89 MMOL/L (ref 1.05–1.35)
ARTERIAL BLOOD HCO3: 10.7 MMOL/L (ref 20–24)
ARTERIAL BLOOD PCO2: 29.7 MMHG (ref 35–45)
ARTERIAL BLOOD PH: 7.17 (ref 7.35–7.45)
ARTERIAL BLOOD PO2: 88.9 MMHG (ref 80–100)
ARTERIAL BLOOD TOTAL CO2: 11.6 MMOL/L (ref 21–25)
AST SERPL-CCNC: 20 U/L (ref 14–36)
BARBITURATES UR QL SCN: NEGATIVE
BASE EXCESS BLDA CALC-SCNC: -16.4 MMOL/L
BASOPHILS NFR BLD MANUAL: 0 % (ref 0–2)
BILIRUB DIRECT SERPL-MCNC: 0.4 MG/DL (ref 0–0.4)
BILIRUB SERPL-MCNC: 0.4 MG/DL (ref 0.2–1.3)
BUN SERPL-MCNC: 100 MG/DL (ref 7–20)
BUN SERPL-MCNC: 113 MG/DL (ref 7–20)
BUN SERPL-MCNC: 120 MG/DL (ref 7–20)
BUN SERPL-MCNC: 128 MG/DL (ref 7–20)
CALCIUM: 7.5 MG/DL (ref 8.4–10.2)
CALCIUM: 8.1 MG/DL (ref 8.4–10.2)
CALCIUM: 8.3 MG/DL (ref 8.4–10.2)
CALCIUM: 9.5 MG/DL (ref 8.4–10.2)
CHLORIDE SERPL-SCNC: 106 MMOL/L (ref 98–107)
CHLORIDE SERPL-SCNC: 107 MMOL/L (ref 98–107)
CHLORIDE SERPL-SCNC: 109 MMOL/L (ref 98–107)
CHLORIDE SERPL-SCNC: 99 MMOL/L (ref 98–107)
CO2 SERPL-SCNC: 10 MMOL/L (ref 22–30)
CO2 SERPL-SCNC: 11 MMOL/L (ref 22–30)
CO2 SERPL-SCNC: 11 MMOL/L (ref 22–30)
CO2 SERPL-SCNC: 14 MMOL/L (ref 22–30)
CREAT UR-MCNC: 127.2 MG/DL (ref 15–278)
EOSINOPHIL NFR BLD MANUAL: 0 % (ref 0–6)
ERYTHROCYTE [DISTWIDTH] IN BLOOD BY AUTOMATED COUNT: 16.3 % (ref 11.5–14)
GLUCOSE SERPL-MCNC: 146 MG/DL (ref 75–110)
GLUCOSE SERPL-MCNC: 63 MG/DL (ref 75–110)
GLUCOSE SERPL-MCNC: 88 MG/DL (ref 75–110)
GLUCOSE SERPL-MCNC: 95 MG/DL (ref 75–110)
HCT VFR BLD CALC: 28 % (ref 36–47)
HGB BLD-MCNC: 9 G/DL (ref 12–15.5)
MCH RBC QN AUTO: 29.2 PG (ref 27–33.4)
MCHC RBC AUTO-ENTMCNC: 32.3 G/DL (ref 32–36)
MCV RBC AUTO: 91 FL (ref 80–97)
METHADONE UR QL SCN: NEGATIVE
MONOCYTES % (MANUAL): 3 % (ref 3–13)
NEUTS BAND NFR BLD MANUAL: 1 % (ref 3–5)
PCP UR QL SCN: NEGATIVE
PHOSPHATE SERPL-MCNC: 7.1 MG/DL (ref 2.5–4.5)
PLATELET # BLD: 333 10^3/UL (ref 150–450)
PLATELET COMMENT: ADEQUATE
POLYCHROMASIA BLD QL SMEAR: (no result)
POTASSIUM SERPL-SCNC: 5.6 MMOL/L (ref 3.6–5)
POTASSIUM SERPL-SCNC: 6.2 MMOL/L (ref 3.6–5)
PROT SERPL-MCNC: 5.4 G/DL (ref 6.3–8.2)
PROT UR STRIP-MCNC: 104.7 MG/DL (ref ?–12)
RBC # BLD AUTO: 3.09 10^6/UL (ref 3.72–5.28)
SAO2 % BLDA: 94.6 % (ref 94–98)
SEGMENTED NEUTROPHILS % (MAN): 92 % (ref 42–78)
TOTAL CELLS COUNTED BLD: 100
URINE AMPHETAMINES SCREEN: NEGATIVE
URINE BENZODIAZEPINES SCREEN: NEGATIVE
URINE COCAINE SCREEN: NEGATIVE
URINE MARIJUANA (THC) SCREEN: NEGATIVE
URINE SODIUM: 71 MMOL/L (ref 30–90)
VARIANT LYMPHS NFR BLD MANUAL: 4 % (ref 13–45)
WBC # BLD AUTO: 13 10^3/UL (ref 4–10.5)

## 2020-03-15 RX ADMIN — MORPHINE SULFATE PRN MG: 10 INJECTION INTRAMUSCULAR; INTRAVENOUS; SUBCUTANEOUS at 04:11

## 2020-03-15 RX ADMIN — NOREPINEPHRINE BITARTRATE PRN MLS/HR: 1 INJECTION, SOLUTION, CONCENTRATE INTRAVENOUS at 09:14

## 2020-03-15 RX ADMIN — NOREPINEPHRINE BITARTRATE PRN MLS/HR: 1 INJECTION, SOLUTION, CONCENTRATE INTRAVENOUS at 13:45

## 2020-03-15 RX ADMIN — SODIUM CHLORIDE PRN MLS/HR: 9 INJECTION, SOLUTION INTRAVENOUS at 04:52

## 2020-03-15 RX ADMIN — SODIUM CHLORIDE PRN MLS/HR: 9 INJECTION, SOLUTION INTRAVENOUS at 02:47

## 2020-03-15 RX ADMIN — SODIUM CHLORIDE PRN MLS/HR: 9 INJECTION, SOLUTION INTRAVENOUS at 00:53

## 2020-03-15 RX ADMIN — NOREPINEPHRINE BITARTRATE PRN MLS/HR: 1 INJECTION, SOLUTION, CONCENTRATE INTRAVENOUS at 12:15

## 2020-03-15 RX ADMIN — PANTOPRAZOLE SODIUM PRN MG: 40 INJECTION, POWDER, FOR SOLUTION INTRAVENOUS at 03:51

## 2020-03-15 NOTE — RADIOLOGY REPORT (SQ)
EXAM DESCRIPTION:  U/S RETROPERITON (RENAL/AORTA)



COMPLETED DATE/TIME:  3/15/2020 11:16 am



REASON FOR STUDY:  renal failure



COMPARISON:  CT abdomen and pelvis 1/22/2020



TECHNIQUE:  Dynamic and static grayscale images acquired of the kidneys and bladder and recorded on P
ACS. Additional selected color Doppler and spectral images recorded.



LIMITATIONS:  None.



FINDINGS:  RIGHT KIDNEY: Normal size.  Persistent hydronephrosis with the appearance of shadowing ech
ogenic foci within the collecting system.  Largest exophytic cyst emanates from the inferior pole.  B
ackground parenchymal echogenicity appears grossly normal.

LEFT KIDNEY:  Normal size. Normal echogenicity. No solid or suspicious masses. No hydronephrosis. No 
calcifications.

BLADDER: Decompressed.

OTHER FINDINGS: No other significant finding.



IMPRESSION:  Given differences in modalities, essentially stable appearance of the right kidney demon
strating marked hydronephrosis in the setting of obstructive urolithiasis.



TECHNICAL DOCUMENTATION:  JOB ID:  [id number]

 2011 [employer]- All Rights Reserved



Reading location - IP/workstation name: [email or web address]

## 2020-03-15 NOTE — PDOC H&P
History of Present Illness


Admission Date/PCP: 


  20 20:10





  GEORGIA ELLIS MD





Patient complains of: Altered mental status


History of Present Illness: 


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 

resident with a past medical history of diabetes, GERD, colorectal cancer status

post hemicolectomy with ostomy 2020 with chemoradiation and under the 

care of Dr. Shelley.  She was recently discharged 2020 following treatme

nt of abdominal wall cellulitis from ostomy leakage.  She returns with altered 

mental status disheveled with persistent ostomy leakage, gross pyuria with 

hypotension, bradycardia found to be in acute renal failure with a creatinine of

12.7, potassium 8.1 with peak T waves and severe metabolic acidosis.  She 

receives empiric antibiotics and referred to the hospitalist for admission.  Her

CODE STATUS is verified by portable DNR sheet.  She complains of pain to her 

backside





Past Medical History


Cardiac Medical History: Reports: Myocardial Infarction - unsure-saw 

cardiologist  in [facility]


   Denies: Atrial Fibrillation, Congestive Heart Failure, Coronary Artery 

Disease, Hyperlipidema, Peripheral Vascular Disease, Pulmonary Embolism, Heart 

Murmur


Pulmonary Medical History: Reports: Asthma, Bronchitis, Chronic Obstructive 

Pulmonary Disease (COPD), Pneumonia - 


   Denies: Respiratory Failure, Sleep Apnea, Tuberculosis


Neurological Medical History: 


   Denies: Seizures


Endocrine Medical History: Reports: Hyperthyroidism - liquid radiation


   Denies: Hypothyroidism


Malignancy Medical History: Reports: Colorectal Cancer


   Denies: Leukemia, Lung Cancer


GI Medical History: Reports: Gastroesophageal Reflux Disease


   Denies: Crohn's Disease, Hepatitis, Hiatal Hernia


Musculoskeltal Medical History: Reports: Arthritis


   Denies: Fibromyalgia


Psychiatric Medical History: Reports: Depression


   Denies: Bipolar Disorder, Dementia, Post Traumatic Stress Disorder


Hematology: Reports: Anemia


   Denies: Hemophilia, Sickle Cell Disease


Infectious Medical History: 


   Denies: HIV





Past Surgical History


Past Surgical History: Reports: Tubal Ligation, Other - Tumor removal of the 

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


   Denies: Amputation, Appendectomy,  Section, Cholecystectomy, 

Colostomy, Coronary Artery Bypass Graft, Gastric Bypass Surgery, Herniorrhaphy, 

Hysterectomy, Mastectomy, Pacemaker, Tonsillectomy





Social History


Information Source: Emergency Med Personnel, [facility] Records


Lives with: Nursing Home


Smoking Status: Unknown if Ever Smoked


Frequency of Alcohol Use: None


Hx Recreational Drug Use: No


Drugs: None


Hx Prescription Drug Abuse: No





- Advance Directive


Resuscitation Status: Do Not Resuscitate





Family History


Family History: CAD, DM, Hyperlipidemia, Hypertension


Parental Family History Reviewed: No - Unobtainable


Children Family History Reviewed: No - Unobtainable


Sibling(s) Family History Reviewed.: No - Unobtainable





Medication/Allergy


Home Medications: 








Aspirin [Adult Low Dose Aspirin EC] 81 mg PO DAILY #0 12 


Levothyroxine Sodium 125 mcg PO QAM 12 


Oxybutynin Chloride [Ditropan 5 mg Tablet] 5 mg PO TID #0 12 


Ramipril [Altace 5 mg Capsule] 5 mg PO DAILY #0 12 


Pravastatin Sodium [Pravachol] 20 mg PO QHS 16 


Albuterol Sulfate [Ventolin 0.042% Neb 1.25 mg/3 mL Ampul] 1 vial NEB QIDP PRN 

18 


Buspirone HCl [Buspar 15 mg Tablet] 15 mg PO TID 18 


Omeprazole 20 mg PO DAILY 18 


Sertraline HCl [Zoloft] 150 mg PO DAILY 19 


Atenolol [Tenormin] 25 mg PO QAM 20 


Hydroxyzine Pamoate [Vistaril 25 mg Capsule] 25 mg PO Q8HP PRN 20 


Magnesium Oxide [Mag-Ox 400 mg Tablet] 800 mg PO BID  tablet 20 


Sucralfate [Carafate 1 gm Tablet] 1 gm PO ACHS  tablet 20 








Allergies/Adverse Reactions: 


                                        





shellfish derived Allergy (Severe, Verified 20 02:17)


   Hives


iodine [Iodine] Allergy (Intermediate, Verified 20 02:17)


   Rash


Sulfa (Sulfonamide Antibiotics) Allergy (Intermediate, Verified 20 02:17)


   Hives


Iodinated Contrast Media Adverse Reaction (Intermediate, Verified 20 

02:17)


   Nausea











Review of Systems


ROS unobtainable: Due to mental status - Confused and unobtainable





Physical Exam


Vital Signs: 


                                        











Temp Pulse Resp BP Pulse Ox


 


 97.5 F   56 L  16   65/31 L  84 L


 


 03/15/20 03:02  03/15/20 04:28  03/15/20 03:02  03/15/20 04:28  03/15/20 03:02








                                 Intake & Output











 03/13/20 03/14/20 03/15/20





 11:59 11:59 11:59


 


Intake Total   4000


 


Output Total   50


 


Balance   3950


 


Weight   78.1 kg











General appearance: PRESENT: disheveled, hard of hearing, severe distress, thin 

- Severe cachexia with temporal wasting, well-developed


Head exam: PRESENT: atraumatic, normocephalic


Eye exam: PRESENT: conjunctiva pink, EOMI, PERRLA.  ABSENT: scleral icterus


Ear exam: PRESENT: normal external ear exam


Mouth exam: PRESENT: dry mucosa, tongue midline


Neck exam: ABSENT: carotid bruit, JVD, lymphadenopathy, thyromegaly


Respiratory exam: PRESENT: clear to auscultation susan, tachypnea.  ABSENT: rales,

 rhonchi, wheezes


Cardiovascular exam: PRESENT: RRR.  ABSENT: diastolic murmur, rubs, systolic 

murmur


Pulses: PRESENT: other - Cyanosis with diminished pulses x4.  ABSENT: normal 

dorsalis pedis pul, +1 pedal pulses bilateral - Diminished pulses


Vascular exam: PRESENT: pallor, other - Cyanosis with weak pulses x4.  ABSENT: 

normal capillary refill


GI/Abdominal exam: PRESENT: diminished bowel sounds, hypoactive bowel sounds, 

soft, tenderness, other - Large right lower quadrant ostomy with leakage.  

ABSENT: distended, firm, guarding, mass


Rectal exam: PRESENT: deferred


Extremities exam: PRESENT: full ROM.  ABSENT: calf tenderness, clubbing, pedal 

edema


Neurological exam: PRESENT: altered, awake, oriented to person, CN II-XII 

grossly intact.  ABSENT: motor sensory deficit


Psychiatric exam: PRESENT: appropriate affect, normal mood.  ABSENT: homicidal 

ideation, suicidal ideation


Skin exam: PRESENT: dry, intact, warm, other - Stage II sacral decub.  ABSENT: 

cyanosis, rash





Results


Laboratory Results: 


                                        





                                 20 17:10 





                                 20 23:25 





                                        











  20





  17:10 17:10 17:10


 


WBC  16.6 H  


 


RBC  4.32  


 


Hgb  13.1  


 


Hct  38.5  


 


MCV  89  


 


MCH  30.4  


 


MCHC  34.1  


 


RDW  16.4 H  


 


Plt Count  502 H  


 


Seg Neutrophils %  Not Reportable  


 


VBG pH    7.16 L*


 


VBG pCO2    35.5


 


VBG HCO3    12.4 L


 


VBG Base Excess    -15.3


 


Sodium   129.2 L 


 


Potassium   8.1 H* 


 


Chloride   91 L 


 


Carbon Dioxide   10 L* 


 


Anion Gap   28 H 


 


BUN   136 H 


 


Creatinine   12.73 H 


 


Est GFR (African Amer)   4 L 


 


Glucose   123 H 


 


Lactic Acid   


 


Calcium   10.9 H 


 


Magnesium   


 


Total Bilirubin   0.7 


 


AST   23 


 


Alkaline Phosphatase   257 H 


 


Total Protein   8.5 H 


 


Albumin   4.8 


 


Urine Color   


 


Urine Appearance   


 


Urine pH   


 


Ur Specific Gravity   


 


Urine Protein   


 


Urine Glucose (UA)   


 


Urine Ketones   


 


Urine Blood   


 


Urine Nitrite   


 


Ur Leukocyte Esterase   


 


Urine WBC (Auto)   


 


Urine RBC (Auto)   


 


Blood Type   


 


Antibody Screen   














  20





  17:10 17:10 17:10


 


WBC   


 


RBC   


 


Hgb   


 


Hct   


 


MCV   


 


MCH   


 


MCHC   


 


RDW   


 


Plt Count   


 


Seg Neutrophils %   


 


VBG pH   


 


VBG pCO2   


 


VBG HCO3   


 


VBG Base Excess   


 


Sodium   


 


Potassium   


 


Chloride   


 


Carbon Dioxide   


 


Anion Gap   


 


BUN   


 


Creatinine   


 


Est GFR (African Amer)   


 


Glucose   


 


Lactic Acid  1.2  


 


Calcium   


 


Magnesium    4.4 H*


 


Total Bilirubin   


 


AST   


 


Alkaline Phosphatase   


 


Total Protein   


 


Albumin   


 


Urine Color   BROWN 


 


Urine Appearance   TURBID 


 


Urine pH   5.0 


 


Ur Specific Gravity   1.019 


 


Urine Protein   100 H 


 


Urine Glucose (UA)   NEGATIVE 


 


Urine Ketones   TRACE H 


 


Urine Blood   MODERATE H 


 


Urine Nitrite   NEGATIVE 


 


Ur Leukocyte Esterase   MODERATE H 


 


Urine WBC (Auto)   >182 


 


Urine RBC (Auto)   59 


 


Blood Type   


 


Antibody Screen   














  20





  17:45 20:15 23:25


 


WBC   


 


RBC   


 


Hgb   


 


Hct   


 


MCV   


 


MCH   


 


MCHC   


 


RDW   


 


Plt Count   


 


Seg Neutrophils %   


 


VBG pH   


 


VBG pCO2   


 


VBG HCO3   


 


VBG Base Excess   


 


Sodium   


 


Potassium   


 


Chloride   


 


Carbon Dioxide   


 


Anion Gap   


 


BUN   


 


Creatinine   


 


Est GFR (African Amer)   


 


Glucose   


 


Lactic Acid   2.3 H  1.0


 


Calcium   


 


Magnesium   


 


Total Bilirubin   


 


AST   


 


Alkaline Phosphatase   


 


Total Protein   


 


Albumin   


 


Urine Color   


 


Urine Appearance   


 


Urine pH   


 


Ur Specific Gravity   


 


Urine Protein   


 


Urine Glucose (UA)   


 


Urine Ketones   


 


Urine Blood   


 


Urine Nitrite   


 


Ur Leukocyte Esterase   


 


Urine WBC (Auto)   


 


Urine RBC (Auto)   


 


Blood Type  B POSITIVE  


 


Antibody Screen  NEGATIVE  














  20





  23:25


 


WBC 


 


RBC 


 


Hgb 


 


Hct 


 


MCV 


 


MCH 


 


MCHC 


 


RDW 


 


Plt Count 


 


Seg Neutrophils % 


 


VBG pH 


 


VBG pCO2 


 


VBG HCO3 


 


VBG Base Excess 


 


Sodium  131.9 L


 


Potassium  6.2 H* D


 


Chloride  99


 


Carbon Dioxide  10 L*


 


Anion Gap  23 H


 


BUN  128 H


 


Creatinine  11.22 H


 


Est GFR (African Amer)  4 L


 


Glucose  88


 


Lactic Acid 


 


Calcium  9.5


 


Magnesium 


 


Total Bilirubin 


 


AST 


 


Alkaline Phosphatase 


 


Total Protein 


 


Albumin 


 


Urine Color 


 


Urine Appearance 


 


Urine pH 


 


Ur Specific Gravity 


 


Urine Protein 


 


Urine Glucose (UA) 


 


Urine Ketones 


 


Urine Blood 


 


Urine Nitrite 


 


Ur Leukocyte Esterase 


 


Urine WBC (Auto) 


 


Urine RBC (Auto) 


 


Blood Type 


 


Antibody Screen 








                                        











  20





  17:10 17:10


 


Creatine Kinase  70 


 


Troponin I   0.060


 


NT-Pro-B Natriuret Pep   2600 H











Impressions: 


                                        





Chest X-Ray  20 17:53


IMPRESSION:  NO ACUTE RADIOGRAPHIC FINDING IN THE CHEST.


 








KUB X-Ray  20 17:53


IMPRESSION:  Right staghorn calculus.  No acute finding in the abdomen.


 














Assessment and Plan





- Diagnosis


(1) Severe sepsis


Is this a current diagnosis for this admission?: Yes   


Plan: 


Secondary to UTI with gross pyuria recent urinalysis revealed cefepime 

susceptible Klebsiella.  Not a candidate for pressors given DNR status and 

terminal comorbidities.  IV fluid challenge, empiric antibiotics, follow-up CBC 

and blood culture








(2) Hyperkalemia


Is this a current diagnosis for this admission?: Yes   


Plan: 


Complicated by peaked T waves, calcium, albuterol, ongoing IV fluid 

resuscitation.








(3) Acidosis


Is this a current diagnosis for this admission?: Yes   


Plan: 


Secondary to severe sepsis and hypotension.  Follow-up lactic acid








(4) Renal failure (ARF), acute on chronic


Qualifiers: 


   Acute renal failure type: unspecified   Chronic kidney disease stage: 

unspecified stage   Qualified Code(s): N17.9 - Acute kidney failure, 

unspecified; N18.9 - Chronic kidney disease, unspecified   


Is this a current diagnosis for this admission?: Yes   


Plan: 


Secondary to severe sepsis with hypotension.  IV fluid challenge, avoid 

nephrotoxic meds and doses follow-up chemistry








- Time


Time Spent with patient: 35 or more minutes





- Inpatient Certification


Medical Necessity: Need Close Monitoring Due to Risk of Patient Decompensation

## 2020-03-15 NOTE — PDOC TRANSFER SUMMARY
<SHOAIBMANDA KIMBALL - Last Filed: 03/15/20 08:45>





General


Admission Date/PCP: 


  03/14/20 20:10





  GEORGIA ELLIS MD





Admission Date: 03/14/20


Accepting Facility: [facility]


Accepting Physician: Dr. Mayes


Resuscitation Status: Full Code





- Transfer Diagnosis


(1) Acute renal failure


Is this a current diagnosis for this admission?: Yes   





(2) Hyperkalemia


Is this a current diagnosis for this admission?: Yes   





(3) Hypotension


Is this a current diagnosis for this admission?: Yes   





(4) Hypothermia


Is this a current diagnosis for this admission?: Yes   





(5) Leukocytosis


Is this a current diagnosis for this admission?: Yes   





(6) Severe sepsis


Is this a current diagnosis for this admission?: Yes   





(7) Urinary tract infection


Is this a current diagnosis for this admission?: Yes   





(8) COPD (chronic obstructive pulmonary disease)


Is this a current diagnosis for this admission?: Yes   





(9) Hypothyroidism


Is this a current diagnosis for this admission?: Yes   





- Transfer Medications


Home Medications: 


                                        





Levothyroxine Sodium 125 mcg PO QAM 03/09/12 


Oxybutynin Chloride [Ditropan 5 mg Tablet] 5 mg PO TID #0 03/09/12 


Ramipril [Altace 5 mg Capsule] 5 mg PO DAILY #0 03/09/12 


Pravastatin Sodium [Pravachol] 20 mg PO QHS 12/28/16 


Albuterol Sulfate [Ventolin 0.042% Neb 1.25 mg/3 mL Ampul] 1 vial NEB Q6HP PRN 

07/09/18 


Buspirone HCl [Buspar 15 mg Tablet] 15 mg PO TID 07/09/18 


Omeprazole 20 mg PO DAILY 07/09/18 


Sertraline HCl [Zoloft] 150 mg PO DAILY 09/20/19 


Atenolol [Tenormin] 25 mg PO QAM 01/23/20 


Hydroxyzine Pamoate [Vistaril 25 mg Capsule] 25 mg PO Q8HP PRN 02/28/20 


Acetaminophen [Tylenol 325 mg Tablet] 650 mg PO Q4HP PRN 03/15/20 


Aspirin [Aspirin 81 mg Chewable Tablet] 81 mg PO DAILY 03/15/20 


Lisinopril 20 mg PO DAILY 03/15/20 








Transfer Medications: 


                               Current Medications





Acetaminophen (Tylenol 650 Mg Supp)  650 mg RI Q4HP PRN


   PRN Reason: FOR PAIN OR TEMP


   Stop: 04/13/20 19:57


Acetaminophen (Tylenol 325 Mg Tablet)  650 mg PO Q4HP PRN


   PRN Reason: FOR HEADACHE OR PAIN


   Stop: 04/13/20 19:57


Al Hydrox/Mg Hydrox/Simethicone (Maalox Plus Susp 30 Udcup)  30 ml PO Q6HP PRN


   PRN Reason: HEARTBURN


   Stop: 04/13/20 19:57


Albuterol/Ipratropium (Duoneb 3 Ml Ampul)  3 ml NEB RTQ6HP PRN


   PRN Reason: SHORTNESS OF BREATH


   Stop: 04/13/20 19:57


Heparin Sodium (Porcine) (Heparin Inj 5,000 Units/Ml 1 Ml Vial)  5,000 unit 

SUBCUT Q8 [place]


   Stop: 04/13/20 21:59


   Last Admin: 03/15/20 06:06 Dose:  Not Given


   Documented by: 


Dopamine HCl/Dextrose (Dopamine Rtu 800 Mg-D5w 250 Ml (Adult) Premix)  800 mg in

250 mls @ 4.461 mls/hr IV CONTINUOUS PRN


   PRN Reason: THIS MED IS NOT "PRN"


   Stop: 04/14/20 07:46


Sodium Chloride (Nacl 0.9% 250 Ml Iv Soln)  250 mls @ 30 mls/hr IV .DURING 

TRANSFUSION PRN


   PRN Reason: THIS MED IS NOT "PRN"


   Stop: 03/16/20 07:49


Sodium Chloride (Nacl 0.9% 250 Ml Iv Soln)  250 mls @ 0 mls/hr IV CONTINUOUS PRN


   PRN Reason: AFTER EACH UNIT


   Stop: 03/16/20 07:49


Cefepime HCl (Maxipime Rtu 1 Gm/D5w 50 Ml Premix Bag)  1 gm in 50 mls @ 100 

mls/hr IV QHS [place]


   Stop: 03/22/20 21:59


Sodium Bicarbonate 150 meq/ (Dextrose)  1,000 mls @ 150 mls/hr IV CONTINUOUS PRN


   PRN Reason: THIS MED IS NOT "PRN"


   Stop: 04/14/20 08:26


Sodium Chloride (Nacl 0.9% 1000 Ml Iv Soln)  1,000 mls @ 100 mls/hr IV 

CONTINUOUS PRN


   PRN Reason: THIS MED IS NOT "PRN"


   Stop: 04/14/20 08:38


Norepinephrine Bitartrate 4 mg (/ Dextrose)  250 mls @ 0 mls/hr IV CONTINUOUS 

PRN; Protocol


   PRN Reason: THIS MED IS NOT "PRN"


   Stop: 04/14/20 08:39


Morphine Sulfate (Morphine 10 Mg/Ml Inj)  2 mg IV Q3HP PRN


   PRN Reason: FOR PAIN


   Stop: 03/21/20 19:49


   Last Admin: 03/15/20 04:11 Dose:  2 mg


   Documented by: 











- Allergies


Allergies/Adverse Reactions: 


                                        





shellfish derived Allergy (Severe, Verified 01/22/20 02:17)


   Hives


iodine [Iodine] Allergy (Intermediate, Verified 01/22/20 02:17)


   Rash


Sulfa (Sulfonamide Antibiotics) Allergy (Intermediate, Verified 01/22/20 02:17)


   Hives


Iodinated Contrast Media Adverse Reaction (Intermediate, Verified 01/22/20 

02:17)


   Nausea











- Diet/Activity


Discharge Diet: Clear Liquids





Hospital Course


Hospital Course: 


Per H&P by Dr. Aguirre: SERGEI BAKER is a 64 year old female who is a long-

term nursing home resident with a past medical history of diabetes, GERD, 

colorectal cancer status post hemicolectomy with ostomy January 2020 with 

chemoradiation and under the care of Dr. Shelley.  She was recently discharged 

March 4, 2020 following treatment of abdominal wall cellulitis from ostomy 

leakage.  She returns with altered mental status disheveled with persistent 

ostomy leakage, gross pyuria with hypotension, bradycardia found to be in acute 

renal failure with a creatinine of 12.7, potassium 8.1 with peak T waves and 

severe metabolic acidosis.  She receives empiric antibiotics and referred to the

 hospitalist for admission.  Her CODE STATUS is verified by portable DNR sheet. 

 She complains of pain to her backside





Course: The patient was admitted to [facility] on continuous cardiac telemetry.  She 

continues to have difficulty regulating temperature and remains on Lavelle hugger. 

 Since admission last night, she has received 6 L normal saline for fluid 

resuscitation, calcium gluconate/dextrose/insulin for hyperkalemia correction, 

and sodium bicarb 100 M EQ's for acidosis.  She was empirically placed on IV 

antibiotics and has received ceftriaxone, cefepime, and vancomycin.  Blood and 

urine cultures are pending.


AM lab work shows improvement in leukocytosis (16.6-> 13), hemoglobin has 

expectedly trended down (13.1-> 9.0), ABG this morning demonstrates persistent 

acidosis with pH 7.17/PCO2 29.7/PO2 88.9/HCO3 10.7, and follow-up EKG shows 

resolution of peaked T waves.


On exam, the patient does not appear to be fluid overloaded; continues to have 

dry mucous membranes with clear lung sounds.  Therefore, she was ordered 

additional IV fluids, bicarb drip, and dopamine for renal perfusion.  


Her mentation has improved this morning to the extent that she is arousable, t

ramiro with delayed responses, and oriented to self and place.  She adamantly 

declares that she is a FULL CODE; requesting all medical interventions including

 ICU admission, intubation, and CPR.


Therefore, the patient's case was discussed with intensivist service who 

recommended reaching out to tertiary care center in anticipation of need for 

emergent dialysis not available at our facility.


I discussed the case with Dr. Mayes, MICU Intensivist at [facility],

 who has graciously agreed to accept this patient.  He does caution that there 

may be a delay in bed availability and therefore recommends upgrade to ICU with 

norepinephrine and continued generous IV fluids as her clinical status allows.


The patient was subsequently discussed with Dr. Berry, [facility] intensivist.  She is upgraded to the ICU pending her transfer to 

[facility].





Physical Exam


Vital Signs: 


                                        











Temp Pulse Resp BP Pulse Ox


 


 97.7 F   67   16   63/34 L  100 


 


 03/15/20 08:10  03/15/20 08:10  03/15/20 08:10  03/15/20 08:10  03/15/20 08:10








                                 Intake & Output











 03/14/20 03/15/20 03/16/20





 06:59 06:59 06:59


 


Intake Total  4302 2000


 


Output Total  62 


 


Balance  4240 2000


 


Weight  79.3 kg 











General appearance: PRESENT: disheveled, well-developed, well-nourished, other -

 chronically ill appearing


Head exam: PRESENT: atraumatic, normocephalic


Eye exam: PRESENT: conjunctiva pale, EOMI, PERRLA.  ABSENT: scleral icterus


Mouth exam: PRESENT: dry mucosa, moist, tongue midline


Neck exam: ABSENT: carotid bruit, JVD, lymphadenopathy, thyromegaly


Respiratory exam: PRESENT: clear to auscultation susan, decreased breath sounds - 

bibasilar, symmetrical, unlabored.  ABSENT: rales, rhonchi, wheezes


Cardiovascular exam: PRESENT: RRR.  ABSENT: diastolic murmur, rubs, systolic 

murmur


Pulses: PRESENT: +1 pedal pulses bilateral


Vascular exam: PRESENT: pallor - delayed cap refill all extremities


GI/Abdominal exam: PRESENT: hypoactive bowel sounds, soft, other - colostomy and

 ileostomy.  ABSENT: distended, guarding, mass, organolmegaly, rebound, 

tenderness


Extremities exam: PRESENT: full ROM.  ABSENT: calf tenderness, clubbing, pedal 

edema


Neurological exam: PRESENT: oriented to person, oriented to place, CN II-XII 

grossly intact, other - Arousable, delayed responses.  ABSENT: motor sensory 

deficit


Psychiatric exam: PRESENT: flat affect, normal mood.  ABSENT: homicidal 

ideation, suicidal ideation


Skin exam: PRESENT: erythema - Slight erythema surrounding ileostomy in colo

stomy sites, intact, mottled - BLE, pallor, warm.  ABSENT: cyanosis, rash





Results


Laboratory Results: 


                                        





                                 03/15/20 04:49 





                                 03/15/20 04:49 





                                        











  03/14/20 03/14/20 03/14/20





  17:10 17:10 17:10


 


WBC  16.6 H  


 


RBC  4.32  


 


Hgb  13.1  


 


Hct  38.5  


 


MCV  89  


 


MCH  30.4  


 


MCHC  34.1  


 


RDW  16.4 H  


 


Plt Count  502 H  


 


Seg Neutrophils %  Not Reportable  


 


Carbonic Acid   


 


HCO3/H2CO3 Ratio   


 


ABG pH   


 


ABG pCO2   


 


ABG pO2   


 


ABG HCO3   


 


ABG O2 Saturation   


 


ABG Base Excess   


 


VBG pH    7.16 L*


 


VBG pCO2    35.5


 


VBG HCO3    12.4 L


 


VBG Base Excess    -15.3


 


FiO2   


 


Sodium   129.2 L 


 


Potassium   8.1 H* 


 


Chloride   91 L 


 


Carbon Dioxide   10 L* 


 


Anion Gap   28 H 


 


BUN   136 H 


 


Creatinine   12.73 H 


 


Est GFR (African Amer)   4 L 


 


Glucose   123 H 


 


Lactic Acid   


 


Calcium   10.9 H 


 


Magnesium   


 


Total Bilirubin   0.7 


 


AST   23 


 


Alkaline Phosphatase   257 H 


 


Total Protein   8.5 H 


 


Albumin   4.8 


 


Urine Color   


 


Urine Appearance   


 


Urine pH   


 


Ur Specific Gravity   


 


Urine Protein   


 


Urine Glucose (UA)   


 


Urine Ketones   


 


Urine Blood   


 


Urine Nitrite   


 


Ur Leukocyte Esterase   


 


Urine WBC (Auto)   


 


Urine RBC (Auto)   


 


Blood Type   


 


Antibody Screen   














  03/14/20 03/14/20 03/14/20





  17:10 17:10 17:10


 


WBC   


 


RBC   


 


Hgb   


 


Hct   


 


MCV   


 


MCH   


 


MCHC   


 


RDW   


 


Plt Count   


 


Seg Neutrophils %   


 


Carbonic Acid   


 


HCO3/H2CO3 Ratio   


 


ABG pH   


 


ABG pCO2   


 


ABG pO2   


 


ABG HCO3   


 


ABG O2 Saturation   


 


ABG Base Excess   


 


VBG pH   


 


VBG pCO2   


 


VBG HCO3   


 


VBG Base Excess   


 


FiO2   


 


Sodium   


 


Potassium   


 


Chloride   


 


Carbon Dioxide   


 


Anion Gap   


 


BUN   


 


Creatinine   


 


Est GFR (African Amer)   


 


Glucose   


 


Lactic Acid  1.2  


 


Calcium   


 


Magnesium    4.4 H*


 


Total Bilirubin   


 


AST   


 


Alkaline Phosphatase   


 


Total Protein   


 


Albumin   


 


Urine Color   BROWN 


 


Urine Appearance   TURBID 


 


Urine pH   5.0 


 


Ur Specific Gravity   1.019 


 


Urine Protein   100 H 


 


Urine Glucose (UA)   NEGATIVE 


 


Urine Ketones   TRACE H 


 


Urine Blood   MODERATE H 


 


Urine Nitrite   NEGATIVE 


 


Ur Leukocyte Esterase   MODERATE H 


 


Urine WBC (Auto)   >182 


 


Urine RBC (Auto)   59 


 


Blood Type   


 


Antibody Screen   














  03/14/20 03/14/20 03/14/20





  17:45 20:15 23:25


 


WBC   


 


RBC   


 


Hgb   


 


Hct   


 


MCV   


 


MCH   


 


MCHC   


 


RDW   


 


Plt Count   


 


Seg Neutrophils %   


 


Carbonic Acid   


 


HCO3/H2CO3 Ratio   


 


ABG pH   


 


ABG pCO2   


 


ABG pO2   


 


ABG HCO3   


 


ABG O2 Saturation   


 


ABG Base Excess   


 


VBG pH   


 


VBG pCO2   


 


VBG HCO3   


 


VBG Base Excess   


 


FiO2   


 


Sodium   


 


Potassium   


 


Chloride   


 


Carbon Dioxide   


 


Anion Gap   


 


BUN   


 


Creatinine   


 


Est GFR (African Amer)   


 


Glucose   


 


Lactic Acid   2.3 H  1.0


 


Calcium   


 


Magnesium   


 


Total Bilirubin   


 


AST   


 


Alkaline Phosphatase   


 


Total Protein   


 


Albumin   


 


Urine Color   


 


Urine Appearance   


 


Urine pH   


 


Ur Specific Gravity   


 


Urine Protein   


 


Urine Glucose (UA)   


 


Urine Ketones   


 


Urine Blood   


 


Urine Nitrite   


 


Ur Leukocyte Esterase   


 


Urine WBC (Auto)   


 


Urine RBC (Auto)   


 


Blood Type  B POSITIVE  


 


Antibody Screen  NEGATIVE  














  03/14/20 03/15/20 03/15/20





  23:25 04:49 04:49


 


WBC   13.0 H 


 


RBC   3.09 L 


 


Hgb   9.0 L D 


 


Hct   28.0 L 


 


MCV   91 


 


MCH   29.2 


 


MCHC   32.3 


 


RDW   16.3 H 


 


Plt Count   333 


 


Seg Neutrophils %   Not Reportable 


 


Carbonic Acid   


 


HCO3/H2CO3 Ratio   


 


ABG pH   


 


ABG pCO2   


 


ABG pO2   


 


ABG HCO3   


 


ABG O2 Saturation   


 


ABG Base Excess   


 


VBG pH   


 


VBG pCO2   


 


VBG HCO3   


 


VBG Base Excess   


 


FiO2   


 


Sodium  131.9 L   133.7 L


 


Potassium  6.2 H* D   6.2 H*


 


Chloride  99   106


 


Carbon Dioxide  10 L*   14 L


 


Anion Gap  23 H   14


 


BUN  128 H   120 H


 


Creatinine  11.22 H   10.35 H


 


Est GFR ( Amer)  4 L   5 L


 


Glucose  88   63 L


 


Lactic Acid   


 


Calcium  9.5   8.3 L


 


Magnesium   


 


Total Bilirubin   


 


AST   


 


Alkaline Phosphatase   


 


Total Protein   


 


Albumin   


 


Urine Color   


 


Urine Appearance   


 


Urine pH   


 


Ur Specific Gravity   


 


Urine Protein   


 


Urine Glucose (UA)   


 


Urine Ketones   


 


Urine Blood   


 


Urine Nitrite   


 


Ur Leukocyte Esterase   


 


Urine WBC (Auto)   


 


Urine RBC (Auto)   


 


Blood Type   


 


Antibody Screen   














  03/15/20





  08:05


 


WBC 


 


RBC 


 


Hgb 


 


Hct 


 


MCV 


 


MCH 


 


MCHC 


 


RDW 


 


Plt Count 


 


Seg Neutrophils % 


 


Carbonic Acid  0.89 L


 


HCO3/H2CO3 Ratio  12:1


 


ABG pH  7.17 L*


 


ABG pCO2  29.7 L


 


ABG pO2  88.9


 


ABG HCO3  10.7 L


 


ABG O2 Saturation  94.6


 


ABG Base Excess  -16.4


 


VBG pH 


 


VBG pCO2 


 


VBG HCO3 


 


VBG Base Excess 


 


FiO2  ROOM AIR


 


Sodium 


 


Potassium 


 


Chloride 


 


Carbon Dioxide 


 


Anion Gap 


 


BUN 


 


Creatinine 


 


Est GFR (African Amer) 


 


Glucose 


 


Lactic Acid 


 


Calcium 


 


Magnesium 


 


Total Bilirubin 


 


AST 


 


Alkaline Phosphatase 


 


Total Protein 


 


Albumin 


 


Urine Color 


 


Urine Appearance 


 


Urine pH 


 


Ur Specific Gravity 


 


Urine Protein 


 


Urine Glucose (UA) 


 


Urine Ketones 


 


Urine Blood 


 


Urine Nitrite 


 


Ur Leukocyte Esterase 


 


Urine WBC (Auto) 


 


Urine RBC (Auto) 


 


Blood Type 


 


Antibody Screen 








                                        











  03/14/20 03/14/20





  17:10 17:10


 


Creatine Kinase  70 


 


Troponin I   0.060


 


NT-Pro-B Natriuret Pep   2600 H











Impressions: 


                                        





Chest X-Ray  03/14/20 17:53


IMPRESSION:  NO ACUTE RADIOGRAPHIC FINDING IN THE CHEST.


 








KUB X-Ray  03/14/20 17:53


IMPRESSION:  Right staghorn calculus.  No acute finding in the abdomen.


 














Plan


Discharge Plan: 


Patient is upgraded to [facility] ICU pending transfer to [facility].  She 

has been accepted to the MICU, under care of Dr. Mayes, and currently awaits 

bed availability.


Time Spent: Greater than 30 Minutes





<CHEIKH BERRY - Last Filed: 03/15/20 12:00>





General


Admission Date/PCP: 


  03/14/20 20:10





  GEORGIA ELLIS MD








- Transfer Diagnosis


(1) Sepsis with acute organ dysfunction and septic shock


Is this a current diagnosis for this admission?: Yes   





(2) Acute renal failure


Is this a current diagnosis for this admission?: Yes   





(3) Acute metabolic encephalopathy


Is this a current diagnosis for this admission?: Yes   





(4) Hyperkalemia, diminished renal excretion


Is this a current diagnosis for this admission?: Yes   





(5) Lactic acidosis


Is this a current diagnosis for this admission?: Yes   





(6) Colorectal carcinoma


Is this a current diagnosis for this admission?: Yes   





- Transfer Medications


Transfer Medications: 


                               Current Medications





Albuterol/Ipratropium (Duoneb 3 Ml Ampul)  3 ml NEB RTQ6HP PRN


   PRN Reason: SHORTNESS OF BREATH


   Stop: 04/13/20 19:57


Sodium Chloride (Nacl 0.9% 250 Ml Iv Soln)  250 mls @ 0 mls/hr IV CONTINUOUS PRN


   PRN Reason: AFTER EACH UNIT


   Stop: 03/16/20 07:49


Cefepime HCl (Maxipime Rtu 1 Gm/D5w 50 Ml Premix Bag)  1 gm in 50 mls @ 100 

mls/hr IV QHS [facility]


   Stop: 03/22/20 21:59


Sodium Bicarbonate 150 meq/ (Dextrose)  1,000 mls @ 150 mls/hr IV CONTINUOUS PRN


   PRN Reason: THIS MED IS NOT "PRN"


   Stop: 04/14/20 08:26


Norepinephrine Bitartrate 4 mg (/ Dextrose)  250 mls @ 0 mls/hr IV CONTINUOUS 

PRN; Protocol


   PRN Reason: THIS MED IS NOT "PRN"


   Stop: 04/14/20 08:39


   Last Admin: 03/15/20 09:14 Dose:  12 mcg/min, 45 mls/hr


   Documented by: 


Gentamicin Sulfate 250 mg/ (Dextrose)  106.25 mls @ 100 mls/hr IV NOW ONE


   Stop: 03/15/20 12:33


Vancomycin HCl 750 mg/ (Dextrose)  250 mls @ 166.667 mls/hr IV Q3D@0600 [facility]


   Stop: 03/25/20 05:59


Vasopressin 100 unit/ Dextrose  255 mls @ 0 mls/hr IV CONTINUOUS PRN


   PRN Reason: THIS MED IS NOT "PRN"


   Stop: 04/14/20 10:38


   Last Admin: 03/15/20 11:30 Dose:  0.03 unit/min, 4.59 mls/hr


   Documented by: 











Hospital Course


Hospital Course: 


I personally discussed this patient with the hospitalist service and brought the

 patient to the ICU pending transport to [facility].  My concern was 

that she was in a shock pattern and may need further resuscitation pending 

transfer.  The patient was able to answer her name but was not able to to offer 

any complaints given her mental status.  Did receive 2 mg of morphine at 

approximately 4:00 this morning.  There have been no episodes of hypoxia as far 

as can be determined.


We see my critical care admission note for further details.





Physical Exam


Vital Signs: 


                                        











Temp Pulse Resp BP Pulse Ox


 


 94.6 F L  93   18   94/56 L  98 


 


 03/15/20 10:00  03/15/20 10:00  03/15/20 10:00  03/15/20 10:00  03/15/20 10:00








                                 Intake & Output











 03/14/20 03/15/20 03/16/20





 06:59 06:59 06:59


 


Intake Total  4302 2011


 


Output Total  62 0


 


Balance  4240 2011


 


Weight  79.3 kg 














Results


Laboratory Results: 


                                        





                                 03/15/20 04:49 





                                 03/15/20 10:55 





                                        











  03/14/20 03/14/20 03/14/20





  17:10 17:10 17:10


 


WBC  16.6 H  


 


RBC  4.32  


 


Hgb  13.1  


 


Hct  38.5  


 


MCV  89  


 


MCH  30.4  


 


MCHC  34.1  


 


RDW  16.4 H  


 


Plt Count  502 H  


 


Seg Neutrophils %  Not Reportable  


 


Carbonic Acid   


 


HCO3/H2CO3 Ratio   


 


ABG pH   


 


ABG pCO2   


 


ABG pO2   


 


ABG HCO3   


 


ABG O2 Saturation   


 


ABG Base Excess   


 


VBG pH    7.16 L*


 


VBG pCO2    35.5


 


VBG HCO3    12.4 L


 


VBG Base Excess    -15.3


 


FiO2   


 


Sodium   129.2 L 


 


Potassium   8.1 H* 


 


Chloride   91 L 


 


Carbon Dioxide   10 L* 


 


Anion Gap   28 H 


 


BUN   136 H 


 


Creatinine   12.73 H 


 


Est GFR (African Amer)   4 L 


 


Glucose   123 H 


 


Lactic Acid   


 


Calcium   10.9 H 


 


Phosphorus   


 


Magnesium   


 


Total Bilirubin   0.7 


 


AST   23 


 


Alkaline Phosphatase   257 H 


 


Ammonia   


 


Total Protein   8.5 H 


 


Albumin   4.8 


 


Urine Color   


 


Urine Appearance   


 


Urine pH   


 


Ur Specific Gravity   


 


Urine Protein   


 


Urine Glucose (UA)   


 


Urine Ketones   


 


Urine Blood   


 


Urine Nitrite   


 


Ur Leukocyte Esterase   


 


Urine WBC (Auto)   


 


Urine RBC (Auto)   


 


Blood Type   


 


Antibody Screen   














  03/14/20 03/14/20 03/14/20





  17:10 17:10 17:10


 


WBC   


 


RBC   


 


Hgb   


 


Hct   


 


MCV   


 


MCH   


 


MCHC   


 


RDW   


 


Plt Count   


 


Seg Neutrophils %   


 


Carbonic Acid   


 


HCO3/H2CO3 Ratio   


 


ABG pH   


 


ABG pCO2   


 


ABG pO2   


 


ABG HCO3   


 


ABG O2 Saturation   


 


ABG Base Excess   


 


VBG pH   


 


VBG pCO2   


 


VBG HCO3   


 


VBG Base Excess   


 


FiO2   


 


Sodium   


 


Potassium   


 


Chloride   


 


Carbon Dioxide   


 


Anion Gap   


 


BUN   


 


Creatinine   


 


Est GFR (African Amer)   


 


Glucose   


 


Lactic Acid  1.2  


 


Calcium   


 


Phosphorus   


 


Magnesium    4.4 H*


 


Total Bilirubin   


 


AST   


 


Alkaline Phosphatase   


 


Ammonia   


 


Total Protein   


 


Albumin   


 


Urine Color   BROWN 


 


Urine Appearance   TURBID 


 


Urine pH   5.0 


 


Ur Specific Gravity   1.019 


 


Urine Protein   100 H 


 


Urine Glucose (UA)   NEGATIVE 


 


Urine Ketones   TRACE H 


 


Urine Blood   MODERATE H 


 


Urine Nitrite   NEGATIVE 


 


Ur Leukocyte Esterase   MODERATE H 


 


Urine WBC (Auto)   >182 


 


Urine RBC (Auto)   59 


 


Blood Type   


 


Antibody Screen   














  03/14/20 03/14/20 03/14/20





  17:45 20:15 23:25


 


WBC   


 


RBC   


 


Hgb   


 


Hct   


 


MCV   


 


MCH   


 


MCHC   


 


RDW   


 


Plt Count   


 


Seg Neutrophils %   


 


Carbonic Acid   


 


HCO3/H2CO3 Ratio   


 


ABG pH   


 


ABG pCO2   


 


ABG pO2   


 


ABG HCO3   


 


ABG O2 Saturation   


 


ABG Base Excess   


 


VBG pH   


 


VBG pCO2   


 


VBG HCO3   


 


VBG Base Excess   


 


FiO2   


 


Sodium   


 


Potassium   


 


Chloride   


 


Carbon Dioxide   


 


Anion Gap   


 


BUN   


 


Creatinine   


 


Est GFR (African Amer)   


 


Glucose   


 


Lactic Acid   2.3 H  1.0


 


Calcium   


 


Phosphorus   


 


Magnesium   


 


Total Bilirubin   


 


AST   


 


Alkaline Phosphatase   


 


Ammonia   


 


Total Protein   


 


Albumin   


 


Urine Color   


 


Urine Appearance   


 


Urine pH   


 


Ur Specific Gravity   


 


Urine Protein   


 


Urine Glucose (UA)   


 


Urine Ketones   


 


Urine Blood   


 


Urine Nitrite   


 


Ur Leukocyte Esterase   


 


Urine WBC (Auto)   


 


Urine RBC (Auto)   


 


Blood Type  B POSITIVE  


 


Antibody Screen  NEGATIVE  














  03/14/20 03/15/20 03/15/20





  23:25 04:49 04:49


 


WBC   13.0 H 


 


RBC   3.09 L 


 


Hgb   9.0 L D 


 


Hct   28.0 L 


 


MCV   91 


 


MCH   29.2 


 


MCHC   32.3 


 


RDW   16.3 H 


 


Plt Count   333 


 


Seg Neutrophils %   Not Reportable 


 


Carbonic Acid   


 


HCO3/H2CO3 Ratio   


 


ABG pH   


 


ABG pCO2   


 


ABG pO2   


 


ABG HCO3   


 


ABG O2 Saturation   


 


ABG Base Excess   


 


VBG pH   


 


VBG pCO2   


 


VBG HCO3   


 


VBG Base Excess   


 


FiO2   


 


Sodium  131.9 L   133.7 L


 


Potassium  6.2 H* D   6.2 H*


 


Chloride  99   106


 


Carbon Dioxide  10 L*   14 L


 


Anion Gap  23 H   14


 


BUN  128 H   120 H


 


Creatinine  11.22 H   10.35 H


 


Est GFR ( Amer)  4 L   5 L


 


Glucose  88   63 L


 


Lactic Acid   


 


Calcium  9.5   8.3 L


 


Phosphorus   


 


Magnesium   


 


Total Bilirubin   


 


AST   


 


Alkaline Phosphatase   


 


Ammonia   


 


Total Protein   


 


Albumin   


 


Urine Color   


 


Urine Appearance   


 


Urine pH   


 


Ur Specific Gravity   


 


Urine Protein   


 


Urine Glucose (UA)   


 


Urine Ketones   


 


Urine Blood   


 


Urine Nitrite   


 


Ur Leukocyte Esterase   


 


Urine WBC (Auto)   


 


Urine RBC (Auto)   


 


Blood Type   


 


Antibody Screen   














  03/15/20 03/15/20 03/15/20





  08:05 10:05 10:55


 


WBC   


 


RBC   


 


Hgb   


 


Hct   


 


MCV   


 


MCH   


 


MCHC   


 


RDW   


 


Plt Count   


 


Seg Neutrophils %   


 


Carbonic Acid  0.89 L  


 


HCO3/H2CO3 Ratio  12:1  


 


ABG pH  7.17 L*  


 


ABG pCO2  29.7 L  


 


ABG pO2  88.9  


 


ABG HCO3  10.7 L  


 


ABG O2 Saturation  94.6  


 


ABG Base Excess  -16.4  


 


VBG pH   


 


VBG pCO2   


 


VBG HCO3   


 


VBG Base Excess   


 


FiO2  ROOM AIR  


 


Sodium   134.5 L 


 


Potassium   6.2 H* 


 


Chloride   109 H 


 


Carbon Dioxide   11 L 


 


Anion Gap   15 


 


BUN   113 H 


 


Creatinine   9.56 H 


 


Est GFR (African Amer)   5 L 


 


Glucose   95 


 


Lactic Acid   


 


Calcium   8.1 L 


 


Phosphorus   


 


Magnesium   


 


Total Bilirubin   


 


AST   


 


Alkaline Phosphatase   


 


Ammonia    13.9


 


Total Protein   


 


Albumin   


 


Urine Color   


 


Urine Appearance   


 


Urine pH   


 


Ur Specific Gravity   


 


Urine Protein   


 


Urine Glucose (UA)   


 


Urine Ketones   


 


Urine Blood   


 


Urine Nitrite   


 


Ur Leukocyte Esterase   


 


Urine WBC (Auto)   


 


Urine RBC (Auto)   


 


Blood Type   


 


Antibody Screen   














  03/15/20 03/15/20





  10:55 10:55


 


WBC  


 


RBC  


 


Hgb  


 


Hct  


 


MCV  


 


MCH  


 


MCHC  


 


RDW  


 


Plt Count  


 


Seg Neutrophils %  


 


Carbonic Acid  


 


HCO3/H2CO3 Ratio  


 


ABG pH  


 


ABG pCO2  


 


ABG pO2  


 


ABG HCO3  


 


ABG O2 Saturation  


 


ABG Base Excess  


 


VBG pH  


 


VBG pCO2  


 


VBG HCO3  


 


VBG Base Excess  


 


FiO2  


 


Sodium  133.4 L 


 


Potassium  5.6 H 


 


Chloride  107 


 


Carbon Dioxide  11 L 


 


Anion Gap  15 


 


BUN  100 H 


 


Creatinine  9.11 H 


 


Est GFR (African Amer)  5 L 


 


Glucose  146 H 


 


Lactic Acid   < 0.5 L


 


Calcium  7.5 L 


 


Phosphorus  7.1 H 


 


Magnesium  2.8 H D 


 


Total Bilirubin  0.4 


 


AST  20 


 


Alkaline Phosphatase  137 H 


 


Ammonia  


 


Total Protein  5.4 L 


 


Albumin  2.7 L 


 


Urine Color  


 


Urine Appearance  


 


Urine pH  


 


Ur Specific Gravity  


 


Urine Protein  


 


Urine Glucose (UA)  


 


Urine Ketones  


 


Urine Blood  


 


Urine Nitrite  


 


Ur Leukocyte Esterase  


 


Urine WBC (Auto)  


 


Urine RBC (Auto)  


 


Blood Type  


 


Antibody Screen  








                                        











  03/14/20 03/14/20





  17:10 17:10


 


Creatine Kinase  70 


 


Troponin I   0.060


 


NT-Pro-B Natriuret Pep   2600 H











Impressions: 


                                        





Chest X-Ray  03/14/20 17:53


IMPRESSION:  NO ACUTE RADIOGRAPHIC FINDING IN THE CHEST.


 








KUB X-Ray  03/14/20 17:53


IMPRESSION:  Right staghorn calculus.  No acute finding in the abdomen.

## 2020-03-15 NOTE — RADIOLOGY REPORT (SQ)
EXAM DESCRIPTION:  CT ABD/PELVIS NO ORAL OR IV



COMPLETED DATE/TIME:  3/15/2020 12:40 pm



REASON FOR STUDY:  Fasciitis, sepsis



COMPARISON:  3/15/2020 and 1/22/2020



TECHNIQUE:  CT scan of the abdomen and pelvis performed without intravenous or oral contrast. Images 
reviewed with lung, soft tissue, and bone windows. Reconstructed coronal and sagittal MPR images revi
ewed. All images stored on PACS.

All CT scanners at this facility use dose modulation, iterative reconstruction, and/or weight based d
osing when appropriate to reduce radiation dose to as low as reasonably achievable (ALARA).

[facility]: Dose Right  [facility]: CareDose    [facility]: Dose Right    [facility]: Teradose 4D    [facility]: Smart Contatta



RADIATION DOSE:  CT Rad equipment meets quality standard of care and radiation dose reduction techniq
ues were employed. CTDIvol: 17.0 mGy. DLP: 867 mGy-cm.mGy.



LIMITATIONS:  None.



FINDINGS:  LOWER CHEST: No acute pulmonary findings.  Re- demonstration of a large hiatal hernia.

NON-CONTRASTED LIVER, SPLEEN, ADRENALS: Evaluation limited by lack of IV contrast. No identified sign
ificant masses.

PANCREAS: No masses. No peripancreatic inflammatory changes.

GALLBLADDER: Gallstones. No inflammatory changes to suggest cholecystitis.

RIGHT KIDNEY AND URETER: No suspicious masses. Assessment limited by lack of IV contrast.  Grossly st
able attenuation.   Chronic hydronephrosis on the basis of a staghorn calculus.

LEFT KIDNEY AND URETER: No suspicious masses. Assessment limited by lack of IV contrast.   Punctate n
onobstructing nephroliths are seen within the collecting system.   No hydronephrosis or hydroureter.

AORTA AND RETROPERITONEUM: No aneurysm. No retroperitoneal masses or adenopathy.

BOWEL AND PERITONEAL CAVITY: Right lower quadrant ileostomy.  No focal inflammatory changes.

APPENDIX: Normal.

PELVIS, BLADDER, AND ABDOMINAL WALL:The bladder is decompressed with a Patel catheter.  No pelvic mas
ses or lymphadenopathy.

BONES: No significant findings.

OTHER: No other significant finding.



IMPRESSION:  1.  Chronic hydronephrosis on the basis of a right ureteropelvic junction staghorn calcu
nimisha.

2.  Right lower quadrant ileostomy.

3.  Large hiatal hernia.



COMMENT:  Quality ID # [id number]: Final reports with documentation of one or more dose reduction techniques
 (e.g., Automated exposure control, adjustment of the mA and/or kV according to patient size, use of 
iterative reconstruction technique)



TECHNICAL DOCUMENTATION:  JOB ID:  [id number]

 2011 [employer]- All Rights Reserved



Reading location - IP/workstation name: [email or web address]

## 2020-03-15 NOTE — CRITICAL CARE ADMISSION REPORT
HPI


Date:: 03/15/20


Time:: 09:30


Reason for ICU Reason:: Hypotension with acute renal failure and hyperkalemia


HPI: 


SERGEI BAKER is a 64 year old female from a long-term nursing home resident

with a history of colorectal cancer s/p Low anterior resection with ileostomy 

(no Ileoconduit). Has diabetes, GERD, Has also had chemoradiation  under the 

care of Dr. Shelley.  Recently discharged 2020 for treatment of abdominal

wall cellulitis and persistent ostomy leakage.  She presented with altered 

mental status with persistent ostomy leakage, gross pyuria with hypotension, 

bradycardia. She was found to be in acute renal failure with a creatinine of 

12.7, potassium 8.1 with peak T waves and severe metabolic acidosis.  


She received empiric antibiotics and was referred to the hospitalist for 

admission when it was ascertained that she was a DNR/DNI and transitioning to 

comfort care. Critical care team was notified and she was felt to not be a 

candidate given her comfort care status.   Her CODE STATUS was verified by 

portable DNR sheet.  





She was admitted to the hospitalist service and received approximately 6 L of 

sterile sodium chloride.  With a slight improvement in her blood pressure she 

stated that she wanted everything done.


Her creatinine improved slightly to 10 but she became increasingly hypotensive 

requiring dopamine on the floor.  Because her blood pressures were when she was 

in renal failure the hospitalist requested that she be transferred to a facility

that could perform CVVH.  She was accepted to [facility] and however her blood press

ures required Levophed and she was transferred to the ICU.  She was to be 

maintained here until transfer however her blood pressures were labile and I was

asked to intervene.  The patient's mental status was very lethargic and unable 

to get a distinct and sustained history from her.


I did receive information from the hospitalist service and review the EMR.


She was noted to have dark tarry ileostomy output.  Reviewed her surgical 

history and the patient had a total colectomy with a ileostomy placement in 

2020.  This was not a ureteral diversion procedure.  There was original 

discussion that this was an ileal conduit however that is not the case.


Notably, there are gram-negative's in her urine and she was given a stat dose of

gentamicin.  She is hypothermic and meets criteria for sepsis with shock.  She 

has persistent leakage of her ileostomy with erythema of her skin.  Examination 

is not consistent with necrotizing disease however antibiotics were broadened to

cover the potential for this.  She has a subcutaneous Mediport in the right side

and this is been accessed for vasopressor therapy.  An arterial catheter was 

placed in the ICU presentation.





- Diagnosis/Plan


(1) Sepsis with acute organ dysfunction and septic shock


Qualifiers: 


   Sepsis type: sepsis due to unspecified organism   Severe sepsis acute organ 

dysfunction type: acute renal failure   Acute renal failure type: with acute 

renal cortical necrosis   Qualified Code(s): A41.9 - Sepsis, unspecified 

organism; R65.21 - Severe sepsis with septic shock; N17.1 - Acute kidney failure

with acute cortical necrosis   


Is this a current diagnosis for this admission?: Yes   





(2) Acute renal failure


Qualifiers: 


   Acute renal failure type: with acute renal cortical necrosis   Qualified 

Code(s): N17.1 - Acute kidney failure with acute cortical necrosis   


Is this a current diagnosis for this admission?: Yes   





(3) Acute metabolic encephalopathy


Is this a current diagnosis for this admission?: Yes   





(4) Hyperkalemia, diminished renal excretion


Is this a current diagnosis for this admission?: Yes   





(5) Lactic acidosis


Is this a current diagnosis for this admission?: Yes   





(6) Colorectal carcinoma


Is this a current diagnosis for this admission?: Yes   





- .


Plan Summary: 





Patient has obvious sepsis with organ dysfunction present.  She also has 

encephalopathy related to the metabolic effects of sepsis.


Examination of her abdomen shows erythema but there is no distinct findings 

suspicious for necrotizing fasciitis.  That being said we may be in the early 

development in phase of this and have ordered broad-spectrum antibiotics 

including and concentrated dose of IV gentamicin.  He has gram-negative growing 

in her urine and I am suspicious given time we will have positive blood 

cultures.


At this point, this appears to be stage IV metastatic colorectal cancer however 

I do not have consultation from oncology to confirm this.  The assumption is 

based on a PET scan done recently which showed regional lymph node uptake.  

Patient originally was DNR/DNI with comfort care but had rescinded this this 

morning prior to her transfer to the ICU.  Her current encephalopathic state 

would not allow for a discussion about her care.  No family available or present

to discuss as well.


Her hyperkalemia is persistent as well as her acidosis.  Her creatinine has had 

some improvement on repeat examination.  In most situations we may be able to 

maintain her here without the need for renal replacement therapy.  However 

despite therapy, her hyperkalemia and low pH persists.  I am concerned that we 

are reaching a critical situation which will involve the need for CRRT.  Shay jaquez this hospital does not provide for this and she will require transfer.


Keen judicious evaluation of the abdomen will need to be continued and I have 

ordered a stat CT of the abdomen and pelvis which will notably need to be done 

without contrast. 


The hospitalist service had already discussed the case with Dr. WEISS [facility]  He has excepted the patient and we have updated him on 

the patient's status.  The CT scan findings were not available at the time of 

our discussion.











Past Medical History


Cardiac Medical History: Reports: Myocardial Infarction - unsure-saw 

cardiologist  in [facility]


   Denies: Atrial Fibrillation, Congestive Heart Failure, Coronary Artery 

Disease, Hyperlipidema, Peripheral Vascular Disease, Pulmonary Embolism, Heart 

Murmur


Pulmonary Medical History: Reports: Asthma, Bronchitis, Chronic Obstructive 

Pulmonary Disease (COPD), Pneumonia - 


   Denies: Respiratory Failure, Sleep Apnea, Tuberculosis


Neurological Medical History: 


   Denies: Seizures


Endocrine Medical History: Reports: Hyperthyroidism - liquid radiation


   Denies: Hypothyroidism


Malignancy Medical History: Reports: Colorectal Cancer


   Denies: Leukemia, Lung Cancer


GI Medical History: Reports: Gastroesophageal Reflux Disease


   Denies: Crohn's Disease, Hepatitis, Hiatal Hernia


Musculoskeltal Medical History: Reports: Arthritis


   Denies: Fibromyalgia


Psychiatric Medical History: Reports: Depression


   Denies: Bipolar Disorder, Dementia, Post Traumatic Stress Disorder


Hematology: Reports: Anemia


   Denies: Hemophilia, Sickle Cell Disease


Infectious Medical History: 


   Denies: HIV





Past Surgical History


Past Surgical History: Reports: Ileostomy - With Low anterior resection and 

total colectomy-mesal-rectal, Tubal Ligation, Other - Tumor removal of the 

roat which was benign


   Denies: Amputation, Appendectomy,  Section, Cholecystectomy, Colostom

y, Coronary Artery Bypass Graft, Gastric Bypass Surgery, Herniorrhaphy, 

Hysterectomy, Mastectomy, Pacemaker, Tonsillectomy





Social/Family History





- Social History


Lives with: Nursing Home


Smoking Status: Unknown if Ever Smoked


Frequency of Alcohol Use: None


Hx Recreational Drug Use: No


Drugs: None


Hx Prescription Drug Abuse: No





- Medication/Allergies


Home Medications: 








Levothyroxine Sodium 125 mcg PO QAM 12 


Oxybutynin Chloride [Ditropan 5 mg Tablet] 5 mg PO TID #0 12 


Ramipril [Altace 5 mg Capsule] 5 mg PO DAILY #0 12 


Pravastatin Sodium [Pravachol] 20 mg PO QHS 16 


Albuterol Sulfate [Ventolin 0.042% Neb 1.25 mg/3 mL Ampul] 1 vial NEB Q6HP PRN 

18 


Buspirone HCl [Buspar 15 mg Tablet] 15 mg PO TID 18 


Omeprazole 20 mg PO DAILY 18 


Sertraline HCl [Zoloft] 150 mg PO DAILY 19 


Atenolol [Tenormin] 25 mg PO QAM 20 


Hydroxyzine Pamoate [Vistaril 25 mg Capsule] 25 mg PO Q8HP PRN 20 


Magnesium Oxide [Mag-Ox 400 mg Tablet] 800 mg PO BID  tablet 20 


Sucralfate [Carafate 1 gm Tablet] 1 gm PO ACHS  tablet 20 


Acetaminophen [Tylenol 325 mg Tablet] 650 mg PO Q4HP PRN 03/15/20 


Aspirin [Aspirin 81 mg Chewable Tablet] 81 mg PO DAILY 03/15/20 


Lisinopril 20 mg PO DAILY 03/15/20 








Allergies/Adverse Reactions: 


                                        





shellfish derived Allergy (Severe, Verified 20 02:17)


   Hives


iodine [Iodine] Allergy (Intermediate, Verified 20 02:17)


   Rash


Sulfa (Sulfonamide Antibiotics) Allergy (Intermediate, Verified 20 02:17)


   Hives


Iodinated Contrast Media Adverse Reaction (Intermediate, Verified 20 

02:17)


   Nausea











Review of Systems


ROS unobtainable: Due to mental status, Other - Did receive information from the

 hospitalist service and records





Physical Exam


Vital Signs: 


                                        











Temp Pulse Resp BP Pulse Ox


 


 94.6 F L  93   18   94/56 L  98 


 


 03/15/20 10:00  03/15/20 10:00  03/15/20 10:00  03/15/20 10:00  03/15/20 10:00








                                 Intake & Output











 03/14/20 03/15/20 03/16/20





 06:59 06:59 06:59


 


Intake Total  4302 


 


Output Total  62 0


 


Balance  4240 


 


Weight  79.3 kg 








                                  Weight/Height





Weight                           79.3 kg


Height                           5 ft 4 in








General appearance: PRESENT: disheveled, obese


Exam: 





Older appearing non-toxic but critically ill hearing 64-year-old female.  She is

 in moderate pain and distress when moved and during examination of her 

ileostomy site.  Mentation is lethargic with ability to arouse with stimulus.


Head exam: PRESENT: atraumatic, normocephalic


Eye exam: PRESENT: conjunctiva pink, PERRLA.  ABSENT: conjunctival injection, 

conjunctiva pale, nystagmus, scleral icterus


Mouth exam: PRESENT: dry mucosa


Teeth exam: PRESENT: edentulous


Neck exam: ABSENT: carotid bruit, JVD, lymphadenopathy


Respiratory exam: PRESENT: clear to auscultation susan.  ABSENT: accessory muscle 

use, rales, rhonchi, wheezes


Cardiovascular exam: PRESENT: RRR, +S1, +S2


Pulses: ABSENT: normal dorsalis pedis pul


Vascular exam: ABSENT: normal capillary refill, pallor


GI/Abdominal exam: PRESENT: diminished bowel sounds, tenderness, other - Ileosto

my region is erythematous there is superficial skin tenderness and erythema.  

Subcutaneous tissues do not feel boggy or fluctuant..  ABSENT: ascites, 

distended, firm, guarding, rebound


Rectal exam: PRESENT: deferred, other - No sacral decubiti, no rectal mass noted


Gentrourinary exam: PRESENT: indwelling catheter.  ABSENT: erythema, lesions


Musculoskeletal exam: ABSENT: deformity, dislocation


Neurological exam: PRESENT: altered - GCS: 3-4-6.  No focal motor deficits.


Psychiatric exam: PRESENT: flat affect


Skin exam: PRESENT: dry, erythema - to skin around ileostomy.  ABSENT: cyanosis,

 mottled, pallor, petechiae, urticaria, vesicles


Tubes/Lines: PRESENT: Other - Urinary catheter. Subcutaneous port





Laboratory/Radiographs


Laboratory Results: 


                                        





                                 03/15/20 04:49 





                                 03/15/20 10:05 





                                        











  20





  17:10 17:10 17:10


 


WBC  16.6 H  


 


RBC  4.32  


 


Hgb  13.1  


 


Hct  38.5  


 


MCV  89  


 


MCH  30.4  


 


MCHC  34.1  


 


RDW  16.4 H  


 


Plt Count  502 H  


 


Seg Neutrophils %  Not Reportable  


 


Carbonic Acid   


 


HCO3/H2CO3 Ratio   


 


ABG pH   


 


ABG pCO2   


 


ABG pO2   


 


ABG HCO3   


 


ABG O2 Saturation   


 


ABG Base Excess   


 


VBG pH    7.16 L*


 


VBG pCO2    35.5


 


VBG HCO3    12.4 L


 


VBG Base Excess    -15.3


 


FiO2   


 


Sodium   129.2 L 


 


Potassium   8.1 H* 


 


Chloride   91 L 


 


Carbon Dioxide   10 L* 


 


Anion Gap   28 H 


 


BUN   136 H 


 


Creatinine   12.73 H 


 


Est GFR (African Amer)   4 L 


 


Glucose   123 H 


 


Lactic Acid   


 


Calcium   10.9 H 


 


Magnesium   


 


Total Bilirubin   0.7 


 


AST   23 


 


Alkaline Phosphatase   257 H 


 


Total Protein   8.5 H 


 


Albumin   4.8 


 


Urine Color   


 


Urine Appearance   


 


Urine pH   


 


Ur Specific Gravity   


 


Urine Protein   


 


Urine Glucose (UA)   


 


Urine Ketones   


 


Urine Blood   


 


Urine Nitrite   


 


Ur Leukocyte Esterase   


 


Urine WBC (Auto)   


 


Urine RBC (Auto)   


 


Blood Type   


 


Antibody Screen   














  20





  17:10 17:10 17:10


 


WBC   


 


RBC   


 


Hgb   


 


Hct   


 


MCV   


 


MCH   


 


MCHC   


 


RDW   


 


Plt Count   


 


Seg Neutrophils %   


 


Carbonic Acid   


 


HCO3/H2CO3 Ratio   


 


ABG pH   


 


ABG pCO2   


 


ABG pO2   


 


ABG HCO3   


 


ABG O2 Saturation   


 


ABG Base Excess   


 


VBG pH   


 


VBG pCO2   


 


VBG HCO3   


 


VBG Base Excess   


 


FiO2   


 


Sodium   


 


Potassium   


 


Chloride   


 


Carbon Dioxide   


 


Anion Gap   


 


BUN   


 


Creatinine   


 


Est GFR (African Amer)   


 


Glucose   


 


Lactic Acid  1.2  


 


Calcium   


 


Magnesium    4.4 H*


 


Total Bilirubin   


 


AST   


 


Alkaline Phosphatase   


 


Total Protein   


 


Albumin   


 


Urine Color   BROWN 


 


Urine Appearance   TURBID 


 


Urine pH   5.0 


 


Ur Specific Gravity   1.019 


 


Urine Protein   100 H 


 


Urine Glucose (UA)   NEGATIVE 


 


Urine Ketones   TRACE H 


 


Urine Blood   MODERATE H 


 


Urine Nitrite   NEGATIVE 


 


Ur Leukocyte Esterase   MODERATE H 


 


Urine WBC (Auto)   >182 


 


Urine RBC (Auto)   59 


 


Blood Type   


 


Antibody Screen   














  20





  17:45 20:15 23:25


 


WBC   


 


RBC   


 


Hgb   


 


Hct   


 


MCV   


 


MCH   


 


MCHC   


 


RDW   


 


Plt Count   


 


Seg Neutrophils %   


 


Carbonic Acid   


 


HCO3/H2CO3 Ratio   


 


ABG pH   


 


ABG pCO2   


 


ABG pO2   


 


ABG HCO3   


 


ABG O2 Saturation   


 


ABG Base Excess   


 


VBG pH   


 


VBG pCO2   


 


VBG HCO3   


 


VBG Base Excess   


 


FiO2   


 


Sodium   


 


Potassium   


 


Chloride   


 


Carbon Dioxide   


 


Anion Gap   


 


BUN   


 


Creatinine   


 


Est GFR (African Amer)   


 


Glucose   


 


Lactic Acid   2.3 H  1.0


 


Calcium   


 


Magnesium   


 


Total Bilirubin   


 


AST   


 


Alkaline Phosphatase   


 


Total Protein   


 


Albumin   


 


Urine Color   


 


Urine Appearance   


 


Urine pH   


 


Ur Specific Gravity   


 


Urine Protein   


 


Urine Glucose (UA)   


 


Urine Ketones   


 


Urine Blood   


 


Urine Nitrite   


 


Ur Leukocyte Esterase   


 


Urine WBC (Auto)   


 


Urine RBC (Auto)   


 


Blood Type  B POSITIVE  


 


Antibody Screen  NEGATIVE  














  03/14/20 03/15/20 03/15/20





  23:25 04:49 04:49


 


WBC   13.0 H 


 


RBC   3.09 L 


 


Hgb   9.0 L D 


 


Hct   28.0 L 


 


MCV   91 


 


MCH   29.2 


 


MCHC   32.3 


 


RDW   16.3 H 


 


Plt Count   333 


 


Seg Neutrophils %   Not Reportable 


 


Carbonic Acid   


 


HCO3/H2CO3 Ratio   


 


ABG pH   


 


ABG pCO2   


 


ABG pO2   


 


ABG HCO3   


 


ABG O2 Saturation   


 


ABG Base Excess   


 


VBG pH   


 


VBG pCO2   


 


VBG HCO3   


 


VBG Base Excess   


 


FiO2   


 


Sodium  131.9 L   133.7 L


 


Potassium  6.2 H* D   6.2 H*


 


Chloride  99   106


 


Carbon Dioxide  10 L*   14 L


 


Anion Gap  23 H   14


 


BUN  128 H   120 H


 


Creatinine  11.22 H   10.35 H


 


Est GFR ( Amer)  4 L   5 L


 


Glucose  88   63 L


 


Lactic Acid   


 


Calcium  9.5   8.3 L


 


Magnesium   


 


Total Bilirubin   


 


AST   


 


Alkaline Phosphatase   


 


Total Protein   


 


Albumin   


 


Urine Color   


 


Urine Appearance   


 


Urine pH   


 


Ur Specific Gravity   


 


Urine Protein   


 


Urine Glucose (UA)   


 


Urine Ketones   


 


Urine Blood   


 


Urine Nitrite   


 


Ur Leukocyte Esterase   


 


Urine WBC (Auto)   


 


Urine RBC (Auto)   


 


Blood Type   


 


Antibody Screen   














  03/15/20 03/15/20





  08:05 10:05


 


WBC  


 


RBC  


 


Hgb  


 


Hct  


 


MCV  


 


MCH  


 


MCHC  


 


RDW  


 


Plt Count  


 


Seg Neutrophils %  


 


Carbonic Acid  0.89 L 


 


HCO3/H2CO3 Ratio  12:1 


 


ABG pH  7.17 L* 


 


ABG pCO2  29.7 L 


 


ABG pO2  88.9 


 


ABG HCO3  10.7 L 


 


ABG O2 Saturation  94.6 


 


ABG Base Excess  -16.4 


 


VBG pH  


 


VBG pCO2  


 


VBG HCO3  


 


VBG Base Excess  


 


FiO2  ROOM AIR 


 


Sodium   134.5 L


 


Potassium   6.2 H*


 


Chloride   109 H


 


Carbon Dioxide   11 L


 


Anion Gap   15


 


BUN   113 H


 


Creatinine   9.56 H


 


Est GFR (African Amer)   5 L


 


Glucose   95


 


Lactic Acid  


 


Calcium   8.1 L


 


Magnesium  


 


Total Bilirubin  


 


AST  


 


Alkaline Phosphatase  


 


Total Protein  


 


Albumin  


 


Urine Color  


 


Urine Appearance  


 


Urine pH  


 


Ur Specific Gravity  


 


Urine Protein  


 


Urine Glucose (UA)  


 


Urine Ketones  


 


Urine Blood  


 


Urine Nitrite  


 


Ur Leukocyte Esterase  


 


Urine WBC (Auto)  


 


Urine RBC (Auto)  


 


Blood Type  


 


Antibody Screen  








                                        











  20





  17:10 17:10


 


Creatine Kinase  70 


 


Troponin I   0.060


 


NT-Pro-B Natriuret Pep   2600 H











Impressions: 


                                        





Chest X-Ray  20 17:53


IMPRESSION:  NO ACUTE RADIOGRAPHIC FINDING IN THE CHEST.


 








KUB X-Ray  20 17:53


IMPRESSION:  Right staghorn calculus.  No acute finding in the abdomen.


 











All labs, radiographs, diagnostic studies and EKGs were personally reviewed: Yes


In addition, reports of radiographic and diagnostic studies were read: Yes





Critical Time


Critical Time (minutes): 90


-: 


The care of a critically ill patient is dynamic.  This note represents a static 

moment in the admission process. Orders and treatments may be given 

simultaneously and urgently, and time is not representative of the treatment 

process.





This patient requires Critical Care secondary to life threatening organ or limb 

dysfunction.  Without Critical Care services, the patient is at risk for 

increased mortality and morbidity.

## 2020-03-16 NOTE — EKG REPORT
SEVERITY:- ABNORMAL ECG -

SINUS RHYTHM

ATRIAL PREMATURE COMPLEX

NONSPECIFIC IVCD WITH LAD

LEFT VENTRICULAR HYPERTROPHY

INFERIOR INFARCT, AGE INDETERMINATE

:

Confirmed by: Indira Kapoor 16-Mar-2020 00:31:13

## 2020-04-06 ENCOUNTER — HOSPITAL ENCOUNTER (INPATIENT)
Dept: HOSPITAL 62 - ER | Age: 65
LOS: 5 days | Discharge: HOME | DRG: 682 | End: 2020-04-11
Attending: INTERNAL MEDICINE | Admitting: FAMILY MEDICINE
Payer: MEDICAID

## 2020-04-06 DIAGNOSIS — Z79.899: ICD-10-CM

## 2020-04-06 DIAGNOSIS — E87.6: ICD-10-CM

## 2020-04-06 DIAGNOSIS — I95.89: ICD-10-CM

## 2020-04-06 DIAGNOSIS — E21.3: ICD-10-CM

## 2020-04-06 DIAGNOSIS — N17.9: Primary | ICD-10-CM

## 2020-04-06 DIAGNOSIS — E87.5: ICD-10-CM

## 2020-04-06 DIAGNOSIS — E83.51: ICD-10-CM
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
## 2020-04-06 DIAGNOSIS — Z88.2: ICD-10-CM

## 2020-04-06 DIAGNOSIS — K94.11: ICD-10-CM

## 2020-04-06 DIAGNOSIS — J44.9: ICD-10-CM

## 2020-04-06 DIAGNOSIS — Z92.21: ICD-10-CM

## 2020-04-06 DIAGNOSIS — K21.0: ICD-10-CM

## 2020-04-06 DIAGNOSIS — Z88.8: ICD-10-CM

## 2020-04-06 DIAGNOSIS — Z87.891: ICD-10-CM

## 2020-04-06 DIAGNOSIS — E87.2: ICD-10-CM

## 2020-04-06 DIAGNOSIS — I25.2: ICD-10-CM

## 2020-04-06 DIAGNOSIS — E86.0: ICD-10-CM

## 2020-04-06 DIAGNOSIS — N20.0: ICD-10-CM

## 2020-04-06 DIAGNOSIS — Z60.2: ICD-10-CM

## 2020-04-06 DIAGNOSIS — E89.0: ICD-10-CM

## 2020-04-06 DIAGNOSIS — Z91.041: ICD-10-CM

## 2020-04-06 DIAGNOSIS — D63.0: ICD-10-CM

## 2020-04-06 DIAGNOSIS — G93.41: ICD-10-CM

## 2020-04-06 DIAGNOSIS — E46: ICD-10-CM

## 2020-04-06 DIAGNOSIS — C19: ICD-10-CM

## 2020-04-06 DIAGNOSIS — D50.0: ICD-10-CM

## 2020-04-06 DIAGNOSIS — Z91.013: ICD-10-CM

## 2020-04-06 DIAGNOSIS — Z66: ICD-10-CM

## 2020-04-06 DIAGNOSIS — F32.9: ICD-10-CM

## 2020-04-06 DIAGNOSIS — E83.42: ICD-10-CM

## 2020-04-06 DIAGNOSIS — N18.9: ICD-10-CM

## 2020-04-06 DIAGNOSIS — R57.1: ICD-10-CM

## 2020-04-06 LAB
ADD MANUAL DIFF: NO
ALBUMIN SERPL-MCNC: 3.3 G/DL (ref 3.5–5)
ALP SERPL-CCNC: 83 U/L (ref 38–126)
ANION GAP SERPL CALC-SCNC: 14 MMOL/L (ref 5–19)
APPEARANCE UR: (no result)
APTT BLD: 29.7 SEC (ref 23.5–35.8)
APTT PPP: YELLOW S
AST SERPL-CCNC: 16 U/L (ref 14–36)
BASOPHILS # BLD AUTO: 0.1 10^3/UL (ref 0–0.2)
BASOPHILS NFR BLD AUTO: 0.9 % (ref 0–2)
BILIRUB DIRECT SERPL-MCNC: 0 MG/DL (ref 0–0.4)
BILIRUB SERPL-MCNC: 0.2 MG/DL (ref 0.2–1.3)
BILIRUB UR QL STRIP: NEGATIVE
BUN SERPL-MCNC: 110 MG/DL (ref 7–20)
CALCIUM: 7.4 MG/DL (ref 8.4–10.2)
CHLORIDE SERPL-SCNC: 110 MMOL/L (ref 98–107)
CK MB SERPL-MCNC: 8.47 NG/ML (ref ?–4.55)
CO2 SERPL-SCNC: 11 MMOL/L (ref 22–30)
EOSINOPHIL # BLD AUTO: 0.1 10^3/UL (ref 0–0.6)
EOSINOPHIL NFR BLD AUTO: 0.8 % (ref 0–6)
ERYTHROCYTE [DISTWIDTH] IN BLOOD BY AUTOMATED COUNT: 19.1 % (ref 11.5–14)
GLUCOSE SERPL-MCNC: 73 MG/DL (ref 75–110)
GLUCOSE UR STRIP-MCNC: NEGATIVE MG/DL
HCT VFR BLD CALC: 25.4 % (ref 36–47)
HGB BLD-MCNC: 8.5 G/DL (ref 12–15.5)
INR PPP: 1.16
KETONES UR STRIP-MCNC: NEGATIVE MG/DL
LYMPHOCYTES # BLD AUTO: 0.6 10^3/UL (ref 0.5–4.7)
LYMPHOCYTES NFR BLD AUTO: 7.7 % (ref 13–45)
MCH RBC QN AUTO: 30.9 PG (ref 27–33.4)
MCHC RBC AUTO-ENTMCNC: 33.4 G/DL (ref 32–36)
MCV RBC AUTO: 92 FL (ref 80–97)
MONOCYTES # BLD AUTO: 0.9 10^3/UL (ref 0.1–1.4)
MONOCYTES NFR BLD AUTO: 12 % (ref 3–13)
NEUTROPHILS # BLD AUTO: 5.9 10^3/UL (ref 1.7–8.2)
NEUTS SEG NFR BLD AUTO: 78.6 % (ref 42–78)
NITRITE UR QL STRIP: NEGATIVE
NT PRO BNP: 2050 PG/ML (ref ?–125)
PH UR STRIP: 5 [PH] (ref 5–9)
PLATELET # BLD: 370 10^3/UL (ref 150–450)
POTASSIUM SERPL-SCNC: 6.7 MMOL/L (ref 3.6–5)
PROT SERPL-MCNC: 6.1 G/DL (ref 6.3–8.2)
PROT UR STRIP-MCNC: 100 MG/DL
PROTHROMBIN TIME: 14.9 SEC (ref 11.4–15.4)
RBC # BLD AUTO: 2.75 10^6/UL (ref 3.72–5.28)
SP GR UR STRIP: 1.02
TOTAL CELLS COUNTED % (AUTO): 100 %
TROPONIN I SERPL-MCNC: 0.08 NG/ML
UROBILINOGEN UR-MCNC: NEGATIVE MG/DL (ref ?–2)
WBC # BLD AUTO: 7.5 10^3/UL (ref 4–10.5)

## 2020-04-06 PROCEDURE — 82270 OCCULT BLOOD FECES: CPT

## 2020-04-06 PROCEDURE — 99292 CRITICAL CARE ADDL 30 MIN: CPT

## 2020-04-06 PROCEDURE — 83605 ASSAY OF LACTIC ACID: CPT

## 2020-04-06 PROCEDURE — 87324 CLOSTRIDIUM AG IA: CPT

## 2020-04-06 PROCEDURE — 82553 CREATINE MB FRACTION: CPT

## 2020-04-06 PROCEDURE — 84481 FREE ASSAY (FT-3): CPT

## 2020-04-06 PROCEDURE — 83550 IRON BINDING TEST: CPT

## 2020-04-06 PROCEDURE — 82040 ASSAY OF SERUM ALBUMIN: CPT

## 2020-04-06 PROCEDURE — 81001 URINALYSIS AUTO W/SCOPE: CPT

## 2020-04-06 PROCEDURE — 85730 THROMBOPLASTIN TIME PARTIAL: CPT

## 2020-04-06 PROCEDURE — 82746 ASSAY OF FOLIC ACID SERUM: CPT

## 2020-04-06 PROCEDURE — 85025 COMPLETE CBC W/AUTO DIFF WBC: CPT

## 2020-04-06 PROCEDURE — 84443 ASSAY THYROID STIM HORMONE: CPT

## 2020-04-06 PROCEDURE — 80048 BASIC METABOLIC PNL TOTAL CA: CPT

## 2020-04-06 PROCEDURE — 96375 TX/PRO/DX INJ NEW DRUG ADDON: CPT

## 2020-04-06 PROCEDURE — 85610 PROTHROMBIN TIME: CPT

## 2020-04-06 PROCEDURE — 84484 ASSAY OF TROPONIN QUANT: CPT

## 2020-04-06 PROCEDURE — 74176 CT ABD & PELVIS W/O CONTRAST: CPT

## 2020-04-06 PROCEDURE — 80053 COMPREHEN METABOLIC PANEL: CPT

## 2020-04-06 PROCEDURE — 87040 BLOOD CULTURE FOR BACTERIA: CPT

## 2020-04-06 PROCEDURE — 82550 ASSAY OF CK (CPK): CPT

## 2020-04-06 PROCEDURE — 86900 BLOOD TYPING SEROLOGIC ABO: CPT

## 2020-04-06 PROCEDURE — 82803 BLOOD GASES ANY COMBINATION: CPT

## 2020-04-06 PROCEDURE — 85027 COMPLETE CBC AUTOMATED: CPT

## 2020-04-06 PROCEDURE — 93010 ELECTROCARDIOGRAM REPORT: CPT

## 2020-04-06 PROCEDURE — 86920 COMPATIBILITY TEST SPIN: CPT

## 2020-04-06 PROCEDURE — 82533 TOTAL CORTISOL: CPT

## 2020-04-06 PROCEDURE — 82728 ASSAY OF FERRITIN: CPT

## 2020-04-06 PROCEDURE — 94799 UNLISTED PULMONARY SVC/PX: CPT

## 2020-04-06 PROCEDURE — 87086 URINE CULTURE/COLONY COUNT: CPT

## 2020-04-06 PROCEDURE — 82962 GLUCOSE BLOOD TEST: CPT

## 2020-04-06 PROCEDURE — 96374 THER/PROPH/DIAG INJ IV PUSH: CPT

## 2020-04-06 PROCEDURE — 83880 ASSAY OF NATRIURETIC PEPTIDE: CPT

## 2020-04-06 PROCEDURE — 82607 VITAMIN B-12: CPT

## 2020-04-06 PROCEDURE — 85045 AUTOMATED RETICULOCYTE COUNT: CPT

## 2020-04-06 PROCEDURE — 74022 RADEX COMPL AQT ABD SERIES: CPT

## 2020-04-06 PROCEDURE — 96361 HYDRATE IV INFUSION ADD-ON: CPT

## 2020-04-06 PROCEDURE — 83540 ASSAY OF IRON: CPT

## 2020-04-06 PROCEDURE — 36430 TRANSFUSION BLD/BLD COMPNT: CPT

## 2020-04-06 PROCEDURE — 36415 COLL VENOUS BLD VENIPUNCTURE: CPT

## 2020-04-06 PROCEDURE — 93005 ELECTROCARDIOGRAM TRACING: CPT

## 2020-04-06 PROCEDURE — 87449 NOS EACH ORGANISM AG IA: CPT

## 2020-04-06 PROCEDURE — 99291 CRITICAL CARE FIRST HOUR: CPT

## 2020-04-06 PROCEDURE — P9016 RBC LEUKOCYTES REDUCED: HCPCS

## 2020-04-06 PROCEDURE — 86850 RBC ANTIBODY SCREEN: CPT

## 2020-04-06 PROCEDURE — 86901 BLOOD TYPING SEROLOGIC RH(D): CPT

## 2020-04-06 PROCEDURE — 83735 ASSAY OF MAGNESIUM: CPT

## 2020-04-06 RX ADMIN — DEXAMETHASONE SODIUM PHOSPHATE PRN MG: 10 INJECTION INTRAMUSCULAR; INTRAVENOUS at 22:40

## 2020-04-06 RX ADMIN — SODIUM CHLORIDE, SODIUM LACTATE, POTASSIUM CHLORIDE, AND CALCIUM CHLORIDE PRN MLS/HR: .6; .31; .03; .02 INJECTION, SOLUTION INTRAVENOUS at 20:21

## 2020-04-06 RX ADMIN — PROMETHAZINE HYDROCHLORIDE PRN MG: 25 INJECTION INTRAMUSCULAR; INTRAVENOUS at 23:54

## 2020-04-06 RX ADMIN — DOPAMINE HYDROCHLORIDE PRN MLS/HR: 320 INJECTION, SOLUTION INTRAVENOUS at 23:35

## 2020-04-06 RX ADMIN — SODIUM CHLORIDE AND POTASSIUM CHLORIDE PRN MLS/HR: 9; 2.98 INJECTION, SOLUTION INTRAVENOUS at 22:35

## 2020-04-06 RX ADMIN — SODIUM CHLORIDE, SODIUM LACTATE, POTASSIUM CHLORIDE, AND CALCIUM CHLORIDE PRN MLS/HR: .6; .31; .03; .02 INJECTION, SOLUTION INTRAVENOUS at 19:12

## 2020-04-06 RX ADMIN — SODIUM CHLORIDE AND POTASSIUM CHLORIDE PRN MLS/HR: 9; 2.98 INJECTION, SOLUTION INTRAVENOUS at 22:30

## 2020-04-06 SDOH — SOCIAL STABILITY - SOCIAL INSECURITY: PROBLEMS RELATED TO LIVING ALONE: Z60.2

## 2020-04-06 NOTE — RADIOLOGY REPORT (SQ)
EXAM DESCRIPTION:  ACUTE ABDOMEN SERIES



IMAGES COMPLETED DATE/TIME:  4/6/2020 6:16 pm



REASON FOR STUDY:  sob weak



COMPARISON:  CT abdomen pelvis 3/15/2020

KUB 3/14/2020, 1/17/2020

AP chest 3/14/2020



NUMBER OF VIEWS:  Three views.



TECHNIQUE:  Frontal chest, supine abdomen and upright abdomen radiographic images acquired.



LIMITATIONS:  None.



FINDINGS:  CHEST: Lungs clear of infiltrates.  No pleural effusion or pneumothorax.  Stable mild card
iomegaly.  Unchanged right jugular central venous catheter.

FREE AIR: None. No abnormal gas collections.

BOWEL GAS PATTERN: Nonobstructive pattern. No dilated loops or air fluid levels.

CALCIFICATIONS: Right-sided staghorn calculus in the renal pelvis, unchanged.  Unchanged right lower 
poles dystrophic cortical calcifications

HARDWARE: None in the abdomen.

SOFT TISSUES: No gross mass or suggestion of organomegaly.

BONES: No acute fracture.  No worrisome bone lesions.

OTHER: No other significant finding.



IMPRESSION:  NO RADIOGRAPHIC EVIDENCE FOR ACUTE ABDOMINAL DISEASE.

UNCHANGED RIGHT STAGHORN CALCULUS.



TECHNICAL DOCUMENTATION:  JOB ID:  [id number]

 [employer]- All Rights Reserved



Reading location - IP/workstation name: [id number]

## 2020-04-06 NOTE — ER DOCUMENT REPORT
ED General





- General


Information source: Patient


TRAVEL OUTSIDE OF THE [place] IN LAST 30 DAYS: No





- HPI


Onset: Just prior to arrival


Onset/Duration: Persistent


Quality of pain: Other - weakness


Severity: Mild


Pain Level: 0


Associated symptoms: Shortness of breath, Weakness


Exacerbated by: Movement, Walking, Deep breathing


Relieved by: Remaining still


Similar symptoms previously: Yes


Recently seen / treated by doctor: Yes - Patient was just released from [facility] 

b/o COPD ;there for 2 weeks





<CARMENCITA QUINN JR - Last Filed: 20 19:41>





<MÓNICA MAY - Last Filed: 20 21:26>





- General


Chief Complaint: Shortness Of Breath


Stated Complaint: BREATHING PROBLEMS


Time Seen by Provider: 20 17:34


Primary Care Provider: 


GEORGIA ELLIS MD [Primary Care Provider] - Follow up as needed


Notes: 





64-year-old  female arrives by EMS with chief complaint of having 

weakness and dark stools per her colostomy bag.  This is been going on for 1 

week.  The last time she was admitted here and [facility] her roommate and her 

mother was called in order to establish DNR status.  Patient wants to be a DNR 

but has no paperwork at this time.  Advised we will try to arrange for this.  

Patient also reports her symptoms of shortness of breath and severe weakness and

paleness occurred around 7 days ago.  She reports she did receive blood while at

[facility] when she was admitted there on  and was discharged on .  

Also patient has been noted to have UTI sepsis ARF hyperkalemia and in January 

diagnosed with rectal cancer.  Patient was made DNR at 1830 at patient request. 

Patient continued to have lower pressure despite 500 bolus of saline per port.  

Patient has a history of COPD tobacco abuse depression anxiety disorder GI bleed

colostomy rectal cancer.  She denies taking any blood pressure medicines today. 

(CARMENCITA QUINN JR)





- Related Data


Allergies/Adverse Reactions: 


                                        





shellfish derived Allergy (Severe, Verified 20 02:17)


   Hives


iodine [Iodine] Allergy (Intermediate, Verified 20 02:17)


   Rash


Sulfa (Sulfonamide Antibiotics) Allergy (Intermediate, Verified 20 02:17)


   Hives


Iodinated Contrast Media Adverse Reaction (Intermediate, Verified 20 

02:17)


   Nausea











Past Medical History





- General


Information source: Patient





- Social History


Smoking Status: Former Smoker - Former smoker 1/2 ppd but stopped many years ago


Cigarette use (# per day): No


Chew tobacco use (# tins/day): No


Smoking Education Provided: No


Frequency of alcohol use: None


Drug Abuse: None


Family History: CAD, DM, Hyperlipidemia, Hypertension





- Past Medical History


Cardiac Medical History: Reports: Hx Heart Attack - unsure-saw cardiologist 

 in [place],  Hypertension


   Denies: Hx Atrial Fibrillation, Hx Congestive Heart Failure, Hx Coronary 

Artery Disease, Hx Hypercholesterolemia, Hx Peripheral Vascular Disease, Hx 

Pulmonary Embolism, Hx Heart Murmur


Pulmonary Medical History: Reports: Hx Asthma, Hx Bronchitis, Hx COPD, Hx 

Pneumonia - 


   Denies: Hx Respiratory Failure, Hx Sleep Apnea, Hx Tuberculosis


Neurological Medical History: Denies: Hx Cerebrovascular Accident, Hx Seizures, 

Hx Parkinson's Disease


Endocrine Medical History: Reports: Hx Hyperthyroidism - liquid radiation.  De

nies: Hx Hypothyroidism


Malignancy Medical History: Reports: Hx Colorectal Cancer.  Denies: Hx Leukemia,

 Hx Lung Cancer


GI Medical History: Reports: Hx Gastroesophageal Reflux Disease.  Denies: Hx 

Crohn's Disease, Hx Hepatitis, Hx Hiatal Hernia, Hx Irritable Bowel, Hx Liver 

Failure, Hx Pancreatitis, Hx Ulcer


Musculoskeletal Medical History: Reports Hx Arthritis, Denies Hx Fibromyalgia, 

Denies Hx Multiple Sclerosis, Denies Hx Muscular Dystrophy, Denies Hx Systemic 

Lupus Erythematosus


Psychiatric Medical History: Reports: Hx Depression


   Denies: Hx Bipolar Disorder, Hx Dementia, Hx Post Traumatic Stress Disorder, 

Hx Schizophrenia


Traumatic Medical History: Denies: Hx Fractures


Infectious Medical History: Denies: Hx Hepatitis, Hx HIV


Past Surgical History: Reports: Hx Abdominal Surgery - COLOSTOMY, Hx Ileostomy -

 With Low anterior resection and total colectomy-mesal-rectal, Hx Tubal 

Ligation, Other - Tumor removal of the throat which was benign.  Denies: Hx 

Appendectomy, Hx Bowel Surgery, Hx  Section, Hx Cholecystectomy, Hx 

Colostomy, Hx Coronary Artery Bypass Graft, Hx Gastric Bypass Surgery, Hx 

Herniorrhaphy, Hx Hysterectomy, Hx Mastectomy, Hx Open Heart Surgery, Hx 

Pacemaker, Hx Tonsillectomy





- Immunizations


Hx Diphtheria, Pertussis, Tetanus Vaccination: Yes - not sure





<CARMENCITA QUINN JR - Last Filed: 20 19:41>





Review of Systems





- Review of Systems


Constitutional: See HPI, Malaise, Weakness, Recent illness


EENT: No symptoms reported


Cardiovascular: See HPI, Dizziness, Lightheaded


Respiratory: See HPI, Short of breath


Gastrointestinal: See HPI, Abdominal pain, Black stools


Genitourinary: No symptoms reported


Female Genitourinary: No symptoms reported


Musculoskeletal: No symptoms reported


Skin: See HPI, Other - Pale skin and conjunctivae


Hematologic/Lymphatic: No symptoms reported


Neurological/Psychological: See HPI, Weakness





<KAICARMENCITA REESE  - Last Filed: 20 19:41>





Physical Exam





- Vital signs


Interpretation: Hypotensive





- HEENT


Head: Normocephalic


Eyes: Normal


Conjunctiva: Normal


Cornea: Normal


Extraocular movements intact: Yes


Eyelashes: Normal


Pupils: PERRL


Pharynx: Normal


Neck: Normal





- Respiratory


Respiratory status: No respiratory distress


Chest status: Nontender


Breath sounds: Normal


Chest palpation: Normal





- Cardiovascular


Rhythm: Regular


Heart sounds: Normal auscultation


Murmur: No


Friction rub: No


Hamman's crunch: No





- Abdominal


Inspection: Other - Colostomy bag


Distension: No distension


Bowel sounds: Hypoactive


Tenderness: Nontender


Organomegaly: No organomegaly





- Genitourinary


External exam: Normal





- Back


Back: Normal





- Extremities


General upper extremity: Normal inspection


General lower extremity: Normal inspection





- Neurological


Neuro grossly intact: Yes


Cognition: Normal


Orientation: AAOx4


[place] Coma Scale Eye Opening: Spontaneous


[place] Coma Scale Verbal: Oriented


[place] Coma Scale Motor: Obeys Commands


[place] Coma Scale Total: 15


Speech: Normal


Cranial nerves: Normal


Cerebellar coordination: Normal


Motor strength normal: LUE, RUE, LLE, RLE





- Psychological


Associated symptoms: Flat affect





- Skin


Skin Temperature: Cool


Skin Moisture: Dry


Skin Color: Pale





<KAICARMENCITA REESE  - Last Filed: 20 19:41>





- Vital signs


Vitals: 





                                        











Temp Pulse Resp BP Pulse Ox


 


 94.3 F L  85   14   60/43 L  92 


 


 20 17:28  20 17:28  20 17:28  20 17:28  20 17:28














Course





- Laboratory


Result Diagrams: 


                                 20 18:10





                                 20 18:10





- Diagnostic Test


Radiology reviewed: Reports reviewed - Right staghorn calculus but otherwise 

radiology reads KUB portable chest as within normal limits





<CARMENCITA QUINN JR - Last Filed: 20 19:41>





- Laboratory


Result Diagrams: 


                                 20 19:30





                                 20 19:30





- Diagnostic Test


Radiology reviewed: Image reviewed, Reports reviewed





- EKG Interpretation by Me


EKG shows normal: Sinus rhythm, Axis, Intervals, QRS Complexes


Rate: Normal


Rhythm: NSR





<MÓNICA MAY - Last Filed: 20 21:26>





- Re-evaluation


Re-evalutation: 





20 21:24


The patient is chronically ill due to colorectal cancer. She has acute renal 

failure with hyperkalemia, a GI bleed, and she is hypotensive in the ER. Patient

 is DNR and she does not want any interventions. The patient patient was treated

 with fluids, calcium gluconate, sodium bicarb, dextrose/insulin and kayexalate 

in the ER. Patient admitted to a medical bed. (MÓNICA MAY)





- Vital Signs


Vital signs: 





                                        











Temp Pulse Resp BP Pulse Ox


 


 97.7 F   85   17   88/68 L  99 


 


 20 19:20  20 17:28  20 20:46  20 20:46  20 20:46














- Laboratory


Laboratory results interpreted by me: 





                                        











  20





  19:30 19:30 19:30


 


RBC  2.75 L  


 


Hgb  8.5 L  


 


Hct  25.4 L  


 


RDW  19.1 H  


 


Lymph % (Auto)  7.7 L  


 


Seg Neutrophils %  78.6 H  


 


Sodium    135.3 L


 


Potassium    6.7 H*


 


Chloride    110 H


 


Carbon Dioxide    11 L


 


BUN    110 H


 


Creatinine    8.84 H


 


Est GFR ( Amer)    5 L


 


Est GFR (MDRD) Non-Af    4 L


 


Glucose    73 L


 


Calcium    7.4 L


 


CK-MB (CK-2)   


 


NT-Pro-B Natriuret Pep   


 


Total Protein    6.1 L


 


Albumin    3.3 L


 


Crossmatch   See Detail 














  20





  19:30


 


RBC 


 


Hgb 


 


Hct 


 


RDW 


 


Lymph % (Auto) 


 


Seg Neutrophils % 


 


Sodium 


 


Potassium 


 


Chloride 


 


Carbon Dioxide 


 


BUN 


 


Creatinine 


 


Est GFR ( Amer) 


 


Est GFR (MDRD) Non-Af 


 


Glucose 


 


Calcium 


 


CK-MB (CK-2)  8.47 H


 


NT-Pro-B Natriuret Pep  2050 H


 


Total Protein 


 


Albumin 


 


Crossmatch 














- EKG Interpretation by Me


Additional EKG results interpreted by me: 





20 21:26


q waves in inferior leads (MÓNICA MAY)





Critical Care Note





- Critical Care Note


Total time excluding time spent on procedures (mins): 90





<CARMENCITA QUINN JR - Last Filed: 20 19:41>





- Critical Care Note


Comments: 





turned over to Robin at 1941 (CARMENCITA QUINN JR)





Discharge





<CARMENCITA QUINN JR - Last Filed: 20 19:41>





- Discharge


Admitting Provider: Weiland (Hospitalist)





<LALOMÓNICA CRISTI - Last Filed: 20 21:26>





- Discharge


Clinical Impression: 


 Colorectal cancer





Hypotension


Qualifiers:


 Hypotension type: other hypotension type Qualified Code(s): I95.89 - Other 

hypotension





Acute kidney failure


Qualifiers:


 Acute renal failure type: unspecified Qualified Code(s): N17.9 - Acute kidney 

failure, unspecified





Anemia


Qualifiers:


 Anemia type: unspecified type Qualified Code(s): D64.9 - Anemia, unspecified





Condition: Poor


Disposition: ADMITTED AS INPATIENT


Referrals: 


GEORGIA ELLIS MD [Primary Care Provider] - Follow up as needed

## 2020-04-06 NOTE — RADIOLOGY REPORT (SQ)
EXAM DESCRIPTION: 



CT abdomen and pelvis without contrast



CLINICAL HISTORY: 



64 years Female, abdominal pain



COMPARISON: 



CT abdomen pelvis 3/15/2020



TECHNIQUE: 



Axial images of the abdomen and pelvis were performed without the

use of intravenous contrast, with sagittal and coronal

reformatted images.



  



This exam was performed according to our departmental

dose-optimization program which includes use of Automated

Exposure Control, adjustment of the mA and/or kV according to

patient size and/or use of iterative reconstruction technique.





FINDINGS: 



There is a partial staghorn calculus in the right kidney, with

severe hydronephrosis involving the mid and lower right kidney.

There is severe associated cortical atrophy. These findings were

also present on the prior scan.



There are small stones in the left kidney, but there is no

hydronephrosis on the left side.



No evidence of bowel obstruction.



There is a small gallstone.



There are postoperative changes in the pelvis.



There is a right lower quadrant ileostomy.



There is no significant radiographic abnormality of the liver,

spleen, pancreas or adrenal glands.



No free air or free fluid.



IMPRESSION: 



No acute finding.



Other findings as described.

## 2020-04-07 LAB
ANION GAP SERPL CALC-SCNC: 14 MMOL/L (ref 5–19)
ARTERIAL BLOOD FIO2: 1.5
ARTERIAL BLOOD H2CO3: 0.92 MMOL/L (ref 1.05–1.35)
ARTERIAL BLOOD HCO3: 15.4 MMOL/L (ref 20–24)
ARTERIAL BLOOD PCO2: 30.4 MMHG (ref 35–45)
ARTERIAL BLOOD PH: 7.32 (ref 7.35–7.45)
ARTERIAL BLOOD PO2: 103.9 MMHG (ref 80–100)
ARTERIAL BLOOD TOTAL CO2: 16.3 MMOL/L (ref 21–25)
BASE EXCESS BLDA CALC-SCNC: -9.5 MMOL/L
BASE EXCESS BLDV CALC-SCNC: -16.2 MMOL/L
BUN SERPL-MCNC: 91 MG/DL (ref 7–20)
CALCIUM: 7.1 MG/DL (ref 8.4–10.2)
CHLORIDE SERPL-SCNC: 116 MMOL/L (ref 98–107)
CO2 SERPL-SCNC: 12 MMOL/L (ref 22–30)
ERYTHROCYTE [DISTWIDTH] IN BLOOD BY AUTOMATED COUNT: 19.3 % (ref 11.5–14)
GLUCOSE SERPL-MCNC: 150 MG/DL (ref 75–110)
HCO3 BLDV-SCNC: 11 MMOL/L (ref 20–32)
HCT VFR BLD CALC: 24.8 % (ref 36–47)
HGB BLD-MCNC: 8.4 G/DL (ref 12–15.5)
MCH RBC QN AUTO: 31.2 PG (ref 27–33.4)
MCHC RBC AUTO-ENTMCNC: 34 G/DL (ref 32–36)
MCV RBC AUTO: 92 FL (ref 80–97)
PCO2 BLDV: 31 MMHG (ref 35–63)
PH BLDV: 7.17 [PH] (ref 7.3–7.42)
PLATELET # BLD: 364 10^3/UL (ref 150–450)
POTASSIUM SERPL-SCNC: 4.6 MMOL/L (ref 3.6–5)
RBC # BLD AUTO: 2.7 10^6/UL (ref 3.72–5.28)
SAO2 % BLDA: 97.5 % (ref 94–98)
T3FREE SERPL-MCNC: 3.42 PG/ML (ref 2.77–5.27)
TSH SERPL-ACNC: 0.42 UIU/ML (ref 0.47–4.68)
WBC # BLD AUTO: 6.9 10^3/UL (ref 4–10.5)

## 2020-04-07 RX ADMIN — SODIUM CHLORIDE AND POTASSIUM CHLORIDE PRN MLS/HR: 9; 2.98 INJECTION, SOLUTION INTRAVENOUS at 01:06

## 2020-04-07 RX ADMIN — SUCRALFATE SCH GM: 1 TABLET ORAL at 16:25

## 2020-04-07 RX ADMIN — SODIUM CHLORIDE AND POTASSIUM CHLORIDE PRN MLS/HR: 9; 2.98 INJECTION, SOLUTION INTRAVENOUS at 10:39

## 2020-04-07 RX ADMIN — SODIUM CHLORIDE AND POTASSIUM CHLORIDE PRN MLS/HR: 9; 2.98 INJECTION, SOLUTION INTRAVENOUS at 17:20

## 2020-04-07 RX ADMIN — SODIUM POLYSTYRENE SULFONATE SCH: 15 SUSPENSION ORAL; RECTAL at 06:31

## 2020-04-07 RX ADMIN — SUCRALFATE SCH GM: 1 TABLET ORAL at 22:04

## 2020-04-07 RX ADMIN — SUCRALFATE SCH: 1 TABLET ORAL at 12:54

## 2020-04-07 RX ADMIN — Medication SCH: at 15:39

## 2020-04-07 RX ADMIN — SODIUM CHLORIDE AND POTASSIUM CHLORIDE PRN MLS/HR: 9; 2.98 INJECTION, SOLUTION INTRAVENOUS at 06:28

## 2020-04-07 RX ADMIN — OXYBUTYNIN CHLORIDE SCH MG: 5 TABLET ORAL at 22:04

## 2020-04-07 RX ADMIN — SODIUM POLYSTYRENE SULFONATE SCH: 15 SUSPENSION ORAL; RECTAL at 12:54

## 2020-04-07 RX ADMIN — OXYBUTYNIN CHLORIDE SCH MG: 5 TABLET ORAL at 16:25

## 2020-04-07 RX ADMIN — SODIUM CHLORIDE AND POTASSIUM CHLORIDE PRN MLS/HR: 9; 2.98 INJECTION, SOLUTION INTRAVENOUS at 03:09

## 2020-04-07 RX ADMIN — DOPAMINE HYDROCHLORIDE PRN MLS/HR: 320 INJECTION, SOLUTION INTRAVENOUS at 16:30

## 2020-04-07 RX ADMIN — SODIUM POLYSTYRENE SULFONATE SCH: 15 SUSPENSION ORAL; RECTAL at 16:25

## 2020-04-07 RX ADMIN — PANTOPRAZOLE SODIUM SCH: 40 TABLET, DELAYED RELEASE ORAL at 06:31

## 2020-04-07 RX ADMIN — PANTOPRAZOLE SODIUM SCH MG: 40 TABLET, DELAYED RELEASE ORAL at 16:25

## 2020-04-07 RX ADMIN — Medication SCH ML: at 22:05

## 2020-04-07 RX ADMIN — SUCRALFATE SCH: 1 TABLET ORAL at 11:02

## 2020-04-07 RX ADMIN — DIPHENOXYLATE HYDROCHLORIDE AND ATROPINE SULFATE SCH TAB: 2.5; .025 TABLET ORAL at 17:19

## 2020-04-07 RX ADMIN — Medication SCH ML: at 06:32

## 2020-04-07 NOTE — PDOC H&P
History of Present Illness


Admission Date/PCP: 


04/06/2020 21:43


GEORGIA ELLIS MD


Patient complains of: Generalized weakness


History of Present Illness: 


SERGEI BAKER is a 64 year old female who presented to the emergency 

department with a one-week history of generalized weakness.  She admits 

gradually worsening generalized weakness over the last week since she was 

discharged from a [facility].  Her weakness has become moderate to 

severe and has been accompanied by dark stools in her ileostomy bag.  Her 

weakness is made worse by any activities or exertion.  She has chronic lower GI 

bleeding and chronic renal failure.  She expresses the wish to be DNR/DNI and 

does not want any exceptional measures undertaken, she would like to just 

receive some IV fluids because she feels she is dehydrated and has experienced 

the same symptoms in the past.  She also admits chronic hypotension and does not

wish to be treated with vasopressors.  She denies other associated or 

accompanying signs and symptoms.  She has not identified any additional 

aggravating or ameliorating factors for her generalized weakness.  In the 

emergency room she was found to have acute on chronic renal failure with a 

potassium of 6.7 and was noted to be intermittently hypotensive.  She was 

subsequently admitted for further evaluation and treatment within the parameters

that she has outlined for her care.





Past Medical History


Cardiac Medical History: Reports: Myocardial Infarction - 2010, Hypertension


   Denies: Atrial Fibrillation, Congestive Heart Failure, Coronary Artery 

Disease, Hyperlipidema, Peripheral Vascular Disease, Pulmonary Embolism, Heart 

Murmur


Pulmonary Medical History: Reports: Asthma, Bronchitis, Chronic Obstructive Pu

lmonary Disease (COPD), Pneumonia - 2015


   Denies: Respiratory Failure, Sleep Apnea, Tuberculosis


EENT Medical History: 


   Denies: Cataracts, Ears - Hearing aids


Neurological Medical History: 


   Denies: Hemorrhagic CVA, Ischemic CVA, Seizures


Endocrine Medical History: Reports: Hyperthyroidism - Treated with radioactive 

iodine, Hypothyroidism - Post ablation


   Denies: Diabetes Mellitus Type 1, Diabetes Mellitus Type 2


Renal/ Medical History: Reports: Other - Episodes of acute renal failure


   Denies: Chronic Kidney Disease, Nephrolithiasis


Malignancy Medical History: Reports: Colorectal Cancer


GI Medical History: Reports: Gastroesophageal Reflux Disease, Other - Chronic 

lower GI bleeding


   Denies: Cirrhosis, Crohn's Disease, Hepatitis, Hiatal Hernia, Peptic Ulcer 

Disease, Ulcerative Colitis


Musculoskeltal Medical History: Reports: Arthritis


   Denies: Fibromyalgia


Skin Medical History: 


   Denies: Eczema, Psoriasis


Psychiatric Medical History: Reports: Depression


   Denies: Alcohol Dependency, Post Traumatic Stress Disorder, Substance Abuse, 

Tobacco Dependency


Traumatic Medical History: Reports: None


Hematology: Reports: Anemia - Chronic due to chronic blood loss, Bleeding 

Tendencies


Infectious Medical History: Reports: None





Past Surgical History


Past Surgical History: Reports: Ileostomy - With Low anterior resection and 

total colectomy-mesal-rectal, Tubal Ligation, Other - Tumor removal of the 

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





Social History


Information Source: Patient


Lives with: Alone


Smoking Status: Former Smoker - Former smoker 1/2 ppd but stopped many years ago


Electronic Cigarette use?: No


Frequency of Alcohol Use: None


Hx Recreational Drug Use: No


Drugs: None


Hx Prescription Drug Abuse: No





- Advance Directive


Resuscitation Status: Do Not Resuscitate


Surrogate healthcare decision maker:: 


Luisana Carranza





Family History


Family History: CAD, DM, Hyperlipidemia, Hypertension


Parental Family History Reviewed: Yes


Children Family History Reviewed: No


Sibling(s) Family History Reviewed.: Yes





Medication/Allergy


Home Medications: 








Levothyroxine Sodium 125 mcg PO QAM 03/09/12 


Oxybutynin Chloride [Ditropan 5 mg Tablet] 5 mg PO TID #0 03/09/12 


Ramipril [Altace 5 mg Capsule] 5 mg PO DAILY #0 03/09/12 


Pravastatin Sodium [Pravachol] 20 mg PO QHS 12/28/16 


Albuterol Sulfate [Ventolin 0.042% Neb 1.25 mg/3 mL Ampul] 1 vial NEB Q6HP PRN 

07/09/18 


Buspirone HCl [Buspar 15 mg Tablet] 15 mg PO TID 07/09/18 


Omeprazole 20 mg PO DAILY 07/09/18 


Sertraline HCl [Zoloft] 150 mg PO DAILY 09/20/19 


Atenolol [Tenormin] 25 mg PO QAM 01/23/20 


Hydroxyzine Pamoate [Vistaril 25 mg Capsule] 25 mg PO Q8HP PRN 02/28/20 


Magnesium Oxide [Mag-Ox 400 mg Tablet] 800 mg PO BID  tablet 03/04/20 


Sucralfate [Carafate 1 gm Tablet] 1 gm PO ACHS  tablet 03/04/20 


Acetaminophen [Tylenol 325 mg Tablet] 650 mg PO Q4HP PRN 03/15/20 


Aspirin [Aspirin 81 mg Chewable Tablet] 81 mg PO DAILY 03/15/20 


Lisinopril 20 mg PO DAILY 03/15/20 








Allergies/Adverse Reactions: 


                                        





shellfish derived Allergy (Severe, Verified 01/22/20 02:17)


   Hives


iodine [Iodine] Allergy (Intermediate, Verified 01/22/20 02:17)


   Rash


Sulfa (Sulfonamide Antibiotics) Allergy (Intermediate, Verified 01/22/20 02:17)


   Hives


Iodinated Contrast Media Adverse Reaction (Intermediate, Verified 01/22/20 

02:17)


   Nausea











Review of Systems


Constitutional: PRESENT: as per HPI, weakness.  ABSENT: chills, fever(s)


Eyes: ABSENT: visual disturbances, other - Eye pain


Ears: ABSENT: hearing changes, other - Ear pain


Nose, Mouth, and Throat: ABSENT: headache(s), sore throat


Cardiovascular: ABSENT: chest pain, palpitations


Respiratory: ABSENT: cough, dyspnea


Gastrointestinal: ABSENT: abdominal pain, constipation, diarrhea, nausea, 

vomiting


Genitourinary: ABSENT: dysuria, hematuria


Musculoskeletal: ABSENT: back pain, joint swelling


Integumentary: ABSENT: pruritus, rash


Neurological: ABSENT: confusion, convulsions, focal weakness, memory loss, 

syncope


Psychiatric: ABSENT: anxiety, depression


Endocrine: ABSENT: cold intolerance, heat intolerance, polydipsia, polyphagia, 

polyuria


Hematologic/Lymphatic: ABSENT: easy bleeding, easy bruising, lymphadenopathy


Allergic/Immunologic: ABSENT: seasonal rhinorrhea





Physical Exam


Vital Signs: 


                                        











Temp Pulse Resp BP Pulse Ox


 


 97.7 F   85   18   106/90 H  97 


 


 04/06/20 19:20  04/06/20 17:28  04/06/20 21:18  04/06/20 21:18  04/06/20 21:18








                                 Intake & Output











 04/04/20 04/05/20 04/06/20





 23:59 23:59 23:59


 


Intake Total   3000


 


Balance   3000


 


Weight   71.668 kg











General appearance: PRESENT: no acute distress, cooperative


Head exam: PRESENT: atraumatic, normocephalic


Eye exam: PRESENT: conjunctiva pink.  ABSENT: conjunctival injection, scleral 

icterus


Ear exam: PRESENT: normal external ear exam.  ABSENT: bleeding, drainage


Mouth exam: PRESENT: dry mucosa, neck supple


Neck exam: ABSENT: thyromegaly, tracheal deviation


Respiratory exam: PRESENT: clear to auscultation susan, symmetrical, unlabored


Cardiovascular exam: PRESENT: RRR.  ABSENT: clicks, gallop, rubs


Pulses: PRESENT: normal radial pulses, normal dorsalis pedis pul


Vascular exam: PRESENT: normal capillary refill.  ABSENT: pallor


GI/Abdominal exam: PRESENT: normal bowel sounds, soft, other - Ileostomy noted


Rectal exam: PRESENT: deferred


Extremities exam: ABSENT: joint swelling, pedal edema


Musculoskeletal exam: ABSENT: deformity, dislocation


Neurological exam: PRESENT: alert, oriented to person, oriented to place, 

oriented to time, oriented to situation, CN II-XII grossly intact.  ABSENT: 

motor sensory deficit


Psychiatric exam: PRESENT: appropriate affect, normal mood


Skin exam: PRESENT: dry, intact, warm.  ABSENT: jaundice, rash, urticaria





Results


Laboratory Results: 


                                        





                                 04/06/20 19:30 





                                 04/06/20 19:30 





                                        











  04/06/20 04/06/20 04/06/20





  18:10 18:10 18:10


 


WBC  Cancelled  


 


RBC  Cancelled  


 


Hgb  Cancelled  


 


Hct  Cancelled  


 


MCV  Cancelled  


 


MCH  Cancelled  


 


MCHC  Cancelled  


 


RDW  Cancelled  


 


Plt Count  Cancelled  


 


Seg Neutrophils %  Cancelled  


 


Sodium   Cancelled 


 


Potassium   Cancelled 


 


Chloride   Cancelled 


 


Carbon Dioxide   Cancelled 


 


Anion Gap   Cancelled 


 


BUN   Cancelled 


 


Creatinine   Cancelled 


 


Est GFR ( Amer)   Cancelled 


 


Est GFR (Non-Af Amer)   Cancelled 


 


Glucose   Cancelled 


 


Lactic Acid   


 


Calcium   Cancelled 


 


Total Bilirubin   Cancelled 


 


AST   Cancelled 


 


Alkaline Phosphatase   Cancelled 


 


Total Protein   Cancelled 


 


Albumin   Cancelled 


 


TSH    Cancelled


 


Urine Color   


 


Urine Appearance   


 


Urine pH   


 


Ur Specific Gravity   


 


Urine Protein   


 


Urine Glucose (UA)   


 


Urine Ketones   


 


Urine Blood   


 


Urine Nitrite   


 


Ur Leukocyte Esterase   


 


Urine WBC (Auto)   


 


Urine RBC (Auto)   


 


Blood Type   


 


Antibody Screen   














  04/06/20 04/06/20 04/06/20





  19:30 19:30 19:30


 


WBC   7.5 


 


RBC   2.75 L 


 


Hgb   8.5 L 


 


Hct   25.4 L 


 


MCV   92 


 


MCH   30.9 


 


MCHC   33.4 


 


RDW   19.1 H 


 


Plt Count   370 


 


Seg Neutrophils %   78.6 H 


 


Sodium   


 


Potassium   


 


Chloride   


 


Carbon Dioxide   


 


Anion Gap   


 


BUN   


 


Creatinine   


 


Est GFR ( Amer)   


 


Est GFR (Non-Af Amer)   


 


Glucose   


 


Lactic Acid  0.8  


 


Calcium   


 


Total Bilirubin   


 


AST   


 


Alkaline Phosphatase   


 


Total Protein   


 


Albumin   


 


TSH   


 


Urine Color   


 


Urine Appearance   


 


Urine pH   


 


Ur Specific Gravity   


 


Urine Protein   


 


Urine Glucose (UA)   


 


Urine Ketones   


 


Urine Blood   


 


Urine Nitrite   


 


Ur Leukocyte Esterase   


 


Urine WBC (Auto)   


 


Urine RBC (Auto)   


 


Blood Type    B POSITIVE


 


Antibody Screen    NEGATIVE














  04/06/20 04/06/20 04/06/20





  19:30 19:30 19:40


 


WBC   


 


RBC   


 


Hgb   


 


Hct   


 


MCV   


 


MCH   


 


MCHC   


 


RDW   


 


Plt Count   


 


Seg Neutrophils %   


 


Sodium  135.3 L  


 


Potassium  6.7 H*  


 


Chloride  110 H  


 


Carbon Dioxide  11 L  


 


Anion Gap  14  


 


BUN  110 H  


 


Creatinine  8.84 H  


 


Est GFR ( Amer)  5 L  


 


Est GFR (Non-Af Amer)   


 


Glucose  73 L  


 


Lactic Acid   


 


Calcium  7.4 L  


 


Total Bilirubin  0.2  


 


AST  16  


 


Alkaline Phosphatase  83  


 


Total Protein  6.1 L  


 


Albumin  3.3 L  


 


TSH   0.85 


 


Urine Color    YELLOW


 


Urine Appearance    CLOUDY


 


Urine pH    5.0


 


Ur Specific Gravity    1.017


 


Urine Protein    100 H


 


Urine Glucose (UA)    NEGATIVE


 


Urine Ketones    NEGATIVE


 


Urine Blood    MODERATE H


 


Urine Nitrite    NEGATIVE


 


Ur Leukocyte Esterase    LARGE H


 


Urine WBC (Auto)    33


 


Urine RBC (Auto)    30


 


Blood Type   


 


Antibody Screen   








                                        











  04/06/20 04/06/20





  18:10 19:30


 


CK-MB (CK-2)  Cancelled  8.47 H


 


Troponin I  Cancelled  0.076


 


NT-Pro-B Natriuret Pep  Cancelled  2050 H











Impressions: 


                                        





Acute Abdomen Series  04/06/20 17:41


IMPRESSION:  NO RADIOGRAPHIC EVIDENCE FOR ACUTE ABDOMINAL DISEASE.


UNCHANGED RIGHT STAGHORN CALCULUS.


 








Abdomen/Pelvis CT  04/06/20 19:36


IMPRESSION: 


 


No acute finding.


 


Other findings as described.


 














Assessment and Plan





- Diagnosis


(1) Acute kidney injury


Is this a current diagnosis for this admission?: Yes   





(2) Hyperkalemia, diminished renal excretion


Is this a current diagnosis for this admission?: Yes   





(3) Increased anion gap metabolic acidosis


Is this a current diagnosis for this admission?: Yes   





(4) Chronic lower gastrointestinal bleeding


Is this a current diagnosis for this admission?: Yes   





(5) Anemia due to blood loss, chronic


Is this a current diagnosis for this admission?: Yes   





(6) Chronic hypotension


Is this a current diagnosis for this admission?: Yes   





(7) Gastroesophageal reflux disease


Qualifiers: 


   Esophagitis presence: with esophagitis   Qualified Code(s): K21.0 - Gastro-

esophageal reflux disease with esophagitis   


Is this a current diagnosis for this admission?: Yes   





(8) Hypothyroidism


Qualifiers: 


   Hypothyroidism type: postablative   Qualified Code(s): E89.0 - Postprocedural

 hypothyroidism   


Is this a current diagnosis for this admission?: Yes   





- Plan Summary


Summary: 


Patient will be admitted to the medical floor on telemetry bed.  Her 

hyperkalemia be treated with Kayexalate and IV fluids.  Her acute renal failure 

treated with IV fluids.  She will receive Ativan 1 mg IV every 4 hours as needed

 anxiety or restlessness.  Patient wishes to be DNR/DNI and does not wish to 

have any life prolonging interventions.  She would like to receive only IV 

fluids and avoid blood transfusion if possible.  CBCs metabolic profiles and 

magnesium levels will be obtained as needed.  Patient will be placed on a 

regular diet at her request.   will be consulted to evaluate the 

patient's needs and assist her in possible hospice care or palliative care.





- Time


Time Spent with patient: 15-24 minutes


Medications reviewed and adjusted accordingly: Yes


Anticipated discharge: Hospice





- Inpatient Certification


Based on my medical assessment, after consideration of the patient's 

comorbidities, presenting symptoms, or acuity I expect that the services needed 

warrant INPATIENT care.: Yes


I certify that my determination is in accordance with my understanding of 

Medicare's requirements for reasonable and necessary INPATIENT services [42 CFR 

412.3e].: Yes


Medical Necessity: Need For IV Fluids, Need For Continuous Telemetry Monitoring,

Risk of Complication if Not Cared For in Hospital

## 2020-04-07 NOTE — PDOC PROGRESS REPORT
Subjective


Progress Note for:: 04/07/20


Subjective:: 





Patient denies any chest pain at this time of interview this morning.  Also 

denies any shortness of breath.


Reason For Visit: 


ACUTE KIDNEY INJURY,ACUTE HYPERKALEMIA,HYPOTENSION








Physical Exam


Vital Signs: 


                                        











Temp Pulse Resp BP Pulse Ox


 


 97.1 F   63   16   102/57 L  94 


 


 04/07/20 14:00  04/07/20 14:00  04/07/20 14:00  04/07/20 14:00  04/07/20 14:00








                                 Intake & Output











 04/06/20 04/07/20 04/08/20





 06:59 06:59 06:59


 


Intake Total  7049 1000


 


Output Total  200 300


 


Balance  6849 700


 


Weight  73.8 kg 











General appearance: PRESENT: no acute distress, cooperative


Neck exam: ABSENT: JVD


Respiratory exam: PRESENT: clear to auscultation susan, unlabored.  ABSENT: 

tachypnea, wheezes


Cardiovascular exam: PRESENT: RRR, +S1, +S2.  ABSENT: tachycardia


GI/Abdominal exam: PRESENT: soft.  ABSENT: rebound, rigid, tenderness


Neurological exam: PRESENT: awake, oriented to person, oriented to place, 

oriented to time, oriented to situation, other - Drowsy but awakens easily.  

Patient is fully conversational and answers questions appropriately.


Psychiatric exam: ABSENT: agitated, anxious





Results


Laboratory Results: 


                                        





                                 04/07/20 07:18 





                                 04/07/20 07:18 





                                        











  04/06/20 04/06/20 04/06/20





  18:10 18:10 18:10


 


WBC  Cancelled  


 


RBC  Cancelled  


 


Hgb  Cancelled  


 


Hct  Cancelled  


 


MCV  Cancelled  


 


MCH  Cancelled  


 


MCHC  Cancelled  


 


RDW  Cancelled  


 


Plt Count  Cancelled  


 


Seg Neutrophils %  Cancelled  


 


VBG pH   


 


VBG pCO2   


 


VBG HCO3   


 


VBG Base Excess   


 


Sodium   Cancelled 


 


Potassium   Cancelled 


 


Chloride   Cancelled 


 


Carbon Dioxide   Cancelled 


 


Anion Gap   Cancelled 


 


BUN   Cancelled 


 


Creatinine   Cancelled 


 


Est GFR ( Amer)   Cancelled 


 


Est GFR (Non-Af Amer)   Cancelled 


 


Glucose   Cancelled 


 


Lactic Acid   


 


Calcium   Cancelled 


 


Magnesium   


 


Total Bilirubin   Cancelled 


 


AST   Cancelled 


 


Alkaline Phosphatase   Cancelled 


 


Total Protein   Cancelled 


 


Albumin   Cancelled 


 


TSH    Cancelled


 


Free T3 pg/mL   


 


Urine Color   


 


Urine Appearance   


 


Urine pH   


 


Ur Specific Gravity   


 


Urine Protein   


 


Urine Glucose (UA)   


 


Urine Ketones   


 


Urine Blood   


 


Urine Nitrite   


 


Ur Leukocyte Esterase   


 


Urine WBC (Auto)   


 


Urine RBC (Auto)   


 


Blood Type   


 


Antibody Screen   














  04/06/20 04/06/20 04/06/20





  19:30 19:30 19:30


 


WBC   7.5 


 


RBC   2.75 L 


 


Hgb   8.5 L 


 


Hct   25.4 L 


 


MCV   92 


 


MCH   30.9 


 


MCHC   33.4 


 


RDW   19.1 H 


 


Plt Count   370 


 


Seg Neutrophils %   78.6 H 


 


VBG pH   


 


VBG pCO2   


 


VBG HCO3   


 


VBG Base Excess   


 


Sodium   


 


Potassium   


 


Chloride   


 


Carbon Dioxide   


 


Anion Gap   


 


BUN   


 


Creatinine   


 


Est GFR ( Amer)   


 


Est GFR (Non-Af Amer)   


 


Glucose   


 


Lactic Acid  0.8  


 


Calcium   


 


Magnesium   


 


Total Bilirubin   


 


AST   


 


Alkaline Phosphatase   


 


Total Protein   


 


Albumin   


 


TSH   


 


Free T3 pg/mL   


 


Urine Color   


 


Urine Appearance   


 


Urine pH   


 


Ur Specific Gravity   


 


Urine Protein   


 


Urine Glucose (UA)   


 


Urine Ketones   


 


Urine Blood   


 


Urine Nitrite   


 


Ur Leukocyte Esterase   


 


Urine WBC (Auto)   


 


Urine RBC (Auto)   


 


Blood Type    B POSITIVE


 


Antibody Screen    NEGATIVE














  04/06/20 04/06/20 04/06/20





  19:30 19:30 19:40


 


WBC   


 


RBC   


 


Hgb   


 


Hct   


 


MCV   


 


MCH   


 


MCHC   


 


RDW   


 


Plt Count   


 


Seg Neutrophils %   


 


VBG pH   


 


VBG pCO2   


 


VBG HCO3   


 


VBG Base Excess   


 


Sodium  135.3 L  


 


Potassium  6.7 H*  


 


Chloride  110 H  


 


Carbon Dioxide  11 L  


 


Anion Gap  14  


 


BUN  110 H  


 


Creatinine  8.84 H  


 


Est GFR ( Amer)  5 L  


 


Est GFR (Non-Af Amer)   


 


Glucose  73 L  


 


Lactic Acid   


 


Calcium  7.4 L  


 


Magnesium   


 


Total Bilirubin  0.2  


 


AST  16  


 


Alkaline Phosphatase  83  


 


Total Protein  6.1 L  


 


Albumin  3.3 L  


 


TSH   0.85 


 


Free T3 pg/mL   


 


Urine Color    YELLOW


 


Urine Appearance    CLOUDY


 


Urine pH    5.0


 


Ur Specific Gravity    1.017


 


Urine Protein    100 H


 


Urine Glucose (UA)    NEGATIVE


 


Urine Ketones    NEGATIVE


 


Urine Blood    MODERATE H


 


Urine Nitrite    NEGATIVE


 


Ur Leukocyte Esterase    LARGE H


 


Urine WBC (Auto)    33


 


Urine RBC (Auto)    30


 


Blood Type   


 


Antibody Screen   














  04/07/20 04/07/20 04/07/20





  07:18 07:18 07:18


 


WBC  6.9  


 


RBC  2.70 L  


 


Hgb  8.4 L  


 


Hct  24.8 L  


 


MCV  92  


 


MCH  31.2  


 


MCHC  34.0  


 


RDW  19.3 H  


 


Plt Count  364  


 


Seg Neutrophils %   


 


VBG pH   


 


VBG pCO2   


 


VBG HCO3   


 


VBG Base Excess   


 


Sodium   141.8 


 


Potassium   4.6  D 


 


Chloride   116 H 


 


Carbon Dioxide   12 L 


 


Anion Gap   14 


 


BUN   91 H 


 


Creatinine   5.93 H 


 


Est GFR ( Amer)   9 L 


 


Est GFR (Non-Af Amer)   


 


Glucose   150 H 


 


Lactic Acid   


 


Calcium   7.1 L 


 


Magnesium   0.7 L* 


 


Total Bilirubin   


 


AST   


 


Alkaline Phosphatase   


 


Total Protein   


 


Albumin   


 


TSH    0.42 L


 


Free T3 pg/mL    3.42


 


Urine Color   


 


Urine Appearance   


 


Urine pH   


 


Ur Specific Gravity   


 


Urine Protein   


 


Urine Glucose (UA)   


 


Urine Ketones   


 


Urine Blood   


 


Urine Nitrite   


 


Ur Leukocyte Esterase   


 


Urine WBC (Auto)   


 


Urine RBC (Auto)   


 


Blood Type   


 


Antibody Screen   














  04/07/20





  10:33


 


WBC 


 


RBC 


 


Hgb 


 


Hct 


 


MCV 


 


MCH 


 


MCHC 


 


RDW 


 


Plt Count 


 


Seg Neutrophils % 


 


VBG pH  7.17 L*


 


VBG pCO2  31.0 L


 


VBG HCO3  11.0 L


 


VBG Base Excess  -16.2


 


Sodium 


 


Potassium 


 


Chloride 


 


Carbon Dioxide 


 


Anion Gap 


 


BUN 


 


Creatinine 


 


Est GFR ( Amer) 


 


Est GFR (Non-Af Amer) 


 


Glucose 


 


Lactic Acid 


 


Calcium 


 


Magnesium 


 


Total Bilirubin 


 


AST 


 


Alkaline Phosphatase 


 


Total Protein 


 


Albumin 


 


TSH 


 


Free T3 pg/mL 


 


Urine Color 


 


Urine Appearance 


 


Urine pH 


 


Ur Specific Gravity 


 


Urine Protein 


 


Urine Glucose (UA) 


 


Urine Ketones 


 


Urine Blood 


 


Urine Nitrite 


 


Ur Leukocyte Esterase 


 


Urine WBC (Auto) 


 


Urine RBC (Auto) 


 


Blood Type 


 


Antibody Screen 








                                        











  04/06/20 04/06/20





  18:10 19:30


 


CK-MB (CK-2)  Cancelled  8.47 H


 


Troponin I  Cancelled  0.076


 


NT-Pro-B Natriuret Pep  Cancelled  2050 H











Impressions: 


                                        





Acute Abdomen Series  04/06/20 17:41


IMPRESSION:  NO RADIOGRAPHIC EVIDENCE FOR ACUTE ABDOMINAL DISEASE.


UNCHANGED RIGHT STAGHORN CALCULUS.


 








Abdomen/Pelvis CT  04/06/20 19:36


IMPRESSION: 


 


No acute finding.


 


Other findings as described.


 














Assessment and Plan





- Diagnosis


(1) Acute renal failure


Qualifiers: 


   Acute renal failure type: unspecified   Qualified Code(s): N17.9 - Acute 

kidney failure, unspecified   


Is this a current diagnosis for this admission?: Yes   


Plan: 


Baseline creatinine seems to be around 1.  However 8.8 on admission.


Improving with IV fluids.


Patient indicated no overtly aggressive measures such as central line placement,

and DNR/DNI


We will maintain on IV fluids and trend BMP.  Will place Patel for critical 

monitoring of urine output.


Nephrology consulted











(2) Hypotension


Qualifiers: 


   Hypotension type: other hypotension type   Qualified Code(s): I95.89 - Other 

hypotension   


Is this a current diagnosis for this admission?: Yes   


Plan: 


Patient states that her blood pressure is usually soft.  Managing peripherally 

with dopamine drip.  Patient expressed earlier that she does not want central 

line placement as of this morning.  Potential etiology of hypotension include 

hypovolemia and severe metabolic acidosis.








(3) Increased anion gap metabolic acidosis


Is this a current diagnosis for this admission?: Yes   


Plan: 


Likely secondary to ANNALEE.  Placed on sodium bicarb drip.  Will give an amp of 

sodium bicarb as well.








(4) Anemia due to blood loss, chronic


Is this a current diagnosis for this admission?: Yes   


Plan: 


Patient has chronic bleeding in the ostomy.  Hemoglobin of 8.5.  No need for 

transfusion now.  She does have history of colorectal cancer status post 

colectomy.  We will monitor her H&H for now.  Patient would like to have low 

blood transfusions at this point as noted by prior provider.  Check iron 

studies.








(5) Chronic lower gastrointestinal bleeding


Is this a current diagnosis for this admission?: Yes   


Plan: 


Plan as above








(6) Hyperkalemia, diminished renal excretion


Is this a current diagnosis for this admission?: Yes   


Plan: 


Kayexalate.  Monitor BMP.








- Time


Time Spent with patient: Less than 15 minutes

## 2020-04-08 LAB
ABSOLUTE RETICS #: 0.04 10^6/UL (ref 0.03–0.12)
ALBUMIN SERPL-MCNC: 2.5 G/DL (ref 3.5–5)
ANION GAP SERPL CALC-SCNC: 8 MMOL/L (ref 5–19)
ANION GAP SERPL CALC-SCNC: 9 MMOL/L (ref 5–19)
BUN SERPL-MCNC: 48 MG/DL (ref 7–20)
BUN SERPL-MCNC: 61 MG/DL (ref 7–20)
CALCIUM: 6.2 MG/DL (ref 8.4–10.2)
CALCIUM: 7.5 MG/DL (ref 8.4–10.2)
CHLORIDE SERPL-SCNC: 108 MMOL/L (ref 98–107)
CHLORIDE SERPL-SCNC: 113 MMOL/L (ref 98–107)
CK MB SERPL-MCNC: 4.47 NG/ML (ref ?–4.55)
CK SERPL-CCNC: 189 U/L (ref 30–135)
CO2 SERPL-SCNC: 20 MMOL/L (ref 22–30)
CO2 SERPL-SCNC: 24 MMOL/L (ref 22–30)
ERYTHROCYTE [DISTWIDTH] IN BLOOD BY AUTOMATED COUNT: 19.2 % (ref 11.5–14)
FERRITIN SERPL-MCNC: 146 NG/ML (ref 11.1–264)
FOLATE SERPL-MCNC: 2.97 NG/ML (ref 2.76–?)
GLUCOSE SERPL-MCNC: 162 MG/DL (ref 75–110)
GLUCOSE SERPL-MCNC: 163 MG/DL (ref 75–110)
HCT VFR BLD CALC: 21.2 % (ref 36–47)
HGB BLD-MCNC: 7.4 G/DL (ref 12–15.5)
IRON(TIBC): 77.3 UG/DL (ref 37–170)
MCH RBC QN AUTO: 31.3 PG (ref 27–33.4)
MCHC RBC AUTO-ENTMCNC: 35 G/DL (ref 32–36)
MCV RBC AUTO: 90 FL (ref 80–97)
PLATELET # BLD: 340 10^3/UL (ref 150–450)
POTASSIUM SERPL-SCNC: 3.2 MMOL/L (ref 3.6–5)
POTASSIUM SERPL-SCNC: 3.4 MMOL/L (ref 3.6–5)
RBC # BLD AUTO: 2.37 10^6/UL (ref 3.72–5.28)
RETICULOCYTE COUNT (AUTO): 1.59 % (ref 0.66–2.85)
TROPONIN I SERPL-MCNC: 0.07 NG/ML
WBC # BLD AUTO: 5.5 10^3/UL (ref 4–10.5)

## 2020-04-08 RX ADMIN — PANTOPRAZOLE SODIUM SCH MG: 40 TABLET, DELAYED RELEASE ORAL at 06:01

## 2020-04-08 RX ADMIN — DIPHENOXYLATE HYDROCHLORIDE AND ATROPINE SULFATE SCH TAB: 2.5; .025 TABLET ORAL at 13:55

## 2020-04-08 RX ADMIN — SUCRALFATE SCH GM: 1 TABLET ORAL at 21:35

## 2020-04-08 RX ADMIN — DEXAMETHASONE SODIUM PHOSPHATE PRN MG: 10 INJECTION INTRAMUSCULAR; INTRAVENOUS at 17:29

## 2020-04-08 RX ADMIN — SUCRALFATE SCH: 1 TABLET ORAL at 11:46

## 2020-04-08 RX ADMIN — PROMETHAZINE HYDROCHLORIDE PRN MG: 25 INJECTION INTRAMUSCULAR; INTRAVENOUS at 13:56

## 2020-04-08 RX ADMIN — SUCRALFATE SCH GM: 1 TABLET ORAL at 17:00

## 2020-04-08 RX ADMIN — SERTRALINE HYDROCHLORIDE SCH MG: 50 TABLET ORAL at 09:30

## 2020-04-08 RX ADMIN — CALCIUM GLUCONATE SCH MLS/HR: 20 INJECTION, SOLUTION INTRAVENOUS at 12:42

## 2020-04-08 RX ADMIN — SUCRALFATE SCH GM: 1 TABLET ORAL at 07:58

## 2020-04-08 RX ADMIN — SODIUM CHLORIDE AND POTASSIUM CHLORIDE PRN MLS/HR: 9; 2.98 INJECTION, SOLUTION INTRAVENOUS at 08:00

## 2020-04-08 RX ADMIN — OXYBUTYNIN CHLORIDE SCH MG: 5 TABLET ORAL at 21:35

## 2020-04-08 RX ADMIN — SODIUM CHLORIDE AND POTASSIUM CHLORIDE PRN MLS/HR: 9; 2.98 INJECTION, SOLUTION INTRAVENOUS at 00:20

## 2020-04-08 RX ADMIN — Medication SCH ML: at 06:01

## 2020-04-08 RX ADMIN — LEVOTHYROXINE SODIUM SCH MG: 100 TABLET ORAL at 09:31

## 2020-04-08 RX ADMIN — OXYBUTYNIN CHLORIDE SCH MG: 5 TABLET ORAL at 06:01

## 2020-04-08 RX ADMIN — POTASSIUM CHLORIDE SCH MLS/HR: 29.8 INJECTION, SOLUTION INTRAVENOUS at 20:19

## 2020-04-08 RX ADMIN — LEVOTHYROXINE SODIUM SCH MG: 25 TABLET ORAL at 09:31

## 2020-04-08 RX ADMIN — DEXAMETHASONE SODIUM PHOSPHATE PRN MG: 10 INJECTION INTRAMUSCULAR; INTRAVENOUS at 21:35

## 2020-04-08 RX ADMIN — POTASSIUM CHLORIDE SCH: 1.5 FOR SOLUTION ORAL at 21:44

## 2020-04-08 RX ADMIN — Medication SCH: at 13:59

## 2020-04-08 RX ADMIN — DIPHENOXYLATE HYDROCHLORIDE AND ATROPINE SULFATE SCH TAB: 2.5; .025 TABLET ORAL at 17:29

## 2020-04-08 RX ADMIN — MAGNESIUM SULFATE IN DEXTROSE SCH MLS/HR: 10 INJECTION, SOLUTION INTRAVENOUS at 09:30

## 2020-04-08 RX ADMIN — SODIUM CHLORIDE PRN MLS/HR: 9 INJECTION, SOLUTION INTRAVENOUS at 18:11

## 2020-04-08 RX ADMIN — POTASSIUM CHLORIDE SCH MLS/HR: 29.8 INJECTION, SOLUTION INTRAVENOUS at 18:12

## 2020-04-08 RX ADMIN — DOPAMINE HYDROCHLORIDE PRN MLS/HR: 320 INJECTION, SOLUTION INTRAVENOUS at 10:00

## 2020-04-08 RX ADMIN — DIPHENOXYLATE HYDROCHLORIDE AND ATROPINE SULFATE SCH TAB: 2.5; .025 TABLET ORAL at 09:31

## 2020-04-08 RX ADMIN — CALCIUM GLUCONATE SCH MLS/HR: 20 INJECTION, SOLUTION INTRAVENOUS at 05:57

## 2020-04-08 RX ADMIN — CALCIUM GLUCONATE SCH MLS/HR: 20 INJECTION, SOLUTION INTRAVENOUS at 13:49

## 2020-04-08 RX ADMIN — Medication SCH ML: at 21:35

## 2020-04-08 RX ADMIN — MAGNESIUM SULFATE IN DEXTROSE SCH MLS/HR: 10 INJECTION, SOLUTION INTRAVENOUS at 10:41

## 2020-04-08 RX ADMIN — ACETAMINOPHEN PRN MG: 325 TABLET ORAL at 09:30

## 2020-04-08 RX ADMIN — DEXAMETHASONE SODIUM PHOSPHATE PRN MG: 10 INJECTION INTRAMUSCULAR; INTRAVENOUS at 04:35

## 2020-04-08 RX ADMIN — CALCIUM GLUCONATE SCH MLS/HR: 20 INJECTION, SOLUTION INTRAVENOUS at 04:21

## 2020-04-08 RX ADMIN — OXYBUTYNIN CHLORIDE SCH MG: 5 TABLET ORAL at 13:55

## 2020-04-08 RX ADMIN — MAGNESIUM SULFATE IN DEXTROSE SCH MLS/HR: 10 INJECTION, SOLUTION INTRAVENOUS at 11:50

## 2020-04-08 RX ADMIN — SODIUM POLYSTYRENE SULFONATE SCH: 15 SUSPENSION ORAL; RECTAL at 08:00

## 2020-04-08 RX ADMIN — PANTOPRAZOLE SODIUM SCH MG: 40 TABLET, DELAYED RELEASE ORAL at 17:30

## 2020-04-08 NOTE — PDOC CONSULTATION
Consultation


Consult Date: 20


Provider Consulted: TALON YAN


Consult reason:: renal failure





History of Present Illness


Admission Date/PCP: 


  20 22:21





  GEORGIA ELLIS MD





History of Present Illness: 


SERGEI BAKER is a 64 year old female with a past medical history of an 

ileostomy, hypotension and recent admission in [facility] for similar issues. She 

came to the ER for generalized weakness. The weakness had started almost right 

after being discharged from [facility] 1 week ago. She claims that since being 

discharged she has had to replace her ileostomy bag 3 to 4 times a day. She is 

also seeing dark stools and what looks like blood.





In the ER she was found to have a creatinine of 8.8, bun of 110 and potassium of

6.7. Bicarb was 11 and hemoglobin  was 8.5. Bp was 60/43. An abdominal CT was 

done that showed severe hydronephrosis of the right kidney with a stone and some

cortical atrophy. The left kidney did not have hydronephrosis but it did have 

some small stones. She was started on normal saline, had a cardoza catheter 

placed, given kayexelate. Beyond that she did not initially want anymore done. 

On  she was started on lomotil, a bicarb drip and found to have a magnesium 

of 0.7. She was given IV magnesium at the time and was also started on dopamine 

for her bp. 





 Today she claims to be doing better. According to her she has never had any 

underlining kidney disease. She denies difficulty with urination, dysuria, 

urinary retention or flank pain. She denies any chest pain, SOB beyond baseline.

She does have some nausea but no vomiting. Her diarrhea has stopped and she is 

back to replacing a bag every couple of days. 





Past Medical History


Cardiac Medical History: Reports: Myocardial Infarction - 


   Denies: Atrial Fibrillation, Coronary Artery Disease, Heart Murmur, 

Hyperlipidemia, Peripheral Vascular Disease, Pulmonary Embolism


Pulmonary Medical History: Reports: Asthma, Bronchitis, Chronic Obstructive 

Pulmonary Disease (COPD), Pneumonia - 


   Denies: Respiratory Failure, Sleep Apnea, Tuberculosis


EENT Medical History: 


   Denies: Cataracts, Ears - Hearing aids


Neurological Medical History: 


   Denies: Hemorrhagic CVA, Ischemic CVA, Seizures


Endocrine Medical History: Reports: Hyperthyroidism - Treated with radioactive 

iodine, Hypothyroidism - Post ablation


   Denies: Diabetes Mellitus Type 1, Diabetes Mellitus Type 2


Complications of Diabetes: Reports: None


Renal/ Medical History: Reports: Other - Episodes of acute renal failure


   Denies: Nephrolithiasis


Malignancy Medical History: Reports: Colorectal Cancer


   Denies: Leukemia, Lung Cancer


GI Medical History: Reports: Gastroesophageal Reflux Disease, Other - Chronic 

lower GI bleeding


   Denies: Cirrhosis, Crohn's Disease, Hepatitis, Hiatal Hernia, Peptic Ulcer 

Disease, Ulcerative Colitis


Musculoskeltal Medical History: Reports: Arthritis


   Denies: Fibromyalgia, Rheumatoid Arthritis, Systemic Lupus Erythematosus


Skin Medical History: 


   Denies: Eczema, Psoriasis


Psychiatric Medical History: Reports: Depression


   Denies: Alcohol Dependency, Bipolar Disorder, Dementia, Post Traumatic Stress

Disorder, Substance Abuse, Tobacco Dependency


Traumatic Medical History: Reports: None


Infectious Medical History: Reports: None


   Denies: HIV





Past Surgical History


Past Surgical History: Reports: Ileostomy - With Low anterior resection and 

total colectomy-mesal-rectal, Tubal Ligation, Other - Tumor removal of the 

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


   Denies: Appendectomy,  Section, Cholecystectomy, Colostomy, Coronary 

Artery Bypass Graft, Gastric Bypass Surgery, Herniorrhaphy, Hysterectomy, Mas

tectomy, Pacemaker, Tonsillectomy





Social History


Lives with: Alone


Smoking Status: Former Smoker - Former smoker 1/2 ppd but stopped many years ago


Electronic Cigarette use?: No


Frequency of Alcohol Use: None


Hx Recreational Drug Use: No


Drugs: None


Hx Prescription Drug Abuse: No





- Advance Directive


Resuscitation Status: Full Code





Family History


Parental Family History Reviewed: Yes


Children Family History Reviewed: NA


Sibling(s) Family History Reviewed.: Yes





Medication/Allergy


Home Medications: 








Levothyroxine Sodium 125 mcg PO DAILY 12 


Oxybutynin Chloride [Ditropan 5 mg Tablet] 5 mg PO TID #0 12 


Ramipril [Altace 5 mg Capsule] 5 mg PO DAILY #0 12 


Pravastatin Sodium [Pravachol] 20 mg PO QHS 16 


Omeprazole 20 mg PO DAILY 18 


Sertraline HCl [Zoloft] 150 mg PO DAILY 19 


Atenolol [Tenormin] 25 mg PO QAM 20 


Hydroxyzine Pamoate [Vistaril 25 mg Capsule] 25 mg PO Q8HP PRN 20 


Alprazolam [Xanax] 1 mg PO TID 20 


Diphenoxylate HCl/Atrop Sulf [Lomotil 2.5 mg Tablet] 2 tab PO TID 20 


Hydroxyzine Pamoate [Vistaril 25 mg Capsule] 25 mg PO Q8HP PRN 20 








Allergies/Adverse Reactions: 


                                        





shellfish derived Allergy (Severe, Verified 20 02:17)


   Hives


iodine [Iodine] Allergy (Intermediate, Verified 20 02:17)


   Rash


Sulfa (Sulfonamide Antibiotics) Allergy (Intermediate, Verified 20 02:17)


   Hives


Iodinated Contrast Media Adverse Reaction (Intermediate, Verified 20 

02:17)


   Nausea











Review of Systems


Constitutional: PRESENT: fatigue, weakness.  ABSENT: chills, fever(s), 

headache(s), night sweats


Cardiovascular: PRESENT: dyspnea on exertion.  ABSENT: chest pain, edema, 

orthropnea, palpitations


Respiratory: PRESENT: dyspnea.  ABSENT: cough, hemoptysis, sputum


Gastrointestinal: PRESENT: abdominal pain, bloating, diarrhea, nausea.  ABSENT: 

constipation, vomiting


Genitourinary: ABSENT: difficulty urinating, dysuria, hematuria, nocturia


Musculoskeletal: PRESENT: muscle weakness.  ABSENT: back pain


Neurological: PRESENT: focal weakness, weakness.  ABSENT: confusion, numbness





Physical Exam


Vital Signs: 


                                        











Temp Pulse Resp BP Pulse Ox


 


 98.3 F   87   16   98/62 L  100 


 


 20 07:16  20 12:00  20 09:32  20 12:00  20 09:32








                                 Intake & Output











 20





 06:59 06:59 06:59


 


Intake Total 7001 4263 1535


 


Output Total 200 2000 


 


Balance 1034 2263 1535


 


Weight 73.8 kg 78.5 kg 











General appearance: PRESENT: no acute distress, well-developed, well-nourished


Mouth exam: PRESENT: dry mucosa, neck supple.  ABSENT: moist


Neck exam: ABSENT: JVD, tracheal deviation


Respiratory exam: PRESENT: clear to auscultation susan.  ABSENT: accessory muscle 

use, crackles, rales, rhonchi, wheezes


Cardiovascular exam: PRESENT: +S1, +S2


GI/Abdominal exam: PRESENT: soft.  ABSENT: ascites, distended, guarding, 

tenderness


Extremities exam: ABSENT: tenderness, +1 edema, +2 edema


Musculoskeletal exam: PRESENT: normal inspection.  ABSENT: tenderness


Neurological exam: PRESENT: alert, awake, oriented to person, oriented to place,

 oriented to time, oriented to situation


Psychiatric exam: PRESENT: depressed


Skin exam: PRESENT: dry, intact, warm





Results


Laboratory Results: 


                                        





                                 20 06:15 





                                 20 02:40 





                                        











  20





  18:10 17:30 02:40


 


WBC  Cancelled  


 


RBC  Cancelled  


 


Hgb  Cancelled  


 


Hct  Cancelled  


 


MCV  Cancelled  


 


MCH  Cancelled  


 


MCHC  Cancelled  


 


RDW  Cancelled  


 


Plt Count  Cancelled  


 


Seg Neutrophils %  Cancelled  


 


Retic Count (auto)   


 


Carbonic Acid   0.92 L 


 


HCO3/H2CO3 Ratio   16:1 


 


ABG pH   7.32 L 


 


ABG pCO2   30.4 L 


 


ABG pO2   103.9 H 


 


ABG HCO3   15.4 L 


 


ABG O2 Saturation   97.5 


 


ABG Base Excess   -9.5 


 


FiO2   1.5 


 


Sodium    141.9


 


Potassium    3.4 L D


 


Chloride    113 H


 


Carbon Dioxide    20 L


 


Anion Gap    9


 


BUN    61 H D


 


Creatinine    2.37 H


 


Est GFR ( Amer)    25 L


 


Glucose    162 H


 


Calcium    6.2 L*


 


Magnesium    1.4 L


 


Iron    77.3


 


TIBC    263


 


% Saturation    29


 


Ferritin    146.00


 


Albumin   


 


Vitamin B12    514.0


 


Folate    2.97














  20





  06:15 10:07


 


WBC  5.5 


 


RBC  2.37 L 


 


Hgb  7.4 L 


 


Hct  21.2 L 


 


MCV  90 


 


MCH  31.3 


 


MCHC  35.0 


 


RDW  19.2 H 


 


Plt Count  340 


 


Seg Neutrophils %  


 


Retic Count (auto)  1.59 


 


Carbonic Acid  


 


HCO3/H2CO3 Ratio  


 


ABG pH  


 


ABG pCO2  


 


ABG pO2  


 


ABG HCO3  


 


ABG O2 Saturation  


 


ABG Base Excess  


 


FiO2  


 


Sodium  


 


Potassium  


 


Chloride  


 


Carbon Dioxide  


 


Anion Gap  


 


BUN  


 


Creatinine  


 


Est GFR (African Amer)  


 


Glucose  


 


Calcium  


 


Magnesium  


 


Iron  


 


TIBC  


 


% Saturation  


 


Ferritin  


 


Albumin   2.5 L


 


Vitamin B12  


 


Folate  








                                        











  20





  18:10 19:30 02:40


 


Creatine Kinase    198 H


 


CK-MB (CK-2)  Cancelled  8.47 H 


 


Troponin I  Cancelled  0.076 


 


NT-Pro-B Natriuret Pep  Cancelled  2050 H 














  20





  02:40 10:07


 


Creatine Kinase   189 H


 


CK-MB (CK-2)  4.47 


 


Troponin I  0.070 


 


NT-Pro-B Natriuret Pep  











Impressions: 


                                        





Acute Abdomen Series  20 17:41


IMPRESSION:  NO RADIOGRAPHIC EVIDENCE FOR ACUTE ABDOMINAL DISEASE.


UNCHANGED RIGHT STAGHORN CALCULUS.


 








Abdomen/Pelvis CT  20 19:36


IMPRESSION: 


 


No acute finding.


 


Other findings as described.


 














Assessment & Plan





- Diagnosis


(1) Hypokalemia


Plan: 


on PO potassium replacement. Patient decrease is most likely due to dilution 

from being hydrated and some cellular shift with receiving bicarb. Will reassess

 later this afternoon.








(2) Anemia


Qualifiers: 


   Anemia type: unspecified type   Qualified Code(s): D64.9 - Anemia, 

unspecified   


Plan: 


per hospitalist








(3) Hypotension


Qualifiers: 


   Hypotension type: other hypotension type   Qualified Code(s): I95.89 - Other 

hypotension   


Is this a current diagnosis for this admission?: Yes   


Plan: 


most likely due to dehydration, continue normal saline. Will continue to assess 

as dopamine is decreased. Recommend checking an AM cortisol if that has not been

 checked.








(4) Acidosis


Plan: 


Due ANNALEE, continue bicarb drip for now. Will reassess bicarb later this 

afternoon. Will consider switching to PO bicarb.








(5) Acute kidney injury


Is this a current diagnosis for this admission?: Yes   


Plan: 


nonoliguric due to hypotension and dehydration, other factors affecting her 

kidney function include urinary retention causing hydronephrosis of the right 

kidney. Continue normal saline, bicarb drip and electrolyte replacement. Keep 

cardoza catheter in place and continue lomotil to prevent diarrhea. Will reduce 

normal saline to 100mL an hour to prevent to rapid of correction and causing 

fluid overload. Re-evaluating CT, hydronephrosis looks chronic, will not need a 

urology follow up. 








(6) COPD (chronic obstructive pulmonary disease)


Qualifiers: 


   COPD type: unspecified COPD   Qualified Code(s): J44.9 - Chronic obstructive 

pulmonary disease, unspecified   


Plan: 


per hospitalist services








(7) Hypomagnesemia


Plan: 


currently receiving IV mag, likely to keep going down with the increase in 

fluids. Will recheck later today.








(8) Hypocalcemia


Plan: 


calcium only corrects to 7.4, she has received 2 grams of calcium gluconate 

today. Will look to add PO calcium and get an ionized calcium.

## 2020-04-08 NOTE — ADVANCED CARE
- Diagnosis


(1) Acute renal failure


Diagnosis Current: Yes   





(2) Hypotension


Diagnosis Current: Yes   














(6) Colorectal cancer


Diagnosis Current: Yes   


Attendance: 





Today patient is more awake, alert and we revisited conversation asked to 

advance care planning.  We discussed CODE STATUS for which I thoroughly 

explained the meaning and implications.  Patient opting now to do full code.  

She is also willing for all attempts to be made that can improve her medical 

condition including central line for pressors if needed.  Currently does not 

have any family and lives with a roommate and the parents who she considers her 

closest acquaintances.


Resuscitation Status: Full Code


Time Spent: 16 minutes

## 2020-04-08 NOTE — PDOC PROGRESS REPORT
Subjective


Progress Note for:: 04/08/20


Subjective:: 





Patient is feeling better today.  She is mild were alert and awake today.  She 

denies any shortness of breath.  She does admit to not drinking enough fluids 

and not eating sufficiently at home.  Denies any chest pain though did have some

yesterday.  Denies any history of CAD.


Reason For Visit: 


ACUTE KIDNEY INJURY,ACUTE HYPERKALEMIA,HYPOTENSION








Physical Exam


Vital Signs: 


                                        











Temp Pulse Resp BP Pulse Ox


 


 98.3 F   83   16   122/79   100 


 


 04/08/20 07:16  04/08/20 09:32  04/08/20 09:32  04/08/20 09:00  04/08/20 09:32








                                 Intake & Output











 04/07/20 04/08/20 04/09/20





 06:59 06:59 06:59


 


Intake Total 7049 4263 1235


 


Output Total 200 2000 


 


Balance 6849 2263 1235


 


Weight 73.8 kg 78.5 kg 











General appearance: PRESENT: no acute distress, cooperative


Neck exam: ABSENT: JVD


Respiratory exam: PRESENT: clear to auscultation susan, unlabored.  ABSENT: 

tachypnea, wheezes


Cardiovascular exam: PRESENT: RRR, +S1, +S2.  ABSENT: tachycardia


GI/Abdominal exam: PRESENT: soft, other - Ostomy with greenish fluid.  ABSENT: 

rebound, rigid, tenderness


Neurological exam: PRESENT: alert, awake, oriented to person, oriented to place,

oriented to time, oriented to situation





Results


Laboratory Results: 


                                        





                                 04/08/20 06:15 





                                 04/08/20 02:40 





                                        











  04/06/20 04/07/20 04/07/20





  18:10 10:33 17:30


 


WBC  Cancelled  


 


RBC  Cancelled  


 


Hgb  Cancelled  


 


Hct  Cancelled  


 


MCV  Cancelled  


 


MCH  Cancelled  


 


MCHC  Cancelled  


 


RDW  Cancelled  


 


Plt Count  Cancelled  


 


Seg Neutrophils %  Cancelled  


 


Retic Count (auto)   


 


Carbonic Acid    0.92 L


 


HCO3/H2CO3 Ratio    16:1


 


ABG pH    7.32 L


 


ABG pCO2    30.4 L


 


ABG pO2    103.9 H


 


ABG HCO3    15.4 L


 


ABG O2 Saturation    97.5


 


ABG Base Excess    -9.5


 


VBG pH   7.17 L* 


 


VBG pCO2   31.0 L 


 


VBG HCO3   11.0 L 


 


VBG Base Excess   -16.2 


 


FiO2    1.5


 


Sodium   


 


Potassium   


 


Chloride   


 


Carbon Dioxide   


 


Anion Gap   


 


BUN   


 


Creatinine   


 


Est GFR (African Amer)   


 


Glucose   


 


Calcium   


 


Magnesium   


 


Iron   


 


TIBC   


 


% Saturation   


 


Ferritin   


 


Vitamin B12   


 


Folate   














  04/08/20 04/08/20





  02:40 06:15


 


WBC   5.5


 


RBC   2.37 L


 


Hgb   7.4 L


 


Hct   21.2 L


 


MCV   90


 


MCH   31.3


 


MCHC   35.0


 


RDW   19.2 H


 


Plt Count   340


 


Seg Neutrophils %  


 


Retic Count (auto)   1.59


 


Carbonic Acid  


 


HCO3/H2CO3 Ratio  


 


ABG pH  


 


ABG pCO2  


 


ABG pO2  


 


ABG HCO3  


 


ABG O2 Saturation  


 


ABG Base Excess  


 


VBG pH  


 


VBG pCO2  


 


VBG HCO3  


 


VBG Base Excess  


 


FiO2  


 


Sodium  141.9 


 


Potassium  3.4 L D 


 


Chloride  113 H 


 


Carbon Dioxide  20 L 


 


Anion Gap  9 


 


BUN  61 H D 


 


Creatinine  2.37 H 


 


Est GFR (African Amer)  25 L 


 


Glucose  162 H 


 


Calcium  6.2 L* 


 


Magnesium  1.4 L 


 


Iron  77.3 


 


TIBC  263 


 


% Saturation  29 


 


Ferritin  146.00 


 


Vitamin B12  514.0 


 


Folate  2.97 








                                        











  04/06/20 04/06/20 04/08/20





  18:10 19:30 02:40


 


Creatine Kinase    198 H


 


CK-MB (CK-2)  Cancelled  8.47 H 


 


Troponin I  Cancelled  0.076 


 


NT-Pro-B Natriuret Pep  Cancelled  2050 H 














  04/08/20





  02:40


 


Creatine Kinase 


 


CK-MB (CK-2)  4.47


 


Troponin I  0.070


 


NT-Pro-B Natriuret Pep 











Impressions: 


                                        





Acute Abdomen Series  04/06/20 17:41


IMPRESSION:  NO RADIOGRAPHIC EVIDENCE FOR ACUTE ABDOMINAL DISEASE.


UNCHANGED RIGHT STAGHORN CALCULUS.


 








Abdomen/Pelvis CT  04/06/20 19:36


IMPRESSION: 


 


No acute finding.


 


Other findings as described.


 














Assessment and Plan





- Diagnosis


(1) Acute renal failure


Qualifiers: 


   Acute renal failure type: unspecified   Qualified Code(s): N17.9 - Acute 

kidney failure, unspecified   


Is this a current diagnosis for this admission?: Yes   


Plan: 


Baseline creatinine seems to be around 1.  However 8.8 on admission.


Improving with IV fluids.  Likely secondary to dehydration.


We will maintain on sodium bicarb drip.  DC D5 normal saline.  Continue to 

monitor metabolic panel.


Patient having good urinary output.  Continue to monitor output via Patel.


Nephrology following-recommendations appreciated











(2) Hypotension


Qualifiers: 


   Hypotension type: other hypotension type   Qualified Code(s): I95.89 - Other 

hypotension   


Is this a current diagnosis for this admission?: Yes   


Plan: 


Likely secondary to renal failure and severe metabolic acidosis.  Currently 

improving and we will try to wean off dopamine drip today.








(3) Increased anion gap metabolic acidosis


Is this a current diagnosis for this admission?: Yes   


Plan: 


Likely secondary to ANNALEE.  Currently on sodium bicarb drip.  Improved 

significantly with last ABG showing pH of 7.32.








(4) Anemia due to blood loss, chronic


Is this a current diagnosis for this admission?: Yes   


Plan: 


Patient has had history of chronic bleeding in the ostomy.  Hemoglobin today is 

likely secondary to dilution from IV fluids.  Will monitor closely.  Iron 

studies within normal limits.  B12, folic acid also within normal limits.  

Anemia likely secondary to chronic blood loss and malignancy.








(5) Hyperkalemia, diminished renal excretion


Is this a current diagnosis for this admission?: Yes   


Plan: 


Resolved.  Discontinue Kayexalate.








(6) Colorectal cancer


Is this a current diagnosis for this admission?: Yes   


Plan: 


Diagnosed just a few months ago.  Patient states she has undergone 7 weeks of 

chemo and radiation.  Being followed by [facility] oncology.  Denies any k

nowledge about metastatic disease.








- Time


Time Spent with patient: 15-24 minutes

## 2020-04-09 LAB
ANION GAP SERPL CALC-SCNC: 5 MMOL/L (ref 5–19)
BUN SERPL-MCNC: 37 MG/DL (ref 7–20)
C DIFFICILE GDH: NEGATIVE
CALCIUM: 7.5 MG/DL (ref 8.4–10.2)
CHLORIDE SERPL-SCNC: 110 MMOL/L (ref 98–107)
CO2 SERPL-SCNC: 26 MMOL/L (ref 22–30)
ERYTHROCYTE [DISTWIDTH] IN BLOOD BY AUTOMATED COUNT: 19.3 % (ref 11.5–14)
GLUCOSE SERPL-MCNC: 94 MG/DL (ref 75–110)
HCT VFR BLD CALC: 23.6 % (ref 36–47)
HGB BLD-MCNC: 8.1 G/DL (ref 12–15.5)
MCH RBC QN AUTO: 31 PG (ref 27–33.4)
MCHC RBC AUTO-ENTMCNC: 34.2 G/DL (ref 32–36)
MCV RBC AUTO: 91 FL (ref 80–97)
PLATELET # BLD: 345 10^3/UL (ref 150–450)
POTASSIUM SERPL-SCNC: 4.2 MMOL/L (ref 3.6–5)
RBC # BLD AUTO: 2.61 10^6/UL (ref 3.72–5.28)
WBC # BLD AUTO: 7 10^3/UL (ref 4–10.5)

## 2020-04-09 RX ADMIN — DIPHENOXYLATE HYDROCHLORIDE AND ATROPINE SULFATE SCH TAB: 2.5; .025 TABLET ORAL at 17:27

## 2020-04-09 RX ADMIN — ACETAMINOPHEN PRN MG: 325 TABLET ORAL at 02:44

## 2020-04-09 RX ADMIN — SUCRALFATE SCH: 1 TABLET ORAL at 14:18

## 2020-04-09 RX ADMIN — Medication SCH: at 22:49

## 2020-04-09 RX ADMIN — LEVOTHYROXINE SODIUM SCH MG: 100 TABLET ORAL at 09:41

## 2020-04-09 RX ADMIN — LEVOTHYROXINE SODIUM SCH MG: 25 TABLET ORAL at 09:41

## 2020-04-09 RX ADMIN — DEXAMETHASONE SODIUM PHOSPHATE PRN MG: 10 INJECTION INTRAMUSCULAR; INTRAVENOUS at 22:01

## 2020-04-09 RX ADMIN — OXYBUTYNIN CHLORIDE SCH MG: 5 TABLET ORAL at 14:17

## 2020-04-09 RX ADMIN — Medication SCH MG: at 09:41

## 2020-04-09 RX ADMIN — PANTOPRAZOLE SODIUM SCH MG: 40 TABLET, DELAYED RELEASE ORAL at 17:27

## 2020-04-09 RX ADMIN — SUCRALFATE SCH GM: 1 TABLET ORAL at 17:27

## 2020-04-09 RX ADMIN — Medication SCH ML: at 14:17

## 2020-04-09 RX ADMIN — DIPHENOXYLATE HYDROCHLORIDE AND ATROPINE SULFATE SCH TAB: 2.5; .025 TABLET ORAL at 09:41

## 2020-04-09 RX ADMIN — SERTRALINE HYDROCHLORIDE SCH MG: 50 TABLET ORAL at 09:41

## 2020-04-09 RX ADMIN — POTASSIUM CHLORIDE SCH MEQ: 1.5 FOR SOLUTION ORAL at 09:42

## 2020-04-09 RX ADMIN — OXYBUTYNIN CHLORIDE SCH MG: 5 TABLET ORAL at 21:56

## 2020-04-09 RX ADMIN — PANTOPRAZOLE SODIUM SCH MG: 40 TABLET, DELAYED RELEASE ORAL at 05:13

## 2020-04-09 RX ADMIN — OXYBUTYNIN CHLORIDE SCH MG: 5 TABLET ORAL at 05:13

## 2020-04-09 RX ADMIN — SUCRALFATE SCH GM: 1 TABLET ORAL at 21:56

## 2020-04-09 RX ADMIN — Medication SCH ML: at 05:13

## 2020-04-09 RX ADMIN — DIPHENOXYLATE HYDROCHLORIDE AND ATROPINE SULFATE SCH TAB: 2.5; .025 TABLET ORAL at 14:17

## 2020-04-09 RX ADMIN — SUCRALFATE SCH GM: 1 TABLET ORAL at 07:56

## 2020-04-09 RX ADMIN — SODIUM CHLORIDE PRN MLS/HR: 9 INJECTION, SOLUTION INTRAVENOUS at 02:50

## 2020-04-09 NOTE — PDOC PROGRESS REPORT
Subjective


Progress Note for:: 04/09/20


Subjective:: 





Patient is doing well today.  She denies any shortness of breath.  He has 

discontinued the dopamine drip this morning.  Patient states that she usually 

has soft blood pressures even prior to all this and her systolic blood pressure 

is usually borderline.  Patient's would like to make some attempt to eat today.


Reason For Visit: 


ACUTE KIDNEY INJURY,ACUTE HYPERKALEMIA,HYPOTENSION








Physical Exam


Vital Signs: 


                                        











Temp Pulse Resp BP Pulse Ox


 


 98.1 F   98   14   92/69 L  100 


 


 04/09/20 07:51  04/09/20 11:00  04/09/20 10:12  04/09/20 11:00  04/09/20 10:12








                                 Intake & Output











 04/08/20 04/09/20 04/10/20





 06:59 06:59 06:59


 


Intake Total 4263 4461 41


 


Output Total 2000 1850 


 


Balance 2263 2611 41


 


Weight 78.5 kg 74.8 kg 











General appearance: PRESENT: no acute distress, cooperative


Neck exam: ABSENT: JVD


Respiratory exam: PRESENT: clear to auscultation susan, unlabored.  ABSENT: 

tachypnea, wheezes


Cardiovascular exam: PRESENT: RRR, +S1, +S2.  ABSENT: tachycardia


GI/Abdominal exam: PRESENT: normal bowel sounds, soft, other - Ostomy bag 

present.  ABSENT: rebound, rigid, tenderness


Neurological exam: PRESENT: alert, awake, oriented to person, oriented to place,

oriented to situation





Results


Laboratory Results: 


                                        





                                 04/09/20 05:15 





                                 04/09/20 05:15 





                                        











  04/08/20 04/09/20 04/09/20





  14:22 05:15 05:15


 


WBC   7.0 


 


RBC   2.61 L 


 


Hgb   8.1 L 


 


Hct   23.6 L 


 


MCV   91 


 


MCH   31.0 


 


MCHC   34.2 


 


RDW   19.3 H 


 


Plt Count   345 


 


Sodium  140.2   141.3


 


Potassium  3.2 L   4.2  D


 


Chloride  108 H   110 H


 


Carbon Dioxide  24   26


 


Anion Gap  8   5


 


BUN  48 H   37 H


 


Creatinine  1.58 H   1.21


 


Est GFR ( Amer)  40 L   54 L


 


Glucose  163 H   94


 


Calcium  7.5 L   7.5 L


 


Magnesium  2.2   1.6








                                        





04/06/20 19:40   Catheterized Urine   Urine Culture - Final


                            Yeast, Not Candida Albicans





                                        











  04/06/20 04/06/20 04/08/20





  18:10 19:30 02:40


 


Creatine Kinase    198 H


 


CK-MB (CK-2)  Cancelled  8.47 H 


 


Troponin I  Cancelled  0.076 


 


NT-Pro-B Natriuret Pep  Cancelled  2050 H 














  04/08/20 04/08/20





  02:40 10:07


 


Creatine Kinase   189 H


 


CK-MB (CK-2)  4.47 


 


Troponin I  0.070 


 


NT-Pro-B Natriuret Pep  











Impressions: 


                                        





Acute Abdomen Series  04/06/20 17:41


IMPRESSION:  NO RADIOGRAPHIC EVIDENCE FOR ACUTE ABDOMINAL DISEASE.


UNCHANGED RIGHT STAGHORN CALCULUS.


 








Abdomen/Pelvis CT  04/06/20 19:36


IMPRESSION: 


 


No acute finding.


 


Other findings as described.


 














Assessment and Plan





- Diagnosis


(1) Acute renal failure


Qualifiers: 


   Acute renal failure type: unspecified   Qualified Code(s): N17.9 - Acute 

kidney failure, unspecified   


Is this a current diagnosis for this admission?: Yes   


Plan: 


Renal function is now back to baseline.  Discontinue IV fluids.  Currently on 

sodium bicarbonate per minute discontinue now that acidemia seems to have 

resolved renal function is sufficient.  Discontinue Patel and perform voiding 

trials.


Nephrology following-recommendations appreciated











(2) Hypotension


Qualifiers: 


   Hypotension type: other hypotension type   Qualified Code(s): I95.89 - Other 

hypotension   


Is this a current diagnosis for this admission?: Yes   


Plan: 


Likely secondary to renal failure and severe metabolic acidosis.  Currently 

resolved.  Dopamine drip discontinued today.  Patient does endorse that she 

typically has soft blood pressures at baseline with systolic blood pressure 

often borderline.








(3) Increased anion gap metabolic acidosis


Is this a current diagnosis for this admission?: Yes   


Plan: 


Likely secondary to ANNALEE.  Seems to have resolved today.








(4) Anemia due to blood loss, chronic


Is this a current diagnosis for this admission?: Yes   


Plan: 


Patient has had history of chronic bleeding in the ostomy.  Hemoglobin now 

stabilizing.  Will monitor closely.  Iron studies within normal limits.  B12, 

folic acid also within normal limits.  Anemia likely secondary to chronic blood 

loss and malignancy.








(5) Hyperkalemia, diminished renal excretion


Is this a current diagnosis for this admission?: Yes   


Plan: 


Resolved with resolution of ANNALEE








(6) Colorectal cancer


Is this a current diagnosis for this admission?: Yes   


Plan: 


Diagnosed just a few months ago.  Patient states she has undergone 7 weeks of 

chemo and radiation.  Being followed by [facility] oncology.  Denies any 

knowledge about metastatic disease.  Continue outpatient follow-up.








(7) Moderate malnutrition


Is this a current diagnosis for this admission?: Yes   


Plan: 


Likely secondary to patient's underlying malignancy.  We will continue to 

encourage nutrition.  Dietary consult.  Supplementation.








- Time


Time Spent with patient: Less than 15 minutes

## 2020-04-10 LAB
ANION GAP SERPL CALC-SCNC: 6 MMOL/L (ref 5–19)
BUN SERPL-MCNC: 26 MG/DL (ref 7–20)
CALCIUM: 8.2 MG/DL (ref 8.4–10.2)
CHLORIDE SERPL-SCNC: 109 MMOL/L (ref 98–107)
CO2 SERPL-SCNC: 24 MMOL/L (ref 22–30)
ERYTHROCYTE [DISTWIDTH] IN BLOOD BY AUTOMATED COUNT: 19.8 % (ref 11.5–14)
GLUCOSE SERPL-MCNC: 90 MG/DL (ref 75–110)
HCT VFR BLD CALC: 22.1 % (ref 36–47)
HGB BLD-MCNC: 7.4 G/DL (ref 12–15.5)
MCH RBC QN AUTO: 30.9 PG (ref 27–33.4)
MCHC RBC AUTO-ENTMCNC: 33.4 G/DL (ref 32–36)
MCV RBC AUTO: 93 FL (ref 80–97)
PLATELET # BLD: 294 10^3/UL (ref 150–450)
POTASSIUM SERPL-SCNC: 4.1 MMOL/L (ref 3.6–5)
RBC # BLD AUTO: 2.39 10^6/UL (ref 3.72–5.28)
WBC # BLD AUTO: 4.7 10^3/UL (ref 4–10.5)

## 2020-04-10 RX ADMIN — Medication SCH ML: at 14:38

## 2020-04-10 RX ADMIN — OXYBUTYNIN CHLORIDE SCH MG: 5 TABLET ORAL at 21:46

## 2020-04-10 RX ADMIN — OXYBUTYNIN CHLORIDE SCH MG: 5 TABLET ORAL at 05:21

## 2020-04-10 RX ADMIN — SUCRALFATE SCH: 1 TABLET ORAL at 12:28

## 2020-04-10 RX ADMIN — OXYBUTYNIN CHLORIDE SCH MG: 5 TABLET ORAL at 14:37

## 2020-04-10 RX ADMIN — DIPHENOXYLATE HYDROCHLORIDE AND ATROPINE SULFATE SCH TAB: 2.5; .025 TABLET ORAL at 17:18

## 2020-04-10 RX ADMIN — SUCRALFATE SCH GM: 1 TABLET ORAL at 09:24

## 2020-04-10 RX ADMIN — SUCRALFATE SCH GM: 1 TABLET ORAL at 21:46

## 2020-04-10 RX ADMIN — SUCRALFATE SCH GM: 1 TABLET ORAL at 17:18

## 2020-04-10 RX ADMIN — Medication SCH MG: at 09:22

## 2020-04-10 RX ADMIN — SERTRALINE HYDROCHLORIDE SCH MG: 50 TABLET ORAL at 09:24

## 2020-04-10 RX ADMIN — DIPHENOXYLATE HYDROCHLORIDE AND ATROPINE SULFATE SCH TAB: 2.5; .025 TABLET ORAL at 14:37

## 2020-04-10 RX ADMIN — Medication PRN EACH: at 09:25

## 2020-04-10 RX ADMIN — DEXAMETHASONE SODIUM PHOSPHATE PRN MG: 10 INJECTION INTRAMUSCULAR; INTRAVENOUS at 05:18

## 2020-04-10 RX ADMIN — PANTOPRAZOLE SODIUM SCH MG: 40 TABLET, DELAYED RELEASE ORAL at 05:21

## 2020-04-10 RX ADMIN — PANTOPRAZOLE SODIUM SCH MG: 40 TABLET, DELAYED RELEASE ORAL at 17:17

## 2020-04-10 RX ADMIN — Medication SCH ML: at 21:46

## 2020-04-10 RX ADMIN — LEVOTHYROXINE SODIUM SCH MG: 25 TABLET ORAL at 09:23

## 2020-04-10 RX ADMIN — DIPHENOXYLATE HYDROCHLORIDE AND ATROPINE SULFATE SCH TAB: 2.5; .025 TABLET ORAL at 09:23

## 2020-04-10 RX ADMIN — LEVOTHYROXINE SODIUM SCH MG: 100 TABLET ORAL at 09:23

## 2020-04-10 RX ADMIN — Medication SCH ML: at 05:22

## 2020-04-10 NOTE — PDOC PROGRESS REPORT
Subjective


Progress Note for:: 04/10/20


Subjective:: 





Patient feels well today.  Has not been eating so much but did eat a bit 

yesterday.  Patient does not like the Ensure supplements that were given to her 

and she has refused to take them.  Patient states she will try to hydrate and 

eat other meals.  Patient denies any shortness of breath nausea or vomiting.  

Ostomy output has improved.  C. difficile testing negative.


Reason For Visit: 


ACUTE KIDNEY INJURY,ACUTE HYPERKALEMIA,HYPOTENSION








Physical Exam


Vital Signs: 


                                        











Temp Pulse Resp BP Pulse Ox


 


 97.7 F   73   16   116/77   92 


 


 04/10/20 07:19  04/10/20 07:19  04/10/20 07:19  04/10/20 07:19  04/10/20 07:19








                                 Intake & Output











 04/09/20 04/10/20 04/11/20





 06:59 06:59 06:59


 


Intake Total 4461 1139 


 


Output Total 1850 1300 


 


Balance 2611 -161 


 


Weight 74.8 kg 74.8 kg 











General appearance: PRESENT: no acute distress, cooperative


Neck exam: ABSENT: JVD


Respiratory exam: PRESENT: clear to auscultation susan, unlabored.  ABSENT: 

tachypnea, wheezes


Cardiovascular exam: PRESENT: RRR, +S1, +S2.  ABSENT: tachycardia


GI/Abdominal exam: PRESENT: normal bowel sounds, soft.  ABSENT: rebound, rigid, 

tenderness


Neurological exam: PRESENT: alert, awake, oriented to person, oriented to place,

oriented to time





Results


Laboratory Results: 


                                        





                                 04/10/20 05:30 





                                 04/10/20 05:30 





                                        











  04/10/20 04/10/20





  05:30 05:30


 


WBC  4.7 


 


RBC  2.39 L 


 


Hgb  7.4 L 


 


Hct  22.1 L 


 


MCV  93 


 


MCH  30.9 


 


MCHC  33.4 


 


RDW  19.8 H 


 


Plt Count  294 


 


Sodium   138.5


 


Potassium   4.1


 


Chloride   109 H


 


Carbon Dioxide   24


 


Anion Gap   6


 


BUN   26 H


 


Creatinine   1.04


 


Est GFR (African Amer)   > 60


 


Glucose   90


 


Calcium   8.2 L








                                        





04/06/20 19:40   Catheterized Urine   Urine Culture - Final


                            Yeast, Not Candida Albicans





                                        











  04/06/20 04/06/20 04/08/20





  18:10 19:30 02:40


 


Creatine Kinase    198 H


 


CK-MB (CK-2)  Cancelled  8.47 H 


 


Troponin I  Cancelled  0.076 


 


NT-Pro-B Natriuret Pep  Cancelled  2050 H 














  04/08/20 04/08/20





  02:40 10:07


 


Creatine Kinase   189 H


 


CK-MB (CK-2)  4.47 


 


Troponin I  0.070 


 


NT-Pro-B Natriuret Pep  











Impressions: 


                                        





Acute Abdomen Series  04/06/20 17:41


IMPRESSION:  NO RADIOGRAPHIC EVIDENCE FOR ACUTE ABDOMINAL DISEASE.


UNCHANGED RIGHT STAGHORN CALCULUS.


 








Abdomen/Pelvis CT  04/06/20 19:36


IMPRESSION: 


 


No acute finding.


 


Other findings as described.


 














Assessment and Plan





- Diagnosis


(1) Acute renal failure


Qualifiers: 


   Acute renal failure type: unspecified   Qualified Code(s): N17.9 - Acute 

kidney failure, unspecified   


Is this a current diagnosis for this admission?: Yes   


Plan: 


Renal function is now back to baseline.  Discontinue IV fluids.  Voiding ad

equately on all fluids.  Will recheck BMP in the morning encourage p.o. 

hydration











(2) Hypotension


Qualifiers: 


   Hypotension type: other hypotension type   Qualified Code(s): I95.89 - Other 

hypotension   


Is this a current diagnosis for this admission?: Yes   


Plan: 


Likely secondary to renal failure and severe metabolic acidosis.  Resolved blood

pressure currently holding steady.








(3) Increased anion gap metabolic acidosis


Is this a current diagnosis for this admission?: Yes   


Plan: 


Resolved








(4) Anemia due to blood loss, chronic


Is this a current diagnosis for this admission?: Yes   


Plan: 


Patient has had history of chronic bleeding in the ostomy.  Hemoglobin now 

stabilizing.  Will monitor closely.  Iron studies within normal limits.  B12, 

folic acid also within normal limits.  Anemia likely secondary to chronic blood 

loss and malignancy.  Will have to recheck CBC tomorrow.  No evidence of bleed 

currently hemoglobin levels seem to fluctuate.








(5) Hyperkalemia, diminished renal excretion


Is this a current diagnosis for this admission?: Yes   


Plan: 


Resolved with resolution of ANNALEE








(6) Colorectal cancer


Is this a current diagnosis for this admission?: Yes   





(7) Moderate malnutrition


Is this a current diagnosis for this admission?: Yes   


Plan: 


Likely secondary to patient's underlying malignancy.  We will continue to 

encourage nutrition.  Dietary consult.  Patient refusing Ensure supplements.  

Trial of Megace.








- Time


Time Spent with patient: 15-24 minutes

## 2020-04-11 VITALS — SYSTOLIC BLOOD PRESSURE: 114 MMHG | DIASTOLIC BLOOD PRESSURE: 75 MMHG

## 2020-04-11 LAB
ANION GAP SERPL CALC-SCNC: 3 MMOL/L (ref 5–19)
BUN SERPL-MCNC: 18 MG/DL (ref 7–20)
CALCIUM: 8.1 MG/DL (ref 8.4–10.2)
CHLORIDE SERPL-SCNC: 107 MMOL/L (ref 98–107)
CO2 SERPL-SCNC: 27 MMOL/L (ref 22–30)
ERYTHROCYTE [DISTWIDTH] IN BLOOD BY AUTOMATED COUNT: 18.4 % (ref 11.5–14)
ERYTHROCYTE [DISTWIDTH] IN BLOOD BY AUTOMATED COUNT: 19 % (ref 11.5–14)
GLUCOSE SERPL-MCNC: 82 MG/DL (ref 75–110)
HCT VFR BLD CALC: 21.6 % (ref 36–47)
HCT VFR BLD CALC: 31.5 % (ref 36–47)
HGB BLD-MCNC: 10.7 G/DL (ref 12–15.5)
HGB BLD-MCNC: 7.2 G/DL (ref 12–15.5)
MCH RBC QN AUTO: 30.7 PG (ref 27–33.4)
MCH RBC QN AUTO: 30.7 PG (ref 27–33.4)
MCHC RBC AUTO-ENTMCNC: 33.4 G/DL (ref 32–36)
MCHC RBC AUTO-ENTMCNC: 33.9 G/DL (ref 32–36)
MCV RBC AUTO: 91 FL (ref 80–97)
MCV RBC AUTO: 92 FL (ref 80–97)
PLATELET # BLD: 241 10^3/UL (ref 150–450)
PLATELET # BLD: 276 10^3/UL (ref 150–450)
POTASSIUM SERPL-SCNC: 3.8 MMOL/L (ref 3.6–5)
RBC # BLD AUTO: 2.35 10^6/UL (ref 3.72–5.28)
RBC # BLD AUTO: 3.48 10^6/UL (ref 3.72–5.28)
WBC # BLD AUTO: 2.7 10^3/UL (ref 4–10.5)
WBC # BLD AUTO: 4.2 10^3/UL (ref 4–10.5)

## 2020-04-11 PROCEDURE — 30233N1 TRANSFUSION OF NONAUTOLOGOUS RED BLOOD CELLS INTO PERIPHERAL VEIN, PERCUTANEOUS APPROACH: ICD-10-PCS | Performed by: INTERNAL MEDICINE

## 2020-04-11 RX ADMIN — SUCRALFATE SCH: 1 TABLET ORAL at 12:27

## 2020-04-11 RX ADMIN — DIPHENOXYLATE HYDROCHLORIDE AND ATROPINE SULFATE SCH TAB: 2.5; .025 TABLET ORAL at 17:44

## 2020-04-11 RX ADMIN — PANTOPRAZOLE SODIUM SCH MG: 40 TABLET, DELAYED RELEASE ORAL at 06:55

## 2020-04-11 RX ADMIN — SUCRALFATE SCH GM: 1 TABLET ORAL at 15:07

## 2020-04-11 RX ADMIN — OXYBUTYNIN CHLORIDE SCH MG: 5 TABLET ORAL at 15:06

## 2020-04-11 RX ADMIN — Medication SCH: at 14:57

## 2020-04-11 RX ADMIN — SERTRALINE HYDROCHLORIDE SCH MG: 50 TABLET ORAL at 10:41

## 2020-04-11 RX ADMIN — SUCRALFATE SCH GM: 1 TABLET ORAL at 08:40

## 2020-04-11 RX ADMIN — LEVOTHYROXINE SODIUM SCH MG: 100 TABLET ORAL at 10:41

## 2020-04-11 RX ADMIN — OXYBUTYNIN CHLORIDE SCH MG: 5 TABLET ORAL at 06:55

## 2020-04-11 RX ADMIN — Medication PRN EACH: at 00:04

## 2020-04-11 RX ADMIN — DIPHENOXYLATE HYDROCHLORIDE AND ATROPINE SULFATE SCH TAB: 2.5; .025 TABLET ORAL at 10:41

## 2020-04-11 RX ADMIN — Medication SCH MG: at 10:41

## 2020-04-11 RX ADMIN — Medication SCH ML: at 06:55

## 2020-04-11 RX ADMIN — LEVOTHYROXINE SODIUM SCH MG: 25 TABLET ORAL at 10:41

## 2020-04-11 RX ADMIN — PANTOPRAZOLE SODIUM SCH MG: 40 TABLET, DELAYED RELEASE ORAL at 17:44

## 2020-04-11 RX ADMIN — DIPHENOXYLATE HYDROCHLORIDE AND ATROPINE SULFATE SCH TAB: 2.5; .025 TABLET ORAL at 15:07

## 2020-04-11 NOTE — PDOC DISCHARGE SUMMARY
Impression





- Admit/DC Date/PCP


Admission Date/Primary Care Provider: 


  04/06/20 22:21





  GEORGIA ELLIS MD





Discharge Date: 04/11/20





- Discharge Diagnosis


(1) Acute renal failure


Is this a current diagnosis for this admission?: Yes   





(2) Hypotension


Is this a current diagnosis for this admission?: Yes   





(3) Increased anion gap metabolic acidosis


Is this a current diagnosis for this admission?: Yes   





(4) Anemia due to blood loss, chronic


Is this a current diagnosis for this admission?: Yes   





(5) Hyperkalemia, diminished renal excretion


Is this a current diagnosis for this admission?: Yes   





(6) Colorectal cancer


Is this a current diagnosis for this admission?: Yes   





(7) Moderate malnutrition


Is this a current diagnosis for this admission?: Yes   





- Additional Information


Resuscitation Status: Full Code


Discharge Diet: Regular


Discharge Activity: Activity As Tolerated


Referrals: 


GEORGIA ELLIS MD [Primary Care Provider] -  (Follow-up within 1 to 2 weeks - 

put request in Weekend followup book.)


Prescriptions: 


Benzocaine/Menthol [Chloraseptic Sore Throat Lozenge] 1 each BUCCAL Q4HP PRN #20

lozenge


 PRN Reason: For Sore Throat


Loperamide HCl [Imodium 2 mg Capsule] 2 mg PO Q6HP PRN #15 capsule


 PRN Reason: Diarrhea


Megestrol Acetate [Megace Chen 400 mg/10 ml Udcup] 800 mg PO DAILY #60 udc


Home Medications: 








Levothyroxine Sodium 125 mcg PO DAILY 03/09/12 


Oxybutynin Chloride [Ditropan 5 mg Tablet] 5 mg PO TID #0 03/09/12 


Pravastatin Sodium [Pravachol] 20 mg PO QHS 12/28/16 


Omeprazole 20 mg PO DAILY 07/09/18 


Sertraline HCl [Zoloft] 150 mg PO DAILY 09/20/19 


Atenolol [Tenormin] 25 mg PO QAM 01/23/20 


Hydroxyzine Pamoate [Vistaril 25 mg Capsule] 25 mg PO Q8HP PRN 02/28/20 


Diphenoxylate HCl/Atrop Sulf [Lomotil 2.5 mg Tablet] 2 tab PO TID 04/07/20 


Hydroxyzine Pamoate [Vistaril 25 mg Capsule] 25 mg PO Q8HP PRN 04/07/20 


Benzocaine/Menthol [Chloraseptic Sore Throat Lozenge] 1 each BUCCAL Q4HP PRN #20

lozenge 04/11/20 


Loperamide HCl [Imodium 2 mg Capsule] 2 mg PO Q6HP PRN #15 capsule 04/11/20 


Megestrol Acetate [Megace Chen 400 mg/10 ml Udcup] 800 mg PO DAILY #60 udc 

04/11/20 











History of Present Illiness


History of Present Illness: 


SERGEI BAKER is a 64 year old female who presented to the emergency 

department with a one-week history of generalized weakness.  She admits 

gradually worsening generalized weakness over the last week since she was 

discharged from a [facility].  Her weakness has become moderate to 

severe and has been accompanied by dark stools in her ileostomy bag.  Her 

weakness is made worse by any activities or exertion.  She has chronic lower GI 

bleeding and chronic renal failure.  She expresses the wish to be DNR/DNI and 

does not want any exceptional measures undertaken, she would like to just receiv

e some IV fluids because she feels she is dehydrated and has experienced the 

same symptoms in the past.  She also admits chronic hypotension and does not 

wish to be treated with vasopressors.  She denies other associated or 

accompanying signs and symptoms.  She has not identified any additional 

aggravating or ameliorating factors for her generalized weakness.  In the 

emergency room she was found to have acute on chronic renal failure with a 

potassium of 6.7 and was noted to be intermittently hypotensive.  She was 

subsequently admitted for further evaluation and treatment within the parameters

that she has outlined for her care.








Hospital Course


Hospital Course: 


Patient was admitted to the hospital with acute metabolic encephalopathy, 

hypotension secondary to high anion gap metabolic acidosis caused by severe 

acute renal failure.  Her creatinine upon admission was 8.8 whereas her baseline

was around 1.  Patient had been very dehydrated which was likely the cause as 

her p.o. intake had significantly decreased.  Patient was given aggressive IV 

fluid hydration and also placed on sodium bicarb drip.  She also required some 

amps of sodium bicarb pushes as her pH was 7.17.  Patient also required dopamine

to give her blood pressure elevated which is administered peripherally.  

Suspected to have been in hypovolemic shock or due to metabolic acidosis.  

Patient's creatinine later normalized after a few days of aggressive hydration 

and correction of acidemia.  Patient's blood pressure also normalized and 

patient was able to be weaned off dopamine drip.  Fluids were later discontinued

and patient has been encouraged on p.o. hydration.  Creatinine has held steady 

and improved despite discontinuation of IV fluids.  Patient was also tested for 

C. difficile for increased ostomy output from diarrhea.  C. difficile testing 

was negative and patient has been placed on Imodium as needed.  Patient also has

moderate malnutrition secondary to her colorectal cancer and has been placed on 

appetite stimulant Megace on a trial basis.  Patient's ramipril has been held 

and patient advised not to resume unless her blood pressure starts to run high 

after talking with her primary care provider.  Patient was also given a unit of 

blood during this admission.  She does have a chronic history of some bleeding 

from her lower GI but has not had any during this hospitalization.  Iron studies

were also normal including B12 and folic acid.  High suspicion the patient's 

anemia is likely from chronic disease.  Patient will be following with her 

primary care provider for repeat labs within 1 to 2 weeks.  Home health is to be

resumed on discharge.  Patient's appetite seems to have improved.





Physical Exam


Vital Signs: 


                                        











Temp Pulse Resp BP Pulse Ox


 


 98.1 F   70   17   116/94 H  99 


 


 04/11/20 07:30  04/11/20 08:36  04/11/20 07:30  04/11/20 08:36  04/11/20 07:30








                                 Intake & Output











 04/10/20 04/11/20 04/12/20





 06:59 06:59 06:59


 


Intake Total 1139 756 


 


Output Total 1300 2325 175


 


Balance -161 -1569 -175


 


Weight 74.8 kg 69.7 kg 69.7 kg











General appearance: PRESENT: no acute distress, cooperative


Neck exam: ABSENT: JVD


Respiratory exam: PRESENT: symmetrical, unlabored.  ABSENT: accessory muscle u

se, retraction, tachypnea


GI/Abdominal exam: PRESENT: soft, other - Ostomy with nonbloody greenish fluid 

and.  ABSENT: rebound, rigid, tenderness


Neurological exam: PRESENT: alert, awake, oriented to person, oriented to place,

oriented to time





Results


Laboratory Results: 


                                        











WBC  2.7 10^3/uL (4.0-10.5)  L D 04/11/20  05:50    


 


RBC  2.35 10^6/uL (3.72-5.28)  L  04/11/20  05:50    


 


Hgb  7.2 g/dL (12.0-15.5)  L  04/11/20  05:50    


 


Hct  21.6 % (36.0-47.0)  L  04/11/20  05:50    


 


MCV  92 fl (80-97)   04/11/20  05:50    


 


MCH  30.7 pg (27.0-33.4)   04/11/20  05:50    


 


MCHC  33.4 g/dL (32.0-36.0)   04/11/20  05:50    


 


RDW  19.0 % (11.5-14.0)  H  04/11/20  05:50    


 


Plt Count  241 10^3/uL (150-450)   04/11/20  05:50    


 


Lymph % (Auto)  7.7 % (13-45)  L  04/06/20  19:30    


 


Mono % (Auto)  12.0 % (3-13)   04/06/20  19:30    


 


Eos % (Auto)  0.8 % (0-6)   04/06/20  19:30    


 


Baso % (Auto)  0.9 % (0-2)   04/06/20  19:30    


 


Reticulocyte #  0.038 10^6/uL (0.028-0.122)   04/08/20  06:15    


 


Absolute Neuts (auto)  5.9 10^3/uL (1.7-8.2)   04/06/20  19:30    


 


Absolute Lymphs (auto)  0.6 10^3/uL (0.5-4.7)   04/06/20  19:30    


 


Absolute Monos (auto)  0.9 10^3/uL (0.1-1.4)   04/06/20  19:30    


 


Absolute Eos (auto)  0.1 10^3/uL (0.0-0.6)   04/06/20  19:30    


 


Absolute Basos (auto)  0.1 10^3/uL (0.0-0.2)   04/06/20  19:30    


 


Seg Neutrophils %  78.6 % (42-78)  H  04/06/20  19:30    


 


Platelet Estimate  Cancelled   04/06/20  18:10    


 


Retic Count (auto)  1.59 % (0.66-2.85)   04/08/20  06:15    


 


PT  14.9 SEC (11.4-15.4)   04/06/20  18:10    


 


INR  1.16   04/06/20  18:10    


 


APTT  29.7 SEC (23.5-35.8)   04/06/20  18:10    


 


Carbonic Acid  0.92 mmol/L (1.05-1.35)  L  04/07/20  17:30    


 


HCO3/H2CO3 Ratio  16:1   04/07/20  17:30    


 


ABG pH  7.32  (7.35-7.45)  L  04/07/20  17:30    


 


ABG pCO2  30.4 mmHg (35-45)  L  04/07/20  17:30    


 


ABG pO2  103.9 mmHg ()  H  04/07/20  17:30    


 


ABG HCO3  15.4 mmol/L (20-24)  L  04/07/20  17:30    


 


ABG Total CO2  16.3 mmol/L (21-25)  L  04/07/20  17:30    


 


ABG O2 Saturation  97.5 % (94-98)   04/07/20  17:30    


 


ABG Base Excess  -9.5 mmol/L  04/07/20  17:30    


 


VBG pH  7.17  (7.30-7.42)  L*  04/07/20  10:33    


 


VBG pCO2  31.0 mmHg (35-63)  L  04/07/20  10:33    


 


VBG HCO3  11.0 mmol/L (20-32)  L  04/07/20  10:33    


 


VBG Base Excess  -16.2 mmol/L  04/07/20  10:33    


 


FiO2  1.5   04/07/20  17:30    


 


Sodium  137.4 mmol/L (137-145)   04/11/20  05:50    


 


Potassium  3.8 mmol/L (3.6-5.0)   04/11/20  05:50    


 


Chloride  107 mmol/L ()   04/11/20  05:50    


 


Carbon Dioxide  27 mmol/L (22-30)   04/11/20  05:50    


 


Anion Gap  3  (5-19)  L  04/11/20  05:50    


 


BUN  18 mg/dL (7-20)   04/11/20  05:50    


 


Creatinine  0.91 mg/dL (0.52-1.25)   04/11/20  05:50    


 


Est GFR ( Amer)  > 60  (>60)   04/11/20  05:50    


 


Est GFR (Non-Af Amer)  Cancelled   04/06/20  18:10    


 


Est GFR (MDRD) Non-Af  > 60  (>60)   04/11/20  05:50    


 


Glucose  82 mg/dL ()   04/11/20  05:50    


 


POC Glucose  186 mg/dL ()  H  04/07/20  21:23    


 


Lactic Acid  0.8 mmol/L (0.7-2.1)   04/06/20  19:30    


 


Calcium  8.1 mg/dL (8.4-10.2)  L  04/11/20  05:50    


 


Magnesium  1.6 mg/dL (1.6-2.3)   04/09/20  05:15    


 


Iron  77.3 ug/dL ()   04/08/20  02:40    


 


TIBC  263 ug/dL (250-450)   04/08/20  02:40    


 


% Saturation  29 %  04/08/20  02:40    


 


Ferritin  146.00 ng/mL (11.1-264.0)   04/08/20  02:40    


 


Total Bilirubin  0.2 mg/dL (0.2-1.3)   04/06/20  19:30    


 


Direct Bilirubin  0.0 mg/dL (0.0-0.4)   04/06/20  19:30    


 


Neonat Total Bilirubin  Not Reportable   04/06/20  19:30    


 


Neonat Direct Bilirubin  Not Reportable   04/06/20  19:30    


 


Neonat Indirect Bili  Not Reportable   04/06/20  19:30    


 


AST  16 U/L (14-36)   04/06/20  19:30    


 


ALT  14 U/L (<35)   04/06/20  19:30    


 


Alkaline Phosphatase  83 U/L ()   04/06/20  19:30    


 


Creatine Kinase  189 U/L ()  H  04/08/20  10:07    


 


CK-MB (CK-2)  4.47 ng/mL (<4.55)   04/08/20  02:40    


 


Troponin I  0.070 ng/mL  04/08/20  02:40    


 


NT-Pro-B Natriuret Pep  2050 pg/mL (<125)  H  04/06/20  19:30    


 


Total Protein  6.1 g/dL (6.3-8.2)  L  04/06/20  19:30    


 


Albumin  2.5 g/dL (3.5-5.0)  L  04/08/20  10:07    


 


EGFR   Cancelled   04/06/20  18:10    


 


Vitamin B12  514.0 pg/mL (239-931)   04/08/20  02:40    


 


Folate  2.97 ng/mL (>2.76)   04/08/20  02:40    


 


TSH  0.42 uIU/mL (0.47-4.68)  L  04/07/20  07:18    


 


Free T3 pg/mL  3.42 pg/mL (2.77-5.27)   04/07/20  07:18    


 


Cortisol AM Sample  16.10 ug/dL (4.46-22.7)   04/09/20  05:15    


 


Urine Color  YELLOW   04/06/20  19:40    


 


Urine Appearance  CLOUDY   04/06/20  19:40    


 


Urine pH  5.0  (5.0-9.0)   04/06/20  19:40    


 


Ur Specific Gravity  1.017   04/06/20  19:40    


 


Urine Protein  100 mg/dL (NEGATIVE)  H  04/06/20  19:40    


 


Urine Glucose (UA)  NEGATIVE mg/dL (NEGATIVE)   04/06/20  19:40    


 


Urine Ketones  NEGATIVE mg/dL (NEGATIVE)   04/06/20  19:40    


 


Urine Blood  MODERATE  (NEGATIVE)  H  04/06/20  19:40    


 


Urine Nitrite  NEGATIVE  (NEGATIVE)   04/06/20  19:40    


 


Urine Bilirubin  NEGATIVE  (NEGATIVE)   04/06/20  19:40    


 


Urine Urobilinogen  NEGATIVE mg/dL (<2.0)   04/06/20  19:40    


 


Ur Leukocyte Esterase  LARGE  (NEGATIVE)  H  04/06/20  19:40    


 


Urine WBC (Auto)  33 /HPF  04/06/20  19:40    


 


Urine RBC (Auto)  30 /HPF  04/06/20  19:40    


 


Urine Bacteria (Auto)  TRACE /HPF  04/06/20  19:40    


 


Squamous Epi Cells Auto  <1 /HPF  04/06/20  19:40    


 


Urine Mucus (Auto)  RARE /LPF  04/06/20  19:40    


 


Urine Yeast (Budding)  PRESENT /HPF  04/06/20  19:40    


 


Urine Ascorbic Acid  NEGATIVE  (NEGATIVE)   04/06/20  19:40    


 


POC Stool Occult Blood  POSITIVE  (NEGATIVE)   04/06/20  19:52    


 


Stl C. Difficile GDH Ag  NEGATIVE  (NEGATIVE)   04/09/20  09:45    


 


Stl C.difficile Tox A&B  NEGATIVE  (NEGATIVE)   04/09/20  09:45    


 


Slides for Path Review  Cancelled   04/06/20  18:10    


 


Blood Type  B POSITIVE   04/06/20  19:30    


 


Antibody Screen  NEGATIVE   04/06/20  19:30    


 


Crossmatch  See Detail   04/11/20  09:35    








                                        











  04/06/20 04/06/20 04/08/20





  18:10 19:30 02:40


 


CK-MB (CK-2)  Cancelled  8.47 H  4.47


 


Troponin I  Cancelled  0.076  0.070


 


NT-Pro-B Natriuret Pep  Cancelled  2050 H 











Impressions: 


                                        





Acute Abdomen Series  04/06/20 17:41


IMPRESSION:  NO RADIOGRAPHIC EVIDENCE FOR ACUTE ABDOMINAL DISEASE.


UNCHANGED RIGHT STAGHORN CALCULUS.


 








Abdomen/Pelvis CT  04/06/20 19:36


IMPRESSION: 


 


No acute finding.


 


Other findings as described.


 














Plan


Time Spent: Greater than 30 Minutes





Stroke


Is this a Stroke Patient?: No





Acute Heart Failure





- **


Is this a Heart Failure Patient?: No

## 2020-06-16 ENCOUNTER — HOSPITAL ENCOUNTER (INPATIENT)
Dept: HOSPITAL 62 - ER | Age: 65
LOS: 4 days | Discharge: HOME | DRG: 683 | End: 2020-06-20
Attending: INTERNAL MEDICINE | Admitting: FAMILY MEDICINE
Payer: MEDICAID

## 2020-06-16 DIAGNOSIS — I95.89: ICD-10-CM

## 2020-06-16 DIAGNOSIS — F32.9: ICD-10-CM

## 2020-06-16 DIAGNOSIS — E86.0: ICD-10-CM

## 2020-06-16 DIAGNOSIS — I25.10: ICD-10-CM

## 2020-06-16 DIAGNOSIS — Z85.038: ICD-10-CM

## 2020-06-16 DIAGNOSIS — Z83.438: ICD-10-CM

## 2020-06-16 DIAGNOSIS — J44.9: ICD-10-CM

## 2020-06-16 DIAGNOSIS — E89.0: ICD-10-CM

## 2020-06-16 DIAGNOSIS — Z88.8: ICD-10-CM

## 2020-06-16 DIAGNOSIS — Z90.49: ICD-10-CM

## 2020-06-16 DIAGNOSIS — Z91.013: ICD-10-CM

## 2020-06-16 DIAGNOSIS — Z91.041: ICD-10-CM

## 2020-06-16 DIAGNOSIS — K21.9: ICD-10-CM

## 2020-06-16 DIAGNOSIS — Z79.82: ICD-10-CM

## 2020-06-16 DIAGNOSIS — E78.5: ICD-10-CM

## 2020-06-16 DIAGNOSIS — Z83.3: ICD-10-CM

## 2020-06-16 DIAGNOSIS — N17.9: Primary | ICD-10-CM

## 2020-06-16 DIAGNOSIS — Z79.890: ICD-10-CM

## 2020-06-16 DIAGNOSIS — M19.90: ICD-10-CM

## 2020-06-16 DIAGNOSIS — Z88.2: ICD-10-CM

## 2020-06-16 DIAGNOSIS — Z82.49: ICD-10-CM

## 2020-06-16 DIAGNOSIS — D50.0: ICD-10-CM

## 2020-06-16 DIAGNOSIS — E83.42: ICD-10-CM

## 2020-06-16 DIAGNOSIS — E87.2: ICD-10-CM

## 2020-06-16 DIAGNOSIS — Z87.891: ICD-10-CM

## 2020-06-16 DIAGNOSIS — I10: ICD-10-CM

## 2020-06-16 DIAGNOSIS — E87.6: ICD-10-CM

## 2020-06-16 DIAGNOSIS — Z79.899: ICD-10-CM

## 2020-06-16 DIAGNOSIS — Z93.2: ICD-10-CM

## 2020-06-16 DIAGNOSIS — I25.2: ICD-10-CM

## 2020-06-16 LAB
ADD MANUAL DIFF: NO
ALBUMIN SERPL-MCNC: 4.7 G/DL (ref 3.5–5)
ALP SERPL-CCNC: 172 U/L (ref 38–126)
ANION GAP SERPL CALC-SCNC: 16 MMOL/L (ref 5–19)
AST SERPL-CCNC: 23 U/L (ref 14–36)
BASOPHILS # BLD AUTO: 0.1 10^3/UL (ref 0–0.2)
BASOPHILS NFR BLD AUTO: 0.7 % (ref 0–2)
BILIRUB DIRECT SERPL-MCNC: 0 MG/DL (ref 0–0.4)
BILIRUB SERPL-MCNC: 0.3 MG/DL (ref 0.2–1.3)
BUN SERPL-MCNC: 61 MG/DL (ref 7–20)
CALCIUM: 10.2 MG/DL (ref 8.4–10.2)
CHLORIDE SERPL-SCNC: 108 MMOL/L (ref 98–107)
CK SERPL-CCNC: 206 U/L (ref 30–135)
CO2 SERPL-SCNC: 11 MMOL/L (ref 22–30)
EOSINOPHIL # BLD AUTO: 0 10^3/UL (ref 0–0.6)
EOSINOPHIL NFR BLD AUTO: 0.3 % (ref 0–6)
ERYTHROCYTE [DISTWIDTH] IN BLOOD BY AUTOMATED COUNT: 15.5 % (ref 11.5–14)
FREE T4 (FREE THYROXINE): 0.96 NG/DL (ref 0.78–2.19)
GLUCOSE SERPL-MCNC: 87 MG/DL (ref 75–110)
HCT VFR BLD CALC: 39.6 % (ref 36–47)
HGB BLD-MCNC: 13.2 G/DL (ref 12–15.5)
LYMPHOCYTES # BLD AUTO: 0.9 10^3/UL (ref 0.5–4.7)
LYMPHOCYTES NFR BLD AUTO: 8.5 % (ref 13–45)
MCH RBC QN AUTO: 31.3 PG (ref 27–33.4)
MCHC RBC AUTO-ENTMCNC: 33.5 G/DL (ref 32–36)
MCV RBC AUTO: 94 FL (ref 80–97)
MONOCYTES # BLD AUTO: 0.7 10^3/UL (ref 0.1–1.4)
MONOCYTES NFR BLD AUTO: 7.1 % (ref 3–13)
NEUTROPHILS # BLD AUTO: 8.6 10^3/UL (ref 1.7–8.2)
NEUTS SEG NFR BLD AUTO: 83.4 % (ref 42–78)
NT PRO BNP: 844 PG/ML (ref ?–125)
PLATELET # BLD: 385 10^3/UL (ref 150–450)
POTASSIUM SERPL-SCNC: 4.6 MMOL/L (ref 3.6–5)
PROT SERPL-MCNC: 8.2 G/DL (ref 6.3–8.2)
RBC # BLD AUTO: 4.23 10^6/UL (ref 3.72–5.28)
T3FREE SERPL-MCNC: 2.5 PG/ML (ref 2.77–5.27)
TOTAL CELLS COUNTED % (AUTO): 100 %
TROPONIN I SERPL-MCNC: 0.1 NG/ML
WBC # BLD AUTO: 10.3 10^3/UL (ref 4–10.5)

## 2020-06-16 PROCEDURE — 86850 RBC ANTIBODY SCREEN: CPT

## 2020-06-16 PROCEDURE — 82550 ASSAY OF CK (CPK): CPT

## 2020-06-16 PROCEDURE — 96360 HYDRATION IV INFUSION INIT: CPT

## 2020-06-16 PROCEDURE — 93010 ELECTROCARDIOGRAM REPORT: CPT

## 2020-06-16 PROCEDURE — 99285 EMERGENCY DEPT VISIT HI MDM: CPT

## 2020-06-16 PROCEDURE — 83880 ASSAY OF NATRIURETIC PEPTIDE: CPT

## 2020-06-16 PROCEDURE — 85027 COMPLETE CBC AUTOMATED: CPT

## 2020-06-16 PROCEDURE — 80053 COMPREHEN METABOLIC PANEL: CPT

## 2020-06-16 PROCEDURE — 84439 ASSAY OF FREE THYROXINE: CPT

## 2020-06-16 PROCEDURE — 86901 BLOOD TYPING SEROLOGIC RH(D): CPT

## 2020-06-16 PROCEDURE — 71045 X-RAY EXAM CHEST 1 VIEW: CPT

## 2020-06-16 PROCEDURE — 85025 COMPLETE CBC W/AUTO DIFF WBC: CPT

## 2020-06-16 PROCEDURE — 84443 ASSAY THYROID STIM HORMONE: CPT

## 2020-06-16 PROCEDURE — 86900 BLOOD TYPING SEROLOGIC ABO: CPT

## 2020-06-16 PROCEDURE — 94640 AIRWAY INHALATION TREATMENT: CPT

## 2020-06-16 PROCEDURE — 84481 FREE ASSAY (FT-3): CPT

## 2020-06-16 PROCEDURE — 93005 ELECTROCARDIOGRAM TRACING: CPT

## 2020-06-16 PROCEDURE — 84484 ASSAY OF TROPONIN QUANT: CPT

## 2020-06-16 PROCEDURE — 36415 COLL VENOUS BLD VENIPUNCTURE: CPT

## 2020-06-16 PROCEDURE — 83735 ASSAY OF MAGNESIUM: CPT

## 2020-06-16 PROCEDURE — 80048 BASIC METABOLIC PNL TOTAL CA: CPT

## 2020-06-16 PROCEDURE — 81001 URINALYSIS AUTO W/SCOPE: CPT

## 2020-06-16 RX ADMIN — LEVALBUTEROL HYDROCHLORIDE SCH MG: 1.25 SOLUTION RESPIRATORY (INHALATION) at 23:48

## 2020-06-16 RX ADMIN — HEPARIN SODIUM SCH UNIT: 5000 INJECTION, SOLUTION INTRAVENOUS; SUBCUTANEOUS at 21:16

## 2020-06-16 RX ADMIN — Medication SCH: at 21:21

## 2020-06-16 RX ADMIN — ACETAMINOPHEN PRN MG: 325 TABLET ORAL at 22:33

## 2020-06-16 RX ADMIN — SODIUM CHLORIDE, SODIUM LACTATE, POTASSIUM CHLORIDE, AND CALCIUM CHLORIDE PRN MLS/HR: .6; .31; .03; .02 INJECTION, SOLUTION INTRAVENOUS at 21:16

## 2020-06-16 NOTE — EKG REPORT
SEVERITY:- ABNORMAL ECG -

SINUS ARRHYTHMIA, RATE 62-89

INFERIOR INFARCT, AGE INDETERMINATE

CONSIDER ANTERIOR INFARCT

:

Confirmed by: Oleksandr Abel MD 16-Jun-2020 20:05:20

## 2020-06-16 NOTE — ER DOCUMENT REPORT
Entered by ROCÍO CADENA SCRIBE  06/16/20 1601 





Acting as scribe for:ADALBERTO ESCOBAR MD





ED General





- General


Chief Complaint: Shortness Of Breath


Stated Complaint: SHORT OF BREATH


Time Seen by Provider: 06/16/20 15:29


Mode of Arrival: Ambulatory


Information source: Patient, [facility] Records


Notes: 





This 64 year old female patient presents to the emergency department today with 

complaints of shortness of breath for the last week and a half. Patient states 

that if she is sitting still she seems to be fine but if as soon as she stands 

up to do anything she becomes short of breath. Patient also mentions that she 

has noticed a pasty bloody substance in her ostomy bag which is unchanged from 

the last few weeks. Patient states that she has also had nausea and vomiting but

only in the morning when it is time to take her medications which is an issue 

that she has had for quite some time. 








TRAVEL OUTSIDE OF THE [place] IN LAST 30 DAYS: No





- Related Data


Allergies/Adverse Reactions: 


                                        





shellfish derived Allergy (Severe, Verified 01/22/20 02:17)


   Hives


iodine [Iodine] Allergy (Intermediate, Verified 01/22/20 02:17)


   Rash


Sulfa (Sulfonamide Antibiotics) Allergy (Intermediate, Verified 01/22/20 02:17)


   Hives


Iodinated Contrast Media Adverse Reaction (Intermediate, Verified 01/22/20 

02:17)


   Nausea











Past Medical History





- General


Information source: Patient, [facility] Records





- Social History


Smoking Status: Former Smoker


Cigarette use (# per day): No


Chew tobacco use (# tins/day): No


Smoking Education Provided: No


Frequency of alcohol use: None


Drug Abuse: None


Lives with: Family


Family History: CAD, DM, Hyperlipidemia, Hypertension





- Past Medical History


Cardiac Medical History: Reports: Hx Heart Attack - 2010, Hx Hypertension


Pulmonary Medical History: Reports: Hx Asthma, Hx Bronchitis, Hx COPD, Hx 

Pneumonia - 2015


Endocrine Medical History: Reports: Hx Hyperthyroidism - Treated with 

radioactive iodine, Hx Hypothyroidism - Post ablation, takes thyroid replacement

 hormone


Renal/ Medical History: Reports: Hx Renal Insufficiency


Malignancy Medical History: Reports: Hx Colorectal Cancer


GI Medical History: Reports: Hx Gastroesophageal Reflux Disease


Musculoskeletal Medical History: Reports Hx Arthritis


Psychiatric Medical History: Reports: Hx Depression


Past Surgical History: Reports: Hx Ileostomy - With Low anterior resection and 

total colectomy-mesal-rectal, Hx Tubal Ligation, Other - Tumor removal of the 

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





- Immunizations


Hx Diphtheria, Pertussis, Tetanus Vaccination: Yes - not sure





Review of Systems





- Review of Systems


Constitutional: No symptoms reported


EENT: No symptoms reported


Cardiovascular: No symptoms reported, Lightheaded


Respiratory: See HPI, Short of breath


Gastrointestinal: Nausea, Vomiting - Patient reports nausea and vomiting in the 

morning when she takes her medicines.


Genitourinary: No symptoms reported


Female Genitourinary: Post menopausal


Musculoskeletal: No symptoms reported


Skin: No symptoms reported


Hematologic/Lymphatic: No symptoms reported


Neurological/Psychological: Depression, Weakness





Physical Exam





- Vital signs


Vitals: 


                                        











Temp


 


 98.2 F 


 


 06/16/20 13:49














- Notes


Notes: 





Physical Exam:


 


General: Alert, appears chronically ill and older than stated age. 


 


HEENT: Normocephalic. Atraumatic. PERRL. Extraocular movements intact. Bryon

pharynx clear.


 


Neck: Supple. Non-tender.


 


Respiratory: No respiratory distress. Clear and equal breath sounds bilaterally.


 


Cardiovascular: Regular rate and rhythm. 


 


Abdominal: Dark pasty substance in ostomy bag. No distension. Normal Bowel 

Sounds. 


 


Back: No gross abnormalities. 


 


Extremities: Moves all four extremities.


Upper extremities: Normal inspection. Normal ROM.  


Lower extremities: Normal inspection. No edema. Normal ROM.


 


Neurological: Normal cognition. AAOx4. Normal speech.  


 


Psychological: Normal affect. Normal Mood. 


 


Skin: Warm. Dry. Normal color.





Course





- Vital Signs


Vital signs: 


                                        











Temp Pulse Resp BP Pulse Ox


 


 98.2 F      27 H  118/100 H  89 L


 


 06/16/20 13:49     06/16/20 19:06  06/16/20 19:06  06/16/20 19:05














- Laboratory


Result Diagrams: 


                                 06/16/20 16:00





                                 06/16/20 16:00


Laboratory results interpreted by me: 


                                        











  06/16/20 06/16/20 06/16/20





  16:00 16:00 16:00


 


RDW  15.5 H  


 


Lymph % (Auto)  8.5 L  


 


Absolute Neuts (auto)  8.6 H  


 


Seg Neutrophils %  83.4 H  


 


Sodium   134.6 L 


 


Chloride   108 H 


 


Carbon Dioxide   11 L 


 


BUN   61 H 


 


Creatinine   3.47 H 


 


Est GFR ( Amer)   16 L 


 


Est GFR (MDRD) Non-Af   13 L 


 


Alkaline Phosphatase   172 H 


 


Creatine Kinase   206 H 


 


NT-Pro-B Natriuret Pep    844 H


 


Free T3 pg/mL   














  06/16/20





  16:00


 


RDW 


 


Lymph % (Auto) 


 


Absolute Neuts (auto) 


 


Seg Neutrophils % 


 


Sodium 


 


Chloride 


 


Carbon Dioxide 


 


BUN 


 


Creatinine 


 


Est GFR ( Amer) 


 


Est GFR (MDRD) Non-Af 


 


Alkaline Phosphatase 


 


Creatine Kinase 


 


NT-Pro-B Natriuret Pep 


 


Free T3 pg/mL  2.50 L














- Diagnostic Test


Radiology reviewed: Image reviewed, Reports reviewed - Chest x-ray does not show

 acute findings.





- EKG Interpretation by Me


EKG shows normal: Sinus rhythm, Axis, Intervals, ST-T Waves.  abnormal: QRS 

Complexes - Old inferior MI


Rate: Normal - 77


Rhythm: Arrthymia


When compared to previous EKG there are: No significant change





- Consults


  ** Dr. Weiland


Time consulted: 19:18


Consulted provider: will come to ER





Discharge





- Discharge


Clinical Impression: 


 Dehydration





Hypotension


Qualifiers:


 Hypotension type: hypotension due to hypovolemia Qualified Code(s): I95.89 - 

Other hypotension





Acute renal failure (ARF)


Qualifiers:


 Acute renal failure type: unspecified Qualified Code(s): N17.9 - Acute kidney 

failure, unspecified





Condition: Good


Disposition: ADMITTED AS INPATIENT


Admitting Provider: Weiland (Hospitalist)


Unit Admitted: Medical Floor





I personally performed the services described in the documentation, reviewed and

 edited the documentation which was dictated to the scribe in my presence, and 

it accurately records my words and actions.

## 2020-06-16 NOTE — RADIOLOGY REPORT (SQ)
EXAM DESCRIPTION:  CHEST SINGLE VIEW



IMAGES COMPLETED DATE/TIME:  6/16/2020 5:07 pm



REASON FOR STUDY:  Short of breath



COMPARISON:  3/14/2020



EXAM PARAMETERS:  NUMBER OF VIEWS: One view.

TECHNIQUE: Single frontal radiographic view of the chest acquired.

RADIATION DOSE: NA

LIMITATIONS: None.



FINDINGS:  LUNGS AND PLEURA: No opacities, masses or pneumothorax. No pleural effusion.

MEDIASTINUM AND HILAR STRUCTURES: No masses.  Contour normal.

HEART AND VASCULAR STRUCTURES:  Stable appearance.  Normal vasculature.

BONES: No acute findings.

HARDWARE:  Right Vke[place]-F-Eesq catheter, stable finding.

OTHER:  Hiatal hernia, stable finding.



IMPRESSION:  1.  No significant interval change since the prior study dated 3/14/2020.  No acute find
ing.



TECHNICAL DOCUMENTATION:  JOB ID:  [id number]

 2011 [employer]- All Rights Reserved



Reading location - IP/workstation name: [email or web address]

## 2020-06-16 NOTE — PDOC H&P
History of Present Illness


Admission Date/PCP: 


2020  19:18


Eugenia Spaulding MD


Patient complains of: Lightheadedness


History of Present Illness: 


SERGEI BAKER is a 64 year old female who presented to the emergency room 

with a 10-day history of lightheadedness.  She admits a 10-day history of 

progressively worsening lightheadedness and dizziness upon standing or 

participating in any activity.  Her lightheadedness has been accompanied by 

progressively worsening dyspnea on exertion and has been associated with morning

nausea and vomiting which she relates to taking her morning medications.  She 

denies other associated or accompanying signs and symptoms.  She admits numerous

prior similar episodes related to her chronic gastrointestinal blood loss and 

episodes of acute renal failure.  She has not identified any additional 

aggravating or ameliorating factors for her lightheadedness.  In the emergency 

room she was found to have a creatinine of 3.7 and a BUN of 61 and was subsequen

tly admitted to the hospital for further evaluation and treatment.





Past Medical History


Cardiac Medical History: Reports: Coronary Artery Disease, Myocardial Infarction

- , Hypertension


   Denies: Atrial Fibrillation, Congestive Heart Failure, Hyperlipidema, 

Peripheral Vascular Disease, Pulmonary Embolism, Heart Murmur


Pulmonary Medical History: Reports: Asthma, Bronchitis, Chronic Obstructive 

Pulmonary Disease (COPD), Pneumonia - , Respiratory Failure


   Denies: Sleep Apnea, Tuberculosis


EENT Medical History: 


   Denies: Cataracts, Ears - Hearing aids


Neurological Medical History: 


   Denies: Hemorrhagic CVA, Ischemic CVA, Seizures


Endocrine Medical History: Reports: Hyperthyroidism - Treated with radioactive 

iodine, Hypothyroidism - Post ablation, takes thyroid replacement hormone


   Denies: Diabetes Mellitus Type 1, Diabetes Mellitus Type 2


Renal/ Medical History: 


   Denies: Chronic Kidney Disease, Nephrolithiasis


Malignancy Medical History: Reports: Colorectal Cancer


GI Medical History: Reports: Gastroesophageal Reflux Disease, Other - Chronic 

gastrointestinal blood loss of uncertain origin


   Denies: Cirrhosis, Crohn's Disease, Hepatitis, Hiatal Hernia, Ulcerative 

Colitis


Musculoskeltal Medical History: Reports: Arthritis


   Denies: Fibromyalgia


Skin Medical History: 


   Denies: Eczema, Psoriasis


Psychiatric Medical History: Reports: Depression


   Denies: Alcohol Dependency, Bipolar Disorder, Dementia, Post Traumatic Stress

Disorder, Substance Abuse, Tobacco Dependency


Traumatic Medical History: Reports: None


Hematology: Reports: Anemia - Chronic due to chronic blood loss, Bleeding 

Tendencies


Infectious Medical History: Reports: None





Past Surgical History


Past Surgical History: Reports: Ileostomy - With Low anterior resection and 

total colectomy-mesal-rectal, Tubal Ligation, Other - Tumor removal of the 

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


   Denies: Amputation, Appendectomy,  Section, Cholecystectomy, 

Colostomy, Coronary Artery Bypass Graft, Gastric Bypass Surgery, Herniorrhaphy, 

Hysterectomy, Mastectomy, Pacemaker, Tonsillectomy





Social History


Information Source: Patient


Lives with: Family


Smoking Status: Former Smoker


Electronic Cigarette use?: No


Frequency of Alcohol Use: None


Hx Recreational Drug Use: No


Drugs: None


Hx Prescription Drug Abuse: No





- Advance Directive


Resuscitation Status: Full Code


Surrogate healthcare decision maker:: 


Luisana Carranza





Family History


Family History: CAD, DM, Hyperlipidemia, Hypertension


Parental Family History Reviewed: Yes


Children Family History Reviewed: No


Sibling(s) Family History Reviewed.: Yes





Medication/Allergy


Home Medications: 








Oxybutynin Chloride [Ditropan 5 mg Tablet] 5 mg PO TID #0 12 


Pravastatin Sodium [Pravachol] 20 mg PO QHS 16 


Omeprazole 20 mg PO ACBRKFST 18 


Sertraline HCl [Zoloft] 150 mg PO DAILY 19 


Atenolol [Tenormin] 25 mg PO QAM 20 


Diphenoxylate HCl/Atrop Sulf [Lomotil 2.5 mg Tablet] 2 tab PO TID 20 


Hydroxyzine Pamoate [Vistaril 25 mg Capsule] 25 mg PO Q8HP PRN 20 


Megestrol Acetate [Megace Chen 400 mg/10 ml Udcup] 800 mg PO DAILY #60 udc 

20 


Alprazolam 1 mg PO TIDP PRN 20 


Fluticasone/Salmeterol [Advair 250-50 Diskus 14 Dose/Diskus] 1 inh IH Q12 

20 


Levothyroxine Sodium [Synthroid 0.15 mg Tablet] 0.15 mg PO Q6AM 20 


Ramipril [Altace 5 mg Capsule] 5 mg PO DAILY 20 








Allergies/Adverse Reactions: 


                                        





shellfish derived Allergy (Severe, Verified 20 02:17)


   Hives


iodine [Iodine] Allergy (Intermediate, Verified 20 02:17)


   Rash


Sulfa (Sulfonamide Antibiotics) Allergy (Intermediate, Verified 20 02:17)


   Hives


Iodinated Contrast Media Adverse Reaction (Intermediate, Verified 20 

02:17)


   Nausea











Review of Systems


Constitutional: ABSENT: chills, fever(s)


Eyes: ABSENT: visual disturbances, other - Eye pain


Ears: ABSENT: other - Ear pain


Nose, Mouth, and Throat: ABSENT: headache(s), mouth pain, sore throat


Cardiovascular: PRESENT: other - Lightheadedness.  ABSENT: chest pain, 

palpitations


Respiratory: ABSENT: cough, dyspnea


Gastrointestinal: PRESENT: as per HPI, diarrhea - Chronic per ileostomy, nausea,

 vomiting.  ABSENT: abdominal pain, constipation


Genitourinary: PRESENT: other - Decreased urine output.  ABSENT: dysuria, 

hematuria


Musculoskeletal: ABSENT: back pain, joint swelling, muscle weakness


Integumentary: ABSENT: pruritus, rash


Neurological: PRESENT: as per HPI, dizziness.  ABSENT: confusion, convulsions, 

focal weakness, memory loss, syncope


Psychiatric: ABSENT: anxiety, depression


Endocrine: ABSENT: cold intolerance, heat intolerance, polydipsia, polyphagia, 

polyuria


Hematologic/Lymphatic: ABSENT: easy bleeding, easy bruising


Allergic/Immunologic: ABSENT: seasonal rhinorrhea





Physical Exam


Vital Signs: 


                                        











Temp Pulse Resp BP Pulse Ox


 


 98.2 F      27 H  118/100 H  89 L


 


 20 13:49     20 19:06  20 19:06  20 19:05








                                 Intake & Output











 06/14/20 06/15/20 06/16/20





 23:59 23:59 23:59


 


Intake Total   1000


 


Balance   1000


 


Weight   66.678 kg











General appearance: PRESENT: no acute distress, cooperative


Head exam: PRESENT: atraumatic, normocephalic


Eye exam: PRESENT: conjunctiva pink.  ABSENT: conjunctival injection, scleral 

icterus


Ear exam: PRESENT: normal external ear exam.  ABSENT: bleeding, drainage


Mouth exam: PRESENT: dry mucosa, neck supple


Neck exam: ABSENT: thyromegaly, tracheal deviation


Respiratory exam: PRESENT: clear to auscultation susan, symmetrical, unlabored


Cardiovascular exam: PRESENT: RRR.  ABSENT: clicks, gallop, rubs


Pulses: PRESENT: normal radial pulses, normal dorsalis pedis pul


Vascular exam: PRESENT: normal capillary refill.  ABSENT: pallor


GI/Abdominal exam: PRESENT: normal bowel sounds, soft, other - Ileostomy is 

noted.  ABSENT: tenderness


Rectal exam: PRESENT: deferred


Extremities exam: ABSENT: joint swelling, pedal edema


Musculoskeletal exam: ABSENT: deformity, dislocation


Neurological exam: PRESENT: alert, oriented to person, oriented to place, 

oriented to time, oriented to situation, CN II-XII grossly intact.  ABSENT: 

motor sensory deficit


Psychiatric exam: PRESENT: appropriate affect, normal mood


Skin exam: PRESENT: dry, intact, warm.  ABSENT: jaundice, rash, urticaria





Results


Laboratory Results: 


                                        





                                 20 16:00 





                                 20 16:00 





                                        











  20





  16:00 16:00 16:00


 


WBC  10.3  


 


RBC  4.23  


 


Hgb  13.2  


 


Hct  39.6  


 


MCV  94  


 


MCH  31.3  


 


MCHC  33.5  


 


RDW  15.5 H  


 


Plt Count  385  


 


Seg Neutrophils %  83.4 H  


 


Sodium   134.6 L 


 


Potassium   4.6 


 


Chloride   108 H 


 


Carbon Dioxide   11 L 


 


Anion Gap   16 


 


BUN   61 H 


 


Creatinine   3.47 H 


 


Est GFR ( Amer)   16 L 


 


Glucose   87 


 


Calcium   10.2 


 


Total Bilirubin   0.3 


 


AST   23 


 


Alkaline Phosphatase   172 H 


 


Total Protein   8.2 


 


Albumin   4.7 


 


Free T4   


 


Free T3 pg/mL   


 


Blood Type    B POSITIVE


 


Antibody Screen    NEGATIVE














  20





  16:00


 


WBC 


 


RBC 


 


Hgb 


 


Hct 


 


MCV 


 


MCH 


 


MCHC 


 


RDW 


 


Plt Count 


 


Seg Neutrophils % 


 


Sodium 


 


Potassium 


 


Chloride 


 


Carbon Dioxide 


 


Anion Gap 


 


BUN 


 


Creatinine 


 


Est GFR (African Amer) 


 


Glucose 


 


Calcium 


 


Total Bilirubin 


 


AST 


 


Alkaline Phosphatase 


 


Total Protein 


 


Albumin 


 


Free T4  0.96


 


Free T3 pg/mL  2.50 L


 


Blood Type 


 


Antibody Screen 








                                        











  20





  16:00 16:00


 


Creatine Kinase  206 H 


 


Troponin I   0.095


 


NT-Pro-B Natriuret Pep   844 H











Impressions: 


                                        





Chest X-Ray  20 15:42


IMPRESSION:  1.  No significant interval change since the prior study dated 

3/14/2020.  No acute finding.


 














Assessment and Plan





- Diagnosis


(1) Acute kidney injury


Is this a current diagnosis for this admission?: Yes   





(2) Acidosis


Is this a current diagnosis for this admission?: Yes   





(3) Anemia due to blood loss, chronic


Is this a current diagnosis for this admission?: Yes   





(4) Hypothyroidism


Qualifiers: 


   Hypothyroidism type: postablative   Qualified Code(s): E89.0 - Postprocedural

 hypothyroidism   


Is this a current diagnosis for this admission?: Yes   





(5) Hyperlipidemia


Qualifiers: 


   Hyperlipidemia type: unspecified   Qualified Code(s): E78.5 - Hyperlipidemia,

 unspecified   


Is this a current diagnosis for this admission?: Yes   





(6) COPD (chronic obstructive pulmonary disease)


Qualifiers: 


   COPD type: unspecified COPD   Qualified Code(s): J44.9 - Chronic obstructive 

pulmonary disease, unspecified   


Is this a current diagnosis for this admission?: Yes   





- Plan Summary


Summary: 


Patient will be admitted to the medical floor where she will receive routine 

supportive and symptomatic cares.  She will be given IV fluids and increased 

volume and her renal functions electrolytes will be monitored closely.  Her 

hemoglobin will be monitored closely and she will be transfused as required if 

she remains symptomatic after appropriate hydration.  Orthostatic vital signs 

will be monitored.  She will use Ativan 1 mg IV every 4 hours as needed for an

xiety or restlessness.  She will use morphine sulfate 2 to 4 mg IV every 2 hours

 as needed for pain.  CBCs, metabolic profiles, magnesium levels and other 

laboratory or radiographic evaluations will be obtained as appropriate.  Her 

usual medications will be resumed, as appropriate, when her medication list has 

been verified and reconciled.  She will be treated with a cardiac and prerenal 

restricted diet.





- Time


Time Spent with patient: Less than 15 minutes


Medications reviewed and adjusted accordingly: Yes


Anticipated discharge: Home





- Inpatient Certification


Based on my medical assessment, after consideration of the patient's 

comorbidities, presenting symptoms, or acuity I expect that the services needed 

warrant INPATIENT care.: Yes


I certify that my determination is in accordance with my understanding of 

Medicare's requirements for reasonable and necessary INPATIENT services [42 CFR 

412.3e].: Yes


Medical Necessity: Need Close Monitoring Due to Risk of Patient Decompensation, 

Need For IV Fluids, Risk of Complication if Not Cared For in Hospital

## 2020-06-17 LAB
ANION GAP SERPL CALC-SCNC: 10 MMOL/L (ref 5–19)
APPEARANCE UR: (no result)
APTT PPP: YELLOW S
BILIRUB UR QL STRIP: NEGATIVE
BUN SERPL-MCNC: 52 MG/DL (ref 7–20)
CALCIUM: 8.4 MG/DL (ref 8.4–10.2)
CHLORIDE SERPL-SCNC: 113 MMOL/L (ref 98–107)
CO2 SERPL-SCNC: 12 MMOL/L (ref 22–30)
ERYTHROCYTE [DISTWIDTH] IN BLOOD BY AUTOMATED COUNT: 15.7 % (ref 11.5–14)
GLUCOSE SERPL-MCNC: 129 MG/DL (ref 75–110)
GLUCOSE UR STRIP-MCNC: NEGATIVE MG/DL
HCT VFR BLD CALC: 29.2 % (ref 36–47)
HGB BLD-MCNC: 9.9 G/DL (ref 12–15.5)
KETONES UR STRIP-MCNC: NEGATIVE MG/DL
MCH RBC QN AUTO: 32 PG (ref 27–33.4)
MCHC RBC AUTO-ENTMCNC: 33.8 G/DL (ref 32–36)
MCV RBC AUTO: 95 FL (ref 80–97)
NITRITE UR QL STRIP: NEGATIVE
PH UR STRIP: 6 [PH] (ref 5–9)
PLATELET # BLD: 256 10^3/UL (ref 150–450)
POTASSIUM SERPL-SCNC: 3.7 MMOL/L (ref 3.6–5)
PROT UR STRIP-MCNC: 100 MG/DL
RBC # BLD AUTO: 3.09 10^6/UL (ref 3.72–5.28)
SP GR UR STRIP: 1.01
UROBILINOGEN UR-MCNC: NEGATIVE MG/DL (ref ?–2)
WBC # BLD AUTO: 6.1 10^3/UL (ref 4–10.5)

## 2020-06-17 RX ADMIN — Medication SCH MG: at 18:44

## 2020-06-17 RX ADMIN — MAGNESIUM SULFATE IN DEXTROSE SCH MLS/HR: 10 INJECTION, SOLUTION INTRAVENOUS at 19:47

## 2020-06-17 RX ADMIN — BUDESONIDE SCH MG: 0.5 SUSPENSION RESPIRATORY (INHALATION) at 08:43

## 2020-06-17 RX ADMIN — LEVALBUTEROL HYDROCHLORIDE SCH MG: 1.25 SOLUTION RESPIRATORY (INHALATION) at 15:46

## 2020-06-17 RX ADMIN — PANTOPRAZOLE SODIUM SCH MG: 40 TABLET, DELAYED RELEASE ORAL at 05:51

## 2020-06-17 RX ADMIN — HEPARIN SODIUM SCH: 5000 INJECTION, SOLUTION INTRAVENOUS; SUBCUTANEOUS at 14:05

## 2020-06-17 RX ADMIN — HEPARIN SODIUM SCH UNIT: 5000 INJECTION, SOLUTION INTRAVENOUS; SUBCUTANEOUS at 21:16

## 2020-06-17 RX ADMIN — MAGNESIUM SULFATE IN DEXTROSE SCH MLS/HR: 10 INJECTION, SOLUTION INTRAVENOUS at 07:28

## 2020-06-17 RX ADMIN — Medication SCH: at 21:16

## 2020-06-17 RX ADMIN — MAGNESIUM SULFATE IN DEXTROSE SCH MLS/HR: 10 INJECTION, SOLUTION INTRAVENOUS at 18:44

## 2020-06-17 RX ADMIN — LEVALBUTEROL HYDROCHLORIDE SCH MG: 1.25 SOLUTION RESPIRATORY (INHALATION) at 08:43

## 2020-06-17 RX ADMIN — Medication SCH: at 05:51

## 2020-06-17 RX ADMIN — BUDESONIDE SCH: 0.5 SUSPENSION RESPIRATORY (INHALATION) at 20:12

## 2020-06-17 RX ADMIN — MAGNESIUM SULFATE IN DEXTROSE SCH MLS/HR: 10 INJECTION, SOLUTION INTRAVENOUS at 08:51

## 2020-06-17 RX ADMIN — SODIUM CHLORIDE, SODIUM LACTATE, POTASSIUM CHLORIDE, AND CALCIUM CHLORIDE PRN MLS/HR: .6; .31; .03; .02 INJECTION, SOLUTION INTRAVENOUS at 03:13

## 2020-06-17 RX ADMIN — Medication SCH: at 14:05

## 2020-06-17 RX ADMIN — HEPARIN SODIUM SCH UNIT: 5000 INJECTION, SOLUTION INTRAVENOUS; SUBCUTANEOUS at 05:50

## 2020-06-17 NOTE — PDOC PROGRESS REPORT
Subjective


Progress Note for:: 06/17/20


Subjective:: 





Patient admits to not eating enough.


Reason For Visit: 


ACUTE KIDNEY INJURY








Physical Exam


Vital Signs: 


                                        











Temp Pulse Resp BP Pulse Ox


 


 97.6 F   64   16   97/57 L  100 


 


 06/17/20 12:00  06/17/20 12:00  06/17/20 12:00  06/17/20 12:00  06/17/20 12:00








                                 Intake & Output











 06/16/20 06/17/20 06/18/20





 06:59 06:59 06:59


 


Intake Total  3704 370


 


Output Total  400 


 


Balance  3304 370


 


Weight  66.5 kg 











General appearance: PRESENT: no acute distress, cooperative


Neck exam: ABSENT: JVD


Respiratory exam: PRESENT: clear to auscultation susan, unlabored.  ABSENT: 

tachypnea, wheezes


Cardiovascular exam: PRESENT: RRR, +S1, +S2.  ABSENT: tachycardia


GI/Abdominal exam: PRESENT: soft.  ABSENT: rebound, rigid, tenderness


Neurological exam: PRESENT: alert, awake, oriented to person, oriented to place,

oriented to time





Results


Laboratory Results: 


                                        





                                 06/17/20 04:50 





                                 06/17/20 04:50 





                                        











  06/16/20 06/16/20 06/16/20





  16:00 16:00 16:00


 


WBC  10.3  


 


RBC  4.23  


 


Hgb  13.2  


 


Hct  39.6  


 


MCV  94  


 


MCH  31.3  


 


MCHC  33.5  


 


RDW  15.5 H  


 


Plt Count  385  


 


Seg Neutrophils %  83.4 H  


 


Sodium   134.6 L 


 


Potassium   4.6 


 


Chloride   108 H 


 


Carbon Dioxide   11 L 


 


Anion Gap   16 


 


BUN   61 H 


 


Creatinine   3.47 H 


 


Est GFR ( Amer)   16 L 


 


Glucose   87 


 


Calcium   10.2 


 


Magnesium   


 


Total Bilirubin   0.3 


 


AST   23 


 


Alkaline Phosphatase   172 H 


 


Total Protein   8.2 


 


Albumin   4.7 


 


TSH   


 


Free T4   


 


Free T3 pg/mL   


 


Urine Color   


 


Urine Appearance   


 


Urine pH   


 


Ur Specific Gravity   


 


Urine Protein   


 


Urine Glucose (UA)   


 


Urine Ketones   


 


Urine Blood   


 


Urine Nitrite   


 


Ur Leukocyte Esterase   


 


Urine WBC (Auto)   


 


Urine RBC (Auto)   


 


Blood Type    B POSITIVE


 


Antibody Screen    NEGATIVE














  06/16/20 06/17/20 06/17/20





  16:00 02:30 04:50


 


WBC    6.1


 


RBC    3.09 L


 


Hgb    9.9 L D


 


Hct    29.2 L


 


MCV    95


 


MCH    32.0


 


MCHC    33.8


 


RDW    15.7 H


 


Plt Count    256


 


Seg Neutrophils %   


 


Sodium   


 


Potassium   


 


Chloride   


 


Carbon Dioxide   


 


Anion Gap   


 


BUN   


 


Creatinine   


 


Est GFR (African Amer)   


 


Glucose   


 


Calcium   


 


Magnesium   


 


Total Bilirubin   


 


AST   


 


Alkaline Phosphatase   


 


Total Protein   


 


Albumin   


 


TSH   


 


Free T4  0.96  


 


Free T3 pg/mL  2.50 L  


 


Urine Color   YELLOW 


 


Urine Appearance   TURBID 


 


Urine pH   6.0 


 


Ur Specific Gravity   1.014 


 


Urine Protein   100 H 


 


Urine Glucose (UA)   NEGATIVE 


 


Urine Ketones   NEGATIVE 


 


Urine Blood   MODERATE H 


 


Urine Nitrite   NEGATIVE 


 


Ur Leukocyte Esterase   LARGE H 


 


Urine WBC (Auto)   >182 


 


Urine RBC (Auto)   39 


 


Blood Type   


 


Antibody Screen   














  06/17/20 06/17/20





  04:50 04:50


 


WBC  


 


RBC  


 


Hgb  


 


Hct  


 


MCV  


 


MCH  


 


MCHC  


 


RDW  


 


Plt Count  


 


Seg Neutrophils %  


 


Sodium  135.1 L 


 


Potassium  3.7 


 


Chloride  113 H 


 


Carbon Dioxide  12 L 


 


Anion Gap  10 


 


BUN  52 H 


 


Creatinine  2.48 H 


 


Est GFR (African Amer)  24 L 


 


Glucose  129 H 


 


Calcium  8.4 


 


Magnesium  0.9 L* 


 


Total Bilirubin  


 


AST  


 


Alkaline Phosphatase  


 


Total Protein  


 


Albumin  


 


TSH   51.30 H


 


Free T4  


 


Free T3 pg/mL  


 


Urine Color  


 


Urine Appearance  


 


Urine pH  


 


Ur Specific Gravity  


 


Urine Protein  


 


Urine Glucose (UA)  


 


Urine Ketones  


 


Urine Blood  


 


Urine Nitrite  


 


Ur Leukocyte Esterase  


 


Urine WBC (Auto)  


 


Urine RBC (Auto)  


 


Blood Type  


 


Antibody Screen  








                                        











  06/16/20 06/16/20





  16:00 16:00


 


Creatine Kinase  206 H 


 


Troponin I   0.095


 


NT-Pro-B Natriuret Pep   844 H











Impressions: 


                                        





Chest X-Ray  06/16/20 15:42


IMPRESSION:  1.  No significant interval change since the prior study dated 

3/14/2020.  No acute finding.


 














Assessment and Plan





- Diagnosis


(1) Acute kidney injury


Is this a current diagnosis for this admission?: Yes   


Plan: 


I suspect this is due to prerenal azotemia due severe dehydration just like 

before.  We will continue with aggressive IV fluids administration and closely 

monitor I's and O's.  Repeat BMP to monitor renal function.








(2) Metabolic acidosis


Is this a current diagnosis for this admission?: Yes   


Plan: 


Secondary to renal failure.  Anion gap is normal this morning.  Continue 

lactated Ringer's.  I will give some sodium bicarb p.o.








(3) Hypomagnesemia


Is this a current diagnosis for this admission?: Yes   


Plan: 


Suspect this may be secondary to GI losses.  Replete magnesium aggressively IV 4

g.  Repeat magnesium in the morning.








(4) Hypothyroidism


Qualifiers: 


   Hypothyroidism type: postablative   Qualified Code(s): E89.0 - Postprocedural

hypothyroidism   


Is this a current diagnosis for this admission?: Yes   


Plan: 


Patient's TSH is 51.3 indicating poor control of her hypothyroidism.  I had 

increased Synthroid few months ago and patient states that she has been 

compliant with it.  I will increase her Synthroid further 0.2 mg.








(5) COPD (chronic obstructive pulmonary disease)


Qualifiers: 


   COPD type: unspecified COPD   Qualified Code(s): J44.9 - Chronic obstructive 

pulmonary disease, unspecified   


Is this a current diagnosis for this admission?: Yes   


Plan: 


Not acutely exacerbated.  Nebs as needed.

## 2020-06-18 LAB
ANION GAP SERPL CALC-SCNC: 7 MMOL/L (ref 5–19)
BUN SERPL-MCNC: 37 MG/DL (ref 7–20)
CALCIUM: 8.3 MG/DL (ref 8.4–10.2)
CHLORIDE SERPL-SCNC: 114 MMOL/L (ref 98–107)
CO2 SERPL-SCNC: 15 MMOL/L (ref 22–30)
ERYTHROCYTE [DISTWIDTH] IN BLOOD BY AUTOMATED COUNT: 15.5 % (ref 11.5–14)
GLUCOSE SERPL-MCNC: 108 MG/DL (ref 75–110)
HCT VFR BLD CALC: 26.7 % (ref 36–47)
HGB BLD-MCNC: 9 G/DL (ref 12–15.5)
MCH RBC QN AUTO: 31.9 PG (ref 27–33.4)
MCHC RBC AUTO-ENTMCNC: 33.7 G/DL (ref 32–36)
MCV RBC AUTO: 95 FL (ref 80–97)
PLATELET # BLD: 253 10^3/UL (ref 150–450)
POTASSIUM SERPL-SCNC: 3.2 MMOL/L (ref 3.6–5)
RBC # BLD AUTO: 2.82 10^6/UL (ref 3.72–5.28)
WBC # BLD AUTO: 6.1 10^3/UL (ref 4–10.5)

## 2020-06-18 RX ADMIN — HEPARIN SODIUM SCH: 5000 INJECTION, SOLUTION INTRAVENOUS; SUBCUTANEOUS at 14:05

## 2020-06-18 RX ADMIN — BUDESONIDE SCH MG: 0.5 SUSPENSION RESPIRATORY (INHALATION) at 08:52

## 2020-06-18 RX ADMIN — Medication SCH: at 14:06

## 2020-06-18 RX ADMIN — ACETAMINOPHEN PRN MG: 325 TABLET ORAL at 05:12

## 2020-06-18 RX ADMIN — ACETAMINOPHEN PRN MG: 325 TABLET ORAL at 09:41

## 2020-06-18 RX ADMIN — PANTOPRAZOLE SODIUM SCH MG: 40 TABLET, DELAYED RELEASE ORAL at 05:12

## 2020-06-18 RX ADMIN — HEPARIN SODIUM SCH UNIT: 5000 INJECTION, SOLUTION INTRAVENOUS; SUBCUTANEOUS at 05:12

## 2020-06-18 RX ADMIN — LEVALBUTEROL HYDROCHLORIDE SCH MG: 1.25 SOLUTION RESPIRATORY (INHALATION) at 00:05

## 2020-06-18 RX ADMIN — LEVOTHYROXINE SODIUM SCH MG: 100 TABLET ORAL at 05:12

## 2020-06-18 RX ADMIN — SODIUM CHLORIDE, SODIUM LACTATE, POTASSIUM CHLORIDE, AND CALCIUM CHLORIDE PRN MLS/HR: .6; .31; .03; .02 INJECTION, SOLUTION INTRAVENOUS at 23:24

## 2020-06-18 RX ADMIN — Medication SCH: at 21:00

## 2020-06-18 RX ADMIN — ACETAMINOPHEN PRN MG: 325 TABLET ORAL at 01:02

## 2020-06-18 RX ADMIN — LEVALBUTEROL HYDROCHLORIDE SCH MG: 1.25 SOLUTION RESPIRATORY (INHALATION) at 08:52

## 2020-06-18 RX ADMIN — Medication SCH MG: at 17:59

## 2020-06-18 RX ADMIN — SODIUM CHLORIDE, SODIUM LACTATE, POTASSIUM CHLORIDE, AND CALCIUM CHLORIDE PRN MLS/HR: .6; .31; .03; .02 INJECTION, SOLUTION INTRAVENOUS at 03:31

## 2020-06-18 RX ADMIN — SODIUM CHLORIDE, SODIUM LACTATE, POTASSIUM CHLORIDE, AND CALCIUM CHLORIDE PRN MLS/HR: .6; .31; .03; .02 INJECTION, SOLUTION INTRAVENOUS at 14:09

## 2020-06-18 RX ADMIN — Medication SCH: at 05:12

## 2020-06-18 RX ADMIN — HEPARIN SODIUM SCH UNIT: 5000 INJECTION, SOLUTION INTRAVENOUS; SUBCUTANEOUS at 21:00

## 2020-06-18 RX ADMIN — Medication SCH MG: at 05:12

## 2020-06-18 NOTE — PDOC PROGRESS REPORT
Subjective


Progress Note for:: 06/18/20


Subjective:: 


Patient has no complaints.  She is eating and drinking.





Reason For Visit: 


ACUTE KIDNEY INJURY








Physical Exam


Vital Signs: 


                                        











Temp Pulse Resp BP Pulse Ox


 


 97.9 F   60   16   90/56 L  99 


 


 06/18/20 15:34  06/18/20 15:34  06/18/20 15:34  06/18/20 15:34  06/18/20 15:34








                                 Intake & Output











 06/17/20 06/18/20 06/19/20





 06:59 06:59 06:59


 


Intake Total 3704 3190 480


 


Output Total 400 1300 


 


Balance 3304 1890 480


 


Weight 66.5 kg 74.2 kg 











General appearance: PRESENT: no acute distress, cooperative


Neck exam: ABSENT: JVD


Respiratory exam: PRESENT: clear to auscultation susan, unlabored.  ABSENT: 

tachypnea, wheezes


Cardiovascular exam: PRESENT: RRR, +S1, +S2.  ABSENT: tachycardia


GI/Abdominal exam: PRESENT: soft.  ABSENT: rebound, rigid, tenderness


Neurological exam: PRESENT: alert, awake, oriented to person, oriented to place,

oriented to time





Results


Laboratory Results: 


                                        





                                 06/18/20 04:50 





                                 06/18/20 04:50 





                                        











  06/18/20 06/18/20





  04:50 04:50


 


WBC  6.1 


 


RBC  2.82 L 


 


Hgb  9.0 L 


 


Hct  26.7 L 


 


MCV  95 


 


MCH  31.9 


 


MCHC  33.7 


 


RDW  15.5 H 


 


Plt Count  253 


 


Sodium   135.6 L


 


Potassium   3.2 L


 


Chloride   114 H


 


Carbon Dioxide   15 L


 


Anion Gap   7


 


BUN   37 H


 


Creatinine   2.07 H


 


Est GFR (African Amer)   29 L


 


Glucose   108


 


Calcium   8.3 L


 


Magnesium   2.4 H D








                                        











  06/16/20 06/16/20





  16:00 16:00


 


Creatine Kinase  206 H 


 


Troponin I   0.095


 


NT-Pro-B Natriuret Pep   844 H











Impressions: 


                                        





Chest X-Ray  06/16/20 15:42


IMPRESSION:  1.  No significant interval change since the prior study dated 

3/14/2020.  No acute finding.


 














Assessment and Plan





- Diagnosis


(1) Acute kidney injury


Is this a current diagnosis for this admission?: Yes   


Plan: 


I suspect this is due to prerenal azotemia due severe dehydration just like 

before.  We will continue with aggressive IV fluids administration and closely 

monitor I's and O's.  Repeat BMP to monitor renal function.





6/18/2020-patient's creatinine seems to have not shown much improvement between 

yesterday and today.  I will give another 1 L normal saline bolus the patient 

today.  Sodium bicarbonate also added.  We will continue to monitor urine output

closely and continue to monitor renal function via labs.








(2) Metabolic acidosis


Is this a current diagnosis for this admission?: Yes   


Plan: 


Secondary to renal failure. Continue lactated Ringer's.  Continue sodium bicarb 

p.o.








(3) Hypomagnesemia


Is this a current diagnosis for this admission?: Yes   


Plan: 


Suspect this may be secondary to GI losses.  Repleted aggressively yesterday and

actually hypomagnesemic today.  We will continue to monitor Magnesium levels.








(4) Hypothyroidism


Qualifiers: 


   Hypothyroidism type: postablative   Qualified Code(s): E89.0 - Postprocedural

hypothyroidism   


Is this a current diagnosis for this admission?: Yes   


Plan: 


Patient's TSH is 51.3 indicating poor control of her hypothyroidism.  I had 

increased Synthroid few months ago and patient states that she has been 

compliant with it.  I increased her Synthroid further 0.2 mg.








(5) COPD (chronic obstructive pulmonary disease)


Qualifiers: 


   COPD type: unspecified COPD   Qualified Code(s): J44.9 - Chronic obstructive 

pulmonary disease, unspecified   


Is this a current diagnosis for this admission?: Yes   





- Time


Time Spent with patient: Less than 15 minutes

## 2020-06-19 LAB
ANION GAP SERPL CALC-SCNC: 4 MMOL/L (ref 5–19)
BUN SERPL-MCNC: 25 MG/DL (ref 7–20)
CALCIUM: 8.4 MG/DL (ref 8.4–10.2)
CHLORIDE SERPL-SCNC: 115 MMOL/L (ref 98–107)
CO2 SERPL-SCNC: 18 MMOL/L (ref 22–30)
ERYTHROCYTE [DISTWIDTH] IN BLOOD BY AUTOMATED COUNT: 16.3 % (ref 11.5–14)
GLUCOSE SERPL-MCNC: 71 MG/DL (ref 75–110)
HCT VFR BLD CALC: 25.6 % (ref 36–47)
HGB BLD-MCNC: 8.6 G/DL (ref 12–15.5)
MCH RBC QN AUTO: 31.9 PG (ref 27–33.4)
MCHC RBC AUTO-ENTMCNC: 33.7 G/DL (ref 32–36)
MCV RBC AUTO: 95 FL (ref 80–97)
PLATELET # BLD: 248 10^3/UL (ref 150–450)
POTASSIUM SERPL-SCNC: 3.4 MMOL/L (ref 3.6–5)
RBC # BLD AUTO: 2.71 10^6/UL (ref 3.72–5.28)
WBC # BLD AUTO: 5.2 10^3/UL (ref 4–10.5)

## 2020-06-19 RX ADMIN — Medication SCH: at 22:26

## 2020-06-19 RX ADMIN — Medication SCH MG: at 17:50

## 2020-06-19 RX ADMIN — PANTOPRAZOLE SODIUM SCH MG: 40 TABLET, DELAYED RELEASE ORAL at 05:04

## 2020-06-19 RX ADMIN — PROMETHAZINE HYDROCHLORIDE PRN MG: 25 INJECTION INTRAMUSCULAR; INTRAVENOUS at 00:17

## 2020-06-19 RX ADMIN — LEVOTHYROXINE SODIUM SCH MG: 100 TABLET ORAL at 05:04

## 2020-06-19 RX ADMIN — Medication SCH: at 15:24

## 2020-06-19 RX ADMIN — POTASSIUM CHLORIDE SCH MLS/HR: 29.8 INJECTION, SOLUTION INTRAVENOUS at 12:58

## 2020-06-19 RX ADMIN — HEPARIN SODIUM SCH UNIT: 5000 INJECTION, SOLUTION INTRAVENOUS; SUBCUTANEOUS at 15:18

## 2020-06-19 RX ADMIN — Medication SCH MG: at 05:04

## 2020-06-19 RX ADMIN — HEPARIN SODIUM SCH UNIT: 5000 INJECTION, SOLUTION INTRAVENOUS; SUBCUTANEOUS at 22:31

## 2020-06-19 RX ADMIN — POTASSIUM CHLORIDE SCH MLS/HR: 29.8 INJECTION, SOLUTION INTRAVENOUS at 10:55

## 2020-06-19 RX ADMIN — ACETAMINOPHEN PRN MG: 325 TABLET ORAL at 15:21

## 2020-06-19 RX ADMIN — Medication SCH: at 05:04

## 2020-06-19 RX ADMIN — HEPARIN SODIUM SCH UNIT: 5000 INJECTION, SOLUTION INTRAVENOUS; SUBCUTANEOUS at 05:03

## 2020-06-19 NOTE — PDOC PROGRESS REPORT
Subjective


Progress Note for:: 06/19/20


Subjective:: 





Patient complained today about her ostomy bag leaking and bursting when she 

walks.  Requesting to know if he can give an ostomy bag that is similar to the 

one she has at home.  This is the major source of frustration.  Otherwise she 

feels well in terms of her breathing.  Denies any nausea or vomiting and eating 

adequately.  We will address this issue of the ostomy bag.  Ambulation 

encouraged.


Reason For Visit: 


ACUTE KIDNEY INJURY








Physical Exam


Vital Signs: 


                                        











Temp Pulse Resp BP Pulse Ox


 


 98.2 F   83   16   116/69   91 L


 


 06/19/20 07:21  06/19/20 12:19  06/19/20 12:19  06/19/20 07:21  06/19/20 12:19








                                 Intake & Output











 06/18/20 06/19/20 06/20/20





 06:59 06:59 06:59


 


Intake Total 3190 3680 150


 


Output Total 1300 1251 


 


Balance 1890 2429 150


 


Weight 74.2 kg 74.5 kg 











General appearance: PRESENT: no acute distress, cooperative


Neck exam: ABSENT: JVD


Respiratory exam: PRESENT: unlabored.  ABSENT: accessory muscle use, retraction,

tachypnea


GI/Abdominal exam: PRESENT: soft.  ABSENT: rebound, rigid, tenderness


Neurological exam: PRESENT: alert, awake, oriented to person, oriented to place,

oriented to time





Results


Laboratory Results: 


                                        





                                 06/19/20 04:50 





                                 06/19/20 04:50 





                                        











  06/19/20 06/19/20





  04:50 04:50


 


WBC  5.2 


 


RBC  2.71 L 


 


Hgb  8.6 L 


 


Hct  25.6 L 


 


MCV  95 


 


MCH  31.9 


 


MCHC  33.7 


 


RDW  16.3 H 


 


Plt Count  248 


 


Sodium   136.6 L


 


Potassium   3.4 L


 


Chloride   115 H


 


Carbon Dioxide   18 L


 


Anion Gap   4 L


 


BUN   25 H


 


Creatinine   1.67 H


 


Est GFR (African Amer)   37 L


 


Glucose   71 L


 


Calcium   8.4


 


Magnesium   1.7








                                        











  06/16/20 06/16/20





  16:00 16:00


 


Creatine Kinase  206 H 


 


Troponin I   0.095


 


NT-Pro-B Natriuret Pep   844 H











Impressions: 


                                        





Chest X-Ray  06/16/20 15:42


IMPRESSION:  1.  No significant interval change since the prior study dated 

3/14/2020.  No acute finding.


 














Assessment and Plan





- Diagnosis


(1) Acute kidney injury


Is this a current diagnosis for this admission?: Yes   


Plan: 


I suspect this is due to prerenal azotemia due severe dehydration just like 

before.  We will continue with aggressive IV fluids administration and closely 

monitor I's and O's.  Repeat BMP to monitor renal function.





6/18/2020-patient's creatinine seems to have not shown much improvement between 

yesterday and today.  I will give another 1 L normal saline bolus the patient 

today.  Sodium bicarbonate also added.  We will continue to monitor urine output

closely and continue to monitor renal function via labs.





6/19/2020-patient's creatinine seems to be improving with IV fluid hydration.  I

will give another bolus today while running on lactated Ringer's continuous 

infusion.  Acidosis is also improved with bicarbonate supplementation.  Check 

BMP in the morning.








(2) Metabolic acidosis


Is this a current diagnosis for this admission?: Yes   


Plan: 


Secondary to renal failure. Continue lactated Ringer's.  Continue sodium bicarb 

p.o.








(3) Hypomagnesemia


Is this a current diagnosis for this admission?: Yes   


Plan: 


Suspect this may be secondary to GI losses.  Resolved..








(4) Hypothyroidism


Qualifiers: 


   Hypothyroidism type: postablative   Qualified Code(s): E89.0 - Postprocedural

hypothyroidism   


Is this a current diagnosis for this admission?: Yes   


Plan: 


Patient's TSH is 51.3 indicating poor control of her hypothyroidism.  I had 

increased Synthroid few months ago and patient states that she has been 

compliant with it.  I increased her Synthroid further 0.2 mg.








(5) COPD (chronic obstructive pulmonary disease)


Qualifiers: 


   COPD type: unspecified COPD   Qualified Code(s): J44.9 - Chronic obstructive 

pulmonary disease, unspecified   


Is this a current diagnosis for this admission?: Yes   





- Time


Time Spent with patient: Less than 15 minutes

## 2020-06-20 VITALS — SYSTOLIC BLOOD PRESSURE: 158 MMHG | DIASTOLIC BLOOD PRESSURE: 80 MMHG

## 2020-06-20 LAB
ANION GAP SERPL CALC-SCNC: 6 MMOL/L (ref 5–19)
BUN SERPL-MCNC: 16 MG/DL (ref 7–20)
CALCIUM: 8.9 MG/DL (ref 8.4–10.2)
CHLORIDE SERPL-SCNC: 114 MMOL/L (ref 98–107)
CO2 SERPL-SCNC: 19 MMOL/L (ref 22–30)
ERYTHROCYTE [DISTWIDTH] IN BLOOD BY AUTOMATED COUNT: 16.4 % (ref 11.5–14)
GLUCOSE SERPL-MCNC: 111 MG/DL (ref 75–110)
HCT VFR BLD CALC: 29.8 % (ref 36–47)
HGB BLD-MCNC: 10 G/DL (ref 12–15.5)
MCH RBC QN AUTO: 31.7 PG (ref 27–33.4)
MCHC RBC AUTO-ENTMCNC: 33.4 G/DL (ref 32–36)
MCV RBC AUTO: 95 FL (ref 80–97)
PLATELET # BLD: 253 10^3/UL (ref 150–450)
POTASSIUM SERPL-SCNC: 3.4 MMOL/L (ref 3.6–5)
RBC # BLD AUTO: 3.14 10^6/UL (ref 3.72–5.28)
WBC # BLD AUTO: 5.9 10^3/UL (ref 4–10.5)

## 2020-06-20 RX ADMIN — ACETAMINOPHEN PRN MG: 325 TABLET ORAL at 06:37

## 2020-06-20 RX ADMIN — PROMETHAZINE HYDROCHLORIDE PRN MG: 25 INJECTION INTRAMUSCULAR; INTRAVENOUS at 09:39

## 2020-06-20 RX ADMIN — HEPARIN SODIUM SCH UNIT: 5000 INJECTION, SOLUTION INTRAVENOUS; SUBCUTANEOUS at 05:45

## 2020-06-20 RX ADMIN — Medication SCH MG: at 05:45

## 2020-06-20 RX ADMIN — SODIUM CHLORIDE, SODIUM LACTATE, POTASSIUM CHLORIDE, AND CALCIUM CHLORIDE PRN MLS/HR: .6; .31; .03; .02 INJECTION, SOLUTION INTRAVENOUS at 00:35

## 2020-06-20 RX ADMIN — LEVOTHYROXINE SODIUM SCH MG: 100 TABLET ORAL at 05:47

## 2020-06-20 RX ADMIN — PANTOPRAZOLE SODIUM SCH MG: 40 TABLET, DELAYED RELEASE ORAL at 05:45

## 2020-06-20 RX ADMIN — Medication SCH: at 05:46

## 2020-06-20 NOTE — PDOC DISCHARGE SUMMARY
Impression





- Admit/DC Date/PCP


Admission Date/Primary Care Provider: 


  06/16/20 19:28





  





Discharge Date: 06/20/20





- Discharge Diagnosis


(1) Acute kidney injury


Is this a current diagnosis for this admission?: Yes   





(2) Metabolic acidosis


Is this a current diagnosis for this admission?: Yes   





(3) Hypomagnesemia


Is this a current diagnosis for this admission?: Yes   





(4) Hypothyroidism


Is this a current diagnosis for this admission?: Yes   





(5) COPD (chronic obstructive pulmonary disease)


Is this a current diagnosis for this admission?: Yes   





(6) Hypokalemia


Is this a current diagnosis for this admission?: Yes   





- Additional Information


Resuscitation Status: Full Code


Discharge Diet: As Tolerated


Discharge Activity: Activity As Tolerated


Referrals: 


[facility] [Provider Group]


REMA ECHOLS MD [ACTIVE STAFF] -  (APPT NEEDED ASAP)


TALON YAN MD [ACTIVE STAFF] - 


Prescriptions: 


Potassium Chloride [K-Tab ER] 20 meq PO BID #40 tablet.er


Sodium Bicarbonate [Sodium Bicarbonate 650 mg Tablet] 650 mg PO Q12A 15 Days  

tablet


Levothyroxine Sodium [Synthroid 0.1 mg Tablet] 0.2 mg PO Q6AM 30 Days  tablet


Home Medications: 








Oxybutynin Chloride [Ditropan 5 mg Tablet] 5 mg PO TID #0 03/09/12 


Pravastatin Sodium [Pravachol] 20 mg PO QHS 12/28/16 


Atenolol [Tenormin] 25 mg PO QAM 01/23/20 


Diphenoxylate HCl/Atrop Sulf [Lomotil 2.5 mg Tablet] 2 tab PO TID 04/07/20 


Hydroxyzine Pamoate [Vistaril 25 mg Capsule] 25 mg PO Q8HP PRN 04/07/20 


Megestrol Acetate [Megace Chen 400 mg/10 ml Udcup] 800 mg PO DAILY #60 udc 

04/11/20 


Aspirin [Ecotrin 81 mg EC Tablet] 81 mg PO DAILY 06/16/20 


Diphenhydramine HCl [Benadryl 25 mg Capsule] 25 mg PO TIDP PRN 06/16/20 


Fluticasone/Salmeterol [Advair 250-50 Diskus 14 Dose/Diskus] 1 inh IH Q12 

06/16/20 


Levothyroxine Sodium [Synthroid 0.1 mg Tablet] 0.2 mg PO Q6AM 30 Days  tablet 

06/20/20 


Loperamide HCl [Imodium 2 mg Capsule] 2 mg PO Q6HP PRN #21 cap 06/20/20 


Magnesium Oxide [Magnesium] 400 mg PO DAILY #30 tablet 06/20/20 


Potassium Chloride [K-Tab ER] 20 meq PO BID #40 tablet.er 06/20/20 


Sodium Bicarbonate [Sodium Bicarbonate 650 mg Tablet] 650 mg PO Q12A 15 Days  

tablet 06/20/20 











History of Present Illiness


History of Present Illness: 


According to admitting provider: SERGEI BAKER is a 64 year old female who 

presented to the emergency room with a 10-day history of lightheadedness.  She 

admits a 10-day history of progressively worsening lightheadedness and dizziness

upon standing or participating in any activity.  Her lightheadedness has been 

accompanied by progressively worsening dyspnea on exertion and has been 

associated with morning nausea and vomiting which she relates to taking her 

morning medications.  She denies other associated or accompanying signs and 

symptoms.  She admits numerous prior similar episodes related to her chronic 

gastrointestinal blood loss and episodes of acute renal failure.  She has not 

identified any additional aggravating or ameliorating factors for her 

lightheadedness.  In the emergency room she was found to have a creatinine of 

3.7 and a BUN of 61 and was subsequently admitted to the hospital for further 

evaluation and treatment.








Hospital Course


Hospital Course: 


Patient was admitted with acute kidney injury.  She was also noted to be 

hypotensive.  Her creatinine was 3.4 on admission compared to prior baseline of 

0.91-1 at the time of her last discharge.  She was also hypokalemic and hypo-

magnesium with non-anion gap metabolic acidosis and bicarbonate of 12 on BMP. 

Once again this seems to be secondary to dehydration with prerenal azotemia.  

Her findings are consistent with dehydrated state which I suspect to be due to 

excessive ostomy output quite similar to her prior presentation 2 months ago at 

which time her C. difficile test was negative.  Patient was started on 

aggressive IV fluid hydration and giving sodium bicarbonate to replenish the 

bicarbonate loss.  She was placed on lactated Ringer's.  Her blood pressure 

improved and her renal function has shown continued improvement and is down to 

1.28 today.  Patient is doing well and would like to go home.  Am sending 

patient home with potassium and magnesium supplements.  I have encouraged 

patient to stay well-hydrated to maintain an even fluid balance.  Also 

discharging patient with a few days of bicarb to help replenish have bicarbonate

loss.  Patient also being discharged with Imodium as needed for 

diarrhea/increased ostomy output.  Patient has been instructed to have a repeat 

BMP in 1 to 2 weeks and to follow-up with nephrology and her PCP for further 

care and monitoring of her renal function.  She will also be following up with 

surgeon Dr. Echols about her ostomy.





Physical Exam


Vital Signs: 


                                        











Temp Pulse Resp BP Pulse Ox


 


 98.4 F   94   17   158/80 H  94 


 


 06/20/20 08:00  06/20/20 08:00  06/20/20 08:00  06/20/20 08:00  06/20/20 08:00








                                 Intake & Output











 06/19/20 06/20/20 06/21/20





 06:59 06:59 06:59


 


Intake Total 3680 2950 


 


Output Total 1251 900 


 


Balance 2429 2050 


 


Weight 74.5 kg 74.5 kg 











General appearance: PRESENT: no acute distress, cooperative


Neck exam: ABSENT: JVD


Neurological exam: PRESENT: alert, awake





Results


Laboratory Results: 


                                        











WBC  5.9 10^3/uL (4.0-10.5)   06/20/20  08:00    


 


RBC  3.14 10^6/uL (3.72-5.28)  L  06/20/20  08:00    


 


Hgb  10.0 g/dL (12.0-15.5)  L  06/20/20  08:00    


 


Hct  29.8 % (36.0-47.0)  L  06/20/20  08:00    


 


MCV  95 fl (80-97)   06/20/20  08:00    


 


MCH  31.7 pg (27.0-33.4)   06/20/20  08:00    


 


MCHC  33.4 g/dL (32.0-36.0)   06/20/20  08:00    


 


RDW  16.4 % (11.5-14.0)  H  06/20/20  08:00    


 


Plt Count  253 10^3/uL (150-450)   06/20/20  08:00    


 


Lymph % (Auto)  8.5 % (13-45)  L  06/16/20  16:00    


 


Mono % (Auto)  7.1 % (3-13)   06/16/20  16:00    


 


Eos % (Auto)  0.3 % (0-6)   06/16/20  16:00    


 


Baso % (Auto)  0.7 % (0-2)   06/16/20  16:00    


 


Absolute Neuts (auto)  8.6 10^3/uL (1.7-8.2)  H  06/16/20  16:00    


 


Absolute Lymphs (auto)  0.9 10^3/uL (0.5-4.7)   06/16/20  16:00    


 


Absolute Monos (auto)  0.7 10^3/uL (0.1-1.4)   06/16/20  16:00    


 


Absolute Eos (auto)  0.0 10^3/uL (0.0-0.6)   06/16/20  16:00    


 


Absolute Basos (auto)  0.1 10^3/uL (0.0-0.2)   06/16/20  16:00    


 


Seg Neutrophils %  83.4 % (42-78)  H  06/16/20  16:00    


 


Sodium  138.7 mmol/L (137-145)   06/20/20  08:00    


 


Potassium  3.4 mmol/L (3.6-5.0)  L  06/20/20  08:00    


 


Chloride  114 mmol/L ()  H  06/20/20  08:00    


 


Carbon Dioxide  19 mmol/L (22-30)  L  06/20/20  08:00    


 


Anion Gap  6  (5-19)   06/20/20  08:00    


 


BUN  16 mg/dL (7-20)   06/20/20  08:00    


 


Creatinine  1.28 mg/dL (0.52-1.25)  H  06/20/20  08:00    


 


Est GFR ( Amer)  51  (>60)  L  06/20/20  08:00    


 


Est GFR (MDRD) Non-Af  42  (>60)  L  06/20/20  08:00    


 


Glucose  111 mg/dL ()  H  06/20/20  08:00    


 


Calcium  8.9 mg/dL (8.4-10.2)   06/20/20  08:00    


 


Magnesium  1.6 mg/dL (1.6-2.3)   06/20/20  08:00    


 


Total Bilirubin  0.3 mg/dL (0.2-1.3)   06/16/20  16:00    


 


Direct Bilirubin  0.0 mg/dL (0.0-0.4)   06/16/20  16:00    


 


Neonat Total Bilirubin  Not Reportable   06/16/20  16:00    


 


Neonat Direct Bilirubin  Not Reportable   06/16/20  16:00    


 


Neonat Indirect Bili  Not Reportable   06/16/20  16:00    


 


AST  23 U/L (14-36)   06/16/20  16:00    


 


ALT  12 U/L (<35)   06/16/20  16:00    


 


Alkaline Phosphatase  172 U/L ()  H  06/16/20  16:00    


 


Creatine Kinase  206 U/L ()  H  06/16/20  16:00    


 


Troponin I  0.095 ng/mL  06/16/20  16:00    


 


NT-Pro-B Natriuret Pep  844 pg/mL (<125)  H  06/16/20  16:00    


 


Total Protein  8.2 g/dL (6.3-8.2)   06/16/20  16:00    


 


Albumin  4.7 g/dL (3.5-5.0)   06/16/20  16:00    


 


TSH  51.30 uIU/mL (0.47-4.68)  H  06/17/20  04:50    


 


Free T4  0.96 ng/dL (0.78-2.19)   06/16/20  16:00    


 


Free T3 pg/mL  2.50 pg/mL (2.77-5.27)  L  06/16/20  16:00    


 


Urine Color  YELLOW   06/17/20  02:30    


 


Urine Appearance  TURBID   06/17/20  02:30    


 


Urine pH  6.0  (5.0-9.0)   06/17/20  02:30    


 


Ur Specific Gravity  1.014   06/17/20  02:30    


 


Urine Protein  100 mg/dL (NEGATIVE)  H  06/17/20  02:30    


 


Urine Glucose (UA)  NEGATIVE mg/dL (NEGATIVE)   06/17/20  02:30    


 


Urine Ketones  NEGATIVE mg/dL (NEGATIVE)   06/17/20  02:30    


 


Urine Blood  MODERATE  (NEGATIVE)  H  06/17/20  02:30    


 


Urine Nitrite  NEGATIVE  (NEGATIVE)   06/17/20  02:30    


 


Urine Bilirubin  NEGATIVE  (NEGATIVE)   06/17/20  02:30    


 


Urine Urobilinogen  NEGATIVE mg/dL (<2.0)   06/17/20  02:30    


 


Ur Leukocyte Esterase  LARGE  (NEGATIVE)  H  06/17/20  02:30    


 


Urine WBC (Auto)  >182 /HPF  06/17/20  02:30    


 


Urine RBC (Auto)  39 /HPF  06/17/20  02:30    


 


U Hyaline Cast (Auto)  9 /LPF  06/17/20  02:30    


 


Urine Bacteria (Auto)  2+ /HPF  06/17/20  02:30    


 


Urine WBC Clumps  MANY /HPF  06/17/20  02:30    


 


Squamous Epi Cells Auto  19 /HPF  06/17/20  02:30    


 


U Non-Squamous Epis Auto  1 /HPF  06/17/20  02:30    


 


Urine Mucus (Auto)  FEW /LPF  06/17/20  02:30    


 


Urine Ascorbic Acid  NEGATIVE  (NEGATIVE)   06/17/20  02:30    


 


Blood Type  B POSITIVE   06/16/20  16:00    


 


Antibody Screen  NEGATIVE   06/16/20  16:00    








                                        











  06/16/20





  16:00


 


Troponin I  0.095


 


NT-Pro-B Natriuret Pep  844 H











Impressions: 


                                        





Chest X-Ray  06/16/20 15:42


IMPRESSION:  1.  No significant interval change since the prior study dated 

3/14/2020.  No acute finding.


 














Plan


Time Spent: Less than 30 Minutes





Stroke


Is this a Stroke Patient?: No





Acute Heart Failure





- **


Is this a Heart Failure Patient?: No

## 2020-06-30 ENCOUNTER — HOSPITAL ENCOUNTER (OUTPATIENT)
Dept: HOSPITAL 62 - RAD | Age: 65
End: 2020-06-30
Attending: SURGERY
Payer: MEDICAID

## 2020-06-30 DIAGNOSIS — C20: Primary | ICD-10-CM

## 2020-06-30 PROCEDURE — 74270 X-RAY XM COLON 1CNTRST STD: CPT

## 2020-06-30 NOTE — RADIOLOGY REPORT (SQ)
EXAM DESCRIPTION:  BARIUM ENEMA



IMAGES COMPLETED DATE/TIME:  6/30/2020 11:50 am



REASON FOR STUDY:  C20 MALIGNANT NEOPLASM OF RECTUM C20  MALIGNANT NEOPLASM OF RECTUM



COMPARISON:  None.



FLUOROSCOPY TIME:  2.4 minutes

14 images saved to PACS.



TECHNIQUE:  Following retrograde filling of the colon with water-soluble contrast, fluoroscopic spot 
and overhead imaging of the colon was obtained and saved to PACS.



LIMITATIONS:  Incomplete distention of the colon as the patient was unable to retain contrast.



FINDINGS:   KUB: Non obstructive bowel gas pattern.  Right lower quadrant ileostomy

ASCENDING COLON: No masses, strictures, or perforations.  Blind end at the cecum near ileostomy site.


TRANSVERSE COLON: No masses, strictures, or perforations.

DESCENDING COLON: No masses, strictures, or perforations.

SIGMOID COLON: Mucosal irregularity with scattered diverticuli seen.  No definite strictures.  No mas
ses or perforations.

RECTUM: No masses, strictures, or perforations.

POST EVAC: Near complete evacuation of contrast.

OTHER: No other significant finding.



IMPRESSION:  IRREGULARITY OF THE SIGMOID COLON WITH SCATTERED DIVERTICULI SEEN.  SUBOPTIMAL COLONIC D
ISTENSION DUE TO PATIENT INABILITY TO RETAIN CONTRAST, HOWEVER NO DEFINITE STRICTURE IDENTIFIED.



COMMENT:  NONE

Quality :  Final reports for procedures using fluoroscopy that document radiation exposure mary
eliane, or exposure time and number of fluorographic images (if radiation exposure indices are not avail
able)



TECHNICAL DOCUMENTATION:  JOB ID:  [id number]

 2011 [employer]- All Rights Reserved



Reading location - IP/workstation name: [email or web address] [id number]

## 2020-07-01 ENCOUNTER — HOSPITAL ENCOUNTER (EMERGENCY)
Dept: HOSPITAL 62 - ER | Age: 65
Discharge: LEFT BEFORE DISCHARGE | End: 2020-07-01
Payer: MEDICAID

## 2020-07-01 VITALS — DIASTOLIC BLOOD PRESSURE: 86 MMHG | SYSTOLIC BLOOD PRESSURE: 118 MMHG

## 2020-07-01 DIAGNOSIS — Z53.20: ICD-10-CM

## 2020-07-01 DIAGNOSIS — Z88.2: ICD-10-CM

## 2020-07-01 DIAGNOSIS — Z91.013: ICD-10-CM

## 2020-07-01 DIAGNOSIS — I10: ICD-10-CM

## 2020-07-01 DIAGNOSIS — Z90.49: ICD-10-CM

## 2020-07-01 DIAGNOSIS — Z85.048: ICD-10-CM

## 2020-07-01 DIAGNOSIS — I25.10: ICD-10-CM

## 2020-07-01 DIAGNOSIS — J44.9: ICD-10-CM

## 2020-07-01 DIAGNOSIS — Z43.3: Primary | ICD-10-CM

## 2020-07-01 PROCEDURE — 99281 EMR DPT VST MAYX REQ PHY/QHP: CPT

## 2020-07-01 NOTE — ER DOCUMENT REPORT
ED Medical Screen (RME)





- General


Chief Complaint: ladan


Stated Complaint: STOMA PROBLEM


Time Seen by Provider: 20 21:15


Primary Care Provider: 


REMA DUONG MD [Primary Care Provider] - Follow up as needed


Information source: Patient


Notes: 





HPI; 64-year-old female presents emergency room stating that she ran out of 

stoma bags for her colostomy.  States she used her last one today.  She was post

receive a delivery from the pharmacy but they did not arrive.  States home 

health came out and did not have any additional colostomy bags and sent to the 

emergency room.  States she has the area wrapped in washcloths and towels.





PE: Alert and oriented x3.  Moderate distress noted.  Lungs: Clear to 

auscultation without rales, rhonchi, wheezes.  Heart: Regular rate and rhythm 

without murmurs, rubs, gallops.  Unable to assess stoma site in triage.








I have greeted and performed a rapid initial assessment of this patient.  A 

comprehensive ED assessment and evaluation of the patient, analysis of test 

results and completion of the medical decision making process will be conducted 

by additional ED providers.  I have specifically instructed the patient or 

family members with the patient to immediately return to any nursing staff 

should anything change in the patient's condition or with their chief complaint.


TRAVEL OUTSIDE OF THE [place] IN LAST 30 DAYS: No





- Related Data


Allergies/Adverse Reactions: 


                                        





shellfish derived Allergy (Severe, Verified 20 02:17)


   Hives


iodine [Iodine] Allergy (Intermediate, Verified 20 02:17)


   Rash


Sulfa (Sulfonamide Antibiotics) Allergy (Intermediate, Verified 20 02:17)


   Hives


Iodinated Contrast Media Adverse Reaction (Intermediate, Verified 20 

02:17)


   Nausea











Past Medical History





- Social History


Chew tobacco use (# tins/day): No


Frequency of alcohol use: None


Drug Abuse: None





- Past Medical History


Cardiac Medical History: Reports: Hx Coronary Artery Disease, Hx Heart Attack - 

2010, Hx Hypertension


   Denies: Hx Atrial Fibrillation, Hx Congestive Heart Failure, Hx 

Hypercholesterolemia, Hx Peripheral Vascular Disease, Hx Pulmonary Embolism, Hx 

Heart Murmur


Pulmonary Medical History: Reports: Hx Asthma, Hx Bronchitis, Hx COPD, Hx 

Pneumonia - 2015, Hx Respiratory Failure


   Denies: Hx Sleep Apnea, Hx Tuberculosis


Neurological Medical History: Denies: Hx Cerebrovascular Accident, Hx Seizures, 

Hx Parkinson's Disease


Endocrine Medical History: Reports: Hx Hyperthyroidism - Treated with 

radioactive iodine, Hx Hypothyroidism - Post ablation, takes thyroid replacement

 hormone.  Denies: Hx Diabetes Mellitus Type 1, Hx Diabetes Mellitus Type 2


Renal/ Medical History: Reports: Hx Renal Insufficiency


Malignancy Medical History: Reports: Hx Colorectal Cancer.  Denies: Hx Leukemia,

 Hx Lung Cancer


GI Medical History: Reports: Hx Gastroesophageal Reflux Disease.  Denies: Hx 

Cirrhosis, Hx Crohn's Disease, Hx Hepatitis, Hx Hiatal Hernia, Hx Irritable 

Bowel, Hx Liver Failure, Hx Pancreatitis, Hx Ulcer, Hx Ulcerative Colitis


Musculoskeltal Medical History: Reports Hx Arthritis, Denies Hx Fibromyalgia, 

Denies Hx Multiple Sclerosis, Denies Hx Muscular Dystrophy, Denies Hx Systemic 

Lupus Erythematosus


Skin Medical History: Denies Hx Eczema, Denies Hx Psoriasis


Psychiatric Medical History: Reports: Hx Depression


   Denies: Hx Bipolar Disorder, Hx Dementia, Hx Post Traumatic Stress Disorder, 

Hx Schizophrenia


Traumatic Medical History: Denies: Hx Fractures


Infectious Medical History: Denies: Hx Hepatitis, Hx HIV


Past Surgical History: Reports: Hx Abdominal Surgery - COLOSTOMY, Hx Ileostomy -

 With Low anterior resection and total colectomy-mesal-rectal, Hx Tubal 

Ligation, Other - Tumor removal of the throat which was benign.  Denies: Hx 

Appendectomy, Hx Bowel Surgery, Hx  Section, Hx Cholecystectomy, Hx 

Colostomy, Hx Coronary Artery Bypass Graft, Hx Gastric Bypass Surgery, Hx He

rniorrhaphy, Hx Hysterectomy, Hx Mastectomy, Hx Open Heart Surgery, Hx 

Pacemaker, Hx Tonsillectomy





- Immunizations


Hx Diphtheria, Pertussis, Tetanus Vaccination: Yes - not sure





Physical Exam





- Vital signs


Vitals: 





                                        











Temp


 


 97.7 F 


 


 20 21:14














Course





- Vital Signs


Vital signs: 





                                        











Temp Pulse Resp BP Pulse Ox


 


 97.7 F   75   17   118/86 H  100 


 


 20 21:21  20 21:21  20 21:21  20 21:21  20 21:21














Doctor's Discharge





- Discharge


Referrals: 


REMA DUONG MD [Primary Care Provider] - Follow up as needed

## 2020-07-30 ENCOUNTER — HOSPITAL ENCOUNTER (OUTPATIENT)
Dept: HOSPITAL 62 - OD | Age: 65
End: 2020-07-30
Attending: SURGERY
Payer: MEDICAID

## 2020-07-30 DIAGNOSIS — Z43.2: ICD-10-CM

## 2020-07-30 DIAGNOSIS — F41.8: ICD-10-CM

## 2020-07-30 DIAGNOSIS — J44.9: ICD-10-CM

## 2020-07-30 DIAGNOSIS — R19.7: ICD-10-CM

## 2020-07-30 DIAGNOSIS — Z01.818: Primary | ICD-10-CM

## 2020-07-30 DIAGNOSIS — I11.9: ICD-10-CM

## 2020-07-30 DIAGNOSIS — E78.00: ICD-10-CM

## 2020-07-30 DIAGNOSIS — Z03.818: ICD-10-CM

## 2020-07-30 DIAGNOSIS — M19.90: ICD-10-CM

## 2020-07-30 DIAGNOSIS — C20: ICD-10-CM

## 2020-07-30 LAB
ANION GAP SERPL CALC-SCNC: 10 MMOL/L (ref 5–19)
BUN SERPL-MCNC: 29 MG/DL (ref 7–20)
CALCIUM: 9.6 MG/DL (ref 8.4–10.2)
CHLORIDE SERPL-SCNC: 110 MMOL/L (ref 98–107)
CO2 SERPL-SCNC: 20 MMOL/L (ref 22–30)
ERYTHROCYTE [DISTWIDTH] IN BLOOD BY AUTOMATED COUNT: 14.6 % (ref 11.5–14)
GLUCOSE SERPL-MCNC: 90 MG/DL (ref 75–110)
HCT VFR BLD CALC: 35.2 % (ref 36–47)
HGB BLD-MCNC: 11.5 G/DL (ref 12–15.5)
MCH RBC QN AUTO: 30.8 PG (ref 27–33.4)
MCHC RBC AUTO-ENTMCNC: 32.6 G/DL (ref 32–36)
MCV RBC AUTO: 94 FL (ref 80–97)
PLATELET # BLD: 340 10^3/UL (ref 150–450)
POTASSIUM SERPL-SCNC: 4.5 MMOL/L (ref 3.6–5)
RBC # BLD AUTO: 3.74 10^6/UL (ref 3.72–5.28)
WBC # BLD AUTO: 5.5 10^3/UL (ref 4–10.5)

## 2020-07-30 PROCEDURE — 93005 ELECTROCARDIOGRAM TRACING: CPT

## 2020-07-30 PROCEDURE — 85027 COMPLETE CBC AUTOMATED: CPT

## 2020-07-30 PROCEDURE — 36415 COLL VENOUS BLD VENIPUNCTURE: CPT

## 2020-07-30 PROCEDURE — 80048 BASIC METABOLIC PNL TOTAL CA: CPT

## 2020-07-30 PROCEDURE — 87635 SARS-COV-2 COVID-19 AMP PRB: CPT

## 2020-07-30 PROCEDURE — C9803 HOPD COVID-19 SPEC COLLECT: HCPCS

## 2020-07-30 PROCEDURE — 93010 ELECTROCARDIOGRAM REPORT: CPT

## 2020-07-30 NOTE — EKG REPORT
SEVERITY:- ABNORMAL ECG -

SINUS RHYTHM

PROBABLE INFERIOR INFARCT, OLD

:

Confirmed by: Brittany Moon MD 30-Jul-2020 17:57:05

## 2020-08-04 VITALS — DIASTOLIC BLOOD PRESSURE: 85 MMHG | SYSTOLIC BLOOD PRESSURE: 122 MMHG

## 2020-08-11 ENCOUNTER — HOSPITAL ENCOUNTER (INPATIENT)
Dept: HOSPITAL 62 - INOR | Age: 65
LOS: 5 days | Discharge: HOME | DRG: 330 | End: 2020-08-16
Attending: SURGERY | Admitting: SURGERY
Payer: MEDICAID

## 2020-08-11 DIAGNOSIS — Z79.52: ICD-10-CM

## 2020-08-11 DIAGNOSIS — Z91.013: ICD-10-CM

## 2020-08-11 DIAGNOSIS — F17.210: ICD-10-CM

## 2020-08-11 DIAGNOSIS — I10: ICD-10-CM

## 2020-08-11 DIAGNOSIS — Z79.82: ICD-10-CM

## 2020-08-11 DIAGNOSIS — J44.9: ICD-10-CM

## 2020-08-11 DIAGNOSIS — Z88.2: ICD-10-CM

## 2020-08-11 DIAGNOSIS — C20: ICD-10-CM

## 2020-08-11 DIAGNOSIS — E78.00: ICD-10-CM

## 2020-08-11 DIAGNOSIS — Z43.2: Primary | ICD-10-CM

## 2020-08-11 DIAGNOSIS — E03.9: ICD-10-CM

## 2020-08-11 PROCEDURE — 88305 TISSUE EXAM BY PATHOLOGIST: CPT

## 2020-08-11 PROCEDURE — C1758 CATHETER, URETERAL: HCPCS

## 2020-08-11 PROCEDURE — 36415 COLL VENOUS BLD VENIPUNCTURE: CPT

## 2020-08-11 PROCEDURE — 74018 RADEX ABDOMEN 1 VIEW: CPT

## 2020-08-11 PROCEDURE — 71045 X-RAY EXAM CHEST 1 VIEW: CPT

## 2020-08-11 PROCEDURE — 80048 BASIC METABOLIC PNL TOTAL CA: CPT

## 2020-08-11 PROCEDURE — 0DBN8ZX EXCISION OF SIGMOID COLON, VIA NATURAL OR ARTIFICIAL OPENING ENDOSCOPIC, DIAGNOSTIC: ICD-10-PCS | Performed by: SURGERY

## 2020-08-11 PROCEDURE — 45331 SIGMOIDOSCOPY AND BIOPSY: CPT

## 2020-08-11 PROCEDURE — 85025 COMPLETE CBC W/AUTO DIFF WBC: CPT

## 2020-08-11 PROCEDURE — 0DQB0ZZ REPAIR ILEUM, OPEN APPROACH: ICD-10-PCS | Performed by: SURGERY

## 2020-08-11 PROCEDURE — 74019 RADEX ABDOMEN 2 VIEWS: CPT

## 2020-08-11 PROCEDURE — 82962 GLUCOSE BLOOD TEST: CPT

## 2020-08-11 RX ADMIN — FENTANYL CITRATE ONE MCG: 50 INJECTION INTRAMUSCULAR; INTRAVENOUS at 13:12

## 2020-08-11 RX ADMIN — DEXAMETHASONE SODIUM PHOSPHATE PRN MG: 10 INJECTION INTRAMUSCULAR; INTRAVENOUS at 17:15

## 2020-08-11 RX ADMIN — HYDROCODONE BITARTRATE AND ACETAMINOPHEN ONE TAB: 10; 325 TABLET ORAL at 16:25

## 2020-08-11 RX ADMIN — HYDROCODONE BITARTRATE AND ACETAMINOPHEN ONE: 10; 325 TABLET ORAL at 16:54

## 2020-08-11 RX ADMIN — IBUPROFEN SCH: 800 TABLET, FILM COATED ORAL at 18:35

## 2020-08-11 RX ADMIN — FAMOTIDINE SCH MG: 20 TABLET, FILM COATED ORAL at 21:57

## 2020-08-11 RX ADMIN — HYDROCODONE BITARTRATE AND ACETAMINOPHEN PRN TAB: 10; 325 TABLET ORAL at 23:06

## 2020-08-11 RX ADMIN — FENTANYL CITRATE ONE MCG: 50 INJECTION INTRAMUSCULAR; INTRAVENOUS at 13:25

## 2020-08-11 RX ADMIN — DEXTROSE SCH MLS/HR: 50 INJECTION, SOLUTION INTRAVENOUS at 18:35

## 2020-08-11 RX ADMIN — MORPHINE SULFATE PRN MG: 10 INJECTION INTRAMUSCULAR; INTRAVENOUS; SUBCUTANEOUS at 20:02

## 2020-08-11 NOTE — RADIOLOGY REPORT (SQ)
EXAM DESCRIPTION:  CHEST SINGLE VIEW



IMAGES COMPLETED DATE/TIME:  8/11/2020 9:34 am



REASON FOR STUDY:  PRE OP



COMPARISON:  6/16/2020.



EXAM PARAMETERS:  NUMBER OF VIEWS: One view.

TECHNIQUE: Single frontal radiographic view of the chest acquired.

RADIATION DOSE: NA

LIMITATIONS: None.



FINDINGS:  LUNGS AND PLEURA: No opacities, masses or pneumothorax. No pleural effusion.

MEDIASTINUM AND HILAR STRUCTURES: No masses.  Contour normal.

HEART AND VASCULAR STRUCTURES: Heart normal in size.  Normal vasculature.

BONES: No acute findings.

HARDWARE: Vascular port.

OTHER: Hiatal hernia.  No other significant finding.



IMPRESSION:  NO ACUTE RADIOGRAPHIC FINDING IN THE CHEST.



TECHNICAL DOCUMENTATION:  JOB ID:  [id number]

 2011 [employer]- All Rights Reserved



Reading location - IP/workstation name: [email or web address]

## 2020-08-12 LAB
ADD MANUAL DIFF: NO
ANION GAP SERPL CALC-SCNC: 8 MMOL/L (ref 5–19)
BASOPHILS # BLD AUTO: 0 10^3/UL (ref 0–0.2)
BASOPHILS NFR BLD AUTO: 0.4 % (ref 0–2)
BUN SERPL-MCNC: 31 MG/DL (ref 7–20)
CALCIUM: 8.3 MG/DL (ref 8.4–10.2)
CHLORIDE SERPL-SCNC: 109 MMOL/L (ref 98–107)
CO2 SERPL-SCNC: 18 MMOL/L (ref 22–30)
EOSINOPHIL # BLD AUTO: 0 10^3/UL (ref 0–0.6)
EOSINOPHIL NFR BLD AUTO: 0 % (ref 0–6)
ERYTHROCYTE [DISTWIDTH] IN BLOOD BY AUTOMATED COUNT: 15.3 % (ref 11.5–14)
GLUCOSE SERPL-MCNC: 125 MG/DL (ref 75–110)
HCT VFR BLD CALC: 34.8 % (ref 36–47)
HGB BLD-MCNC: 11.5 G/DL (ref 12–15.5)
LYMPHOCYTES # BLD AUTO: 0.4 10^3/UL (ref 0.5–4.7)
LYMPHOCYTES NFR BLD AUTO: 5.4 % (ref 13–45)
MCH RBC QN AUTO: 31.3 PG (ref 27–33.4)
MCHC RBC AUTO-ENTMCNC: 33 G/DL (ref 32–36)
MCV RBC AUTO: 95 FL (ref 80–97)
MONOCYTES # BLD AUTO: 0.4 10^3/UL (ref 0.1–1.4)
MONOCYTES NFR BLD AUTO: 5.8 % (ref 3–13)
NEUTROPHILS # BLD AUTO: 6.2 10^3/UL (ref 1.7–8.2)
NEUTS SEG NFR BLD AUTO: 88.4 % (ref 42–78)
PLATELET # BLD: 265 10^3/UL (ref 150–450)
POTASSIUM SERPL-SCNC: 4.1 MMOL/L (ref 3.6–5)
RBC # BLD AUTO: 3.67 10^6/UL (ref 3.72–5.28)
TOTAL CELLS COUNTED % (AUTO): 100 %
WBC # BLD AUTO: 7.1 10^3/UL (ref 4–10.5)

## 2020-08-12 RX ADMIN — FAMOTIDINE SCH MG: 20 TABLET, FILM COATED ORAL at 22:03

## 2020-08-12 RX ADMIN — DEXAMETHASONE SODIUM PHOSPHATE PRN MG: 10 INJECTION INTRAMUSCULAR; INTRAVENOUS at 10:44

## 2020-08-12 RX ADMIN — MORPHINE SULFATE PRN MG: 10 INJECTION INTRAMUSCULAR; INTRAVENOUS; SUBCUTANEOUS at 19:00

## 2020-08-12 RX ADMIN — ENOXAPARIN SODIUM SCH MG: 40 INJECTION SUBCUTANEOUS at 09:33

## 2020-08-12 RX ADMIN — MORPHINE SULFATE PRN MG: 10 INJECTION INTRAMUSCULAR; INTRAVENOUS; SUBCUTANEOUS at 01:34

## 2020-08-12 RX ADMIN — HYDROCODONE BITARTRATE AND ACETAMINOPHEN PRN TAB: 10; 325 TABLET ORAL at 04:42

## 2020-08-12 RX ADMIN — SODIUM CHLORIDE PRN MLS/HR: 9 INJECTION, SOLUTION INTRAVENOUS at 01:36

## 2020-08-12 RX ADMIN — FAMOTIDINE SCH MG: 20 TABLET, FILM COATED ORAL at 09:33

## 2020-08-12 RX ADMIN — DEXAMETHASONE SODIUM PHOSPHATE PRN MG: 10 INJECTION INTRAMUSCULAR; INTRAVENOUS at 02:49

## 2020-08-12 RX ADMIN — IBUPROFEN SCH: 800 TABLET, FILM COATED ORAL at 09:00

## 2020-08-12 RX ADMIN — MORPHINE SULFATE PRN MG: 10 INJECTION INTRAMUSCULAR; INTRAVENOUS; SUBCUTANEOUS at 14:09

## 2020-08-12 RX ADMIN — DEXTROSE SCH MLS/HR: 50 INJECTION, SOLUTION INTRAVENOUS at 01:34

## 2020-08-12 NOTE — PDOC PROGRESS REPORT
Subjective


Progress Note for:: 08/12/20


Reason For Visit: 


C20 MALIGNANT NEOPLASM OF RECTUM








Physical Exam


Vital Signs: 


                                        











Temp Pulse Resp BP Pulse Ox


 


 98.0 F   87   17   116/68   95 


 


 08/12/20 07:44  08/12/20 07:44  08/12/20 07:44  08/12/20 07:44  08/12/20 07:44








                                 Intake & Output











 08/11/20 08/12/20 08/13/20





 06:59 06:59 06:59


 


Intake Total  4000 100


 


Output Total  180 200


 


Balance  3820 -100


 


Weight  77.5 kg 














Results


Laboratory Results: 


                                        





                                 08/12/20 07:36 





                                 08/12/20 07:36 





                                        











  08/12/20 08/12/20





  07:36 07:36


 


WBC  7.1 


 


RBC  3.67 L 


 


Hgb  11.5 L 


 


Hct  34.8 L 


 


MCV  95 


 


MCH  31.3 


 


MCHC  33.0 


 


RDW  15.3 H 


 


Plt Count  265 


 


Seg Neutrophils %  88.4 H 


 


Sodium   135.3 L


 


Potassium   4.1


 


Chloride   109 H


 


Carbon Dioxide   18 L


 


Anion Gap   8


 


BUN   31 H


 


Creatinine   1.45 H


 


Est GFR (African Amer)   44 L


 


Glucose   125 H


 


Calcium   8.3 L











Impressions: 


                                        





Chest X-Ray  08/11/20 00:00


IMPRESSION:  NO ACUTE RADIOGRAPHIC FINDING IN THE CHEST.


 














Assessment & Plan





- Diagnosis


(1) Ileostomy present


Is this a current diagnosis for this admission?: Yes   





(2) History of rectal cancer


Is this a current diagnosis for this admission?: Yes   





- Time


Anticipated Discharge Disposition: Home, Self Care


Anticipated Discharge Timeframe: within 24 hours





- Plan Summary


Plan Summary: 





64-year-old female with a history of rectal cancer, status post low anterior 

resection.  She had a diverting ileostomy at the time of her initial procedure. 

She is several months out from diverting ileostomy placement.  Yesterday she 

underwent flexible sigmoidoscopy and reversal of her ileostomy.  Her flexible 

sigmoidoscopy showed no significant findings.  There is a small mucosal 

irregularity, consistent with inflammatory tissue.  Her ileostomy was reversed 

yesterday.  She reports passing flatus this morning.  She denies fevers or 

chills.  She does report pain at her surgical site.  She did have some nausea 

earlier this morning.  I will continue her on a full liquid diet until her 

nausea resolves.  Ambulate in the hallways.  Aggressive pulmonary toilet.

## 2020-08-13 LAB
ANION GAP SERPL CALC-SCNC: 8 MMOL/L (ref 5–19)
BUN SERPL-MCNC: 30 MG/DL (ref 7–20)
CALCIUM: 8 MG/DL (ref 8.4–10.2)
CHLORIDE SERPL-SCNC: 110 MMOL/L (ref 98–107)
CO2 SERPL-SCNC: 18 MMOL/L (ref 22–30)
GLUCOSE SERPL-MCNC: 94 MG/DL (ref 75–110)
POTASSIUM SERPL-SCNC: 3.7 MMOL/L (ref 3.6–5)

## 2020-08-13 RX ADMIN — MORPHINE SULFATE PRN MG: 10 INJECTION INTRAMUSCULAR; INTRAVENOUS; SUBCUTANEOUS at 09:25

## 2020-08-13 RX ADMIN — OXYBUTYNIN CHLORIDE SCH MG: 5 TABLET ORAL at 13:08

## 2020-08-13 RX ADMIN — OXYBUTYNIN CHLORIDE SCH MG: 5 TABLET ORAL at 21:28

## 2020-08-13 RX ADMIN — HYDROCODONE BITARTRATE AND ACETAMINOPHEN PRN TAB: 10; 325 TABLET ORAL at 11:57

## 2020-08-13 RX ADMIN — SERTRALINE HYDROCHLORIDE SCH MG: 50 TABLET ORAL at 09:26

## 2020-08-13 RX ADMIN — FAMOTIDINE SCH MG: 20 TABLET, FILM COATED ORAL at 21:28

## 2020-08-13 RX ADMIN — FAMOTIDINE SCH MG: 20 TABLET, FILM COATED ORAL at 09:26

## 2020-08-13 RX ADMIN — ENOXAPARIN SODIUM SCH MG: 40 INJECTION SUBCUTANEOUS at 09:26

## 2020-08-13 RX ADMIN — HYDROCODONE BITARTRATE AND ACETAMINOPHEN PRN TAB: 10; 325 TABLET ORAL at 18:41

## 2020-08-13 RX ADMIN — SIMVASTATIN SCH MG: 10 TABLET, FILM COATED ORAL at 21:28

## 2020-08-13 RX ADMIN — ATENOLOL SCH MG: 50 TABLET ORAL at 09:25

## 2020-08-13 RX ADMIN — LEVOTHYROXINE SODIUM SCH MG: 100 TABLET ORAL at 09:25

## 2020-08-13 RX ADMIN — FLUTICASONE FUROATE AND VILANTEROL TRIFENATATE SCH INHALER: 200; 25 POWDER RESPIRATORY (INHALATION) at 09:25

## 2020-08-13 RX ADMIN — MORPHINE SULFATE PRN MG: 10 INJECTION INTRAMUSCULAR; INTRAVENOUS; SUBCUTANEOUS at 04:19

## 2020-08-13 RX ADMIN — SODIUM CHLORIDE PRN MLS/HR: 9 INJECTION, SOLUTION INTRAVENOUS at 04:16

## 2020-08-13 RX ADMIN — ASPIRIN SCH MG: 81 TABLET, COATED ORAL at 09:26

## 2020-08-13 NOTE — RADIOLOGY REPORT (SQ)
EXAM DESCRIPTION:  ABDOMEN 2 VIEWS



IMAGES COMPLETED DATE/TIME:  8/13/2020 9:11 am



REASON FOR STUDY:  abdominal pain, s/p ileostomy reversal C20  MALIGNANT NEOPLASM OF RECTUM



COMPARISON:  4/6/2020



NUMBER OF VIEWS:  Two views.



TECHNIQUE:  Supine and erect/decubitus radiographic images of the abdomen acquired.



LIMITATIONS:  None.



FINDINGS:  There is contrast within nondilated descending and sigmoid colon.  Mildly dilated cecum an
d transverse colon with transition at level of splenic flexure.  Skin staples right lower quadrant.



IMPRESSION:  Ileus or partial colonic obstruction.



TECHNICAL DOCUMENTATION:  JOB ID:  [id number]

 2011 [employer]- All Rights Reserved



Reading location - IP/workstation name: [email or web address]

## 2020-08-13 NOTE — PDOC PROGRESS REPORT
Subjective


Progress Note for:: 08/13/20


Reason For Visit: 


C20 MALIGNANT NEOPLASM OF RECTUM








Physical Exam


Vital Signs: 


                                        











Temp Pulse Resp BP Pulse Ox


 


 98.8 F   91   16   111/73   88 L


 


 08/12/20 19:48  08/12/20 19:48  08/12/20 19:48  08/12/20 19:48  08/12/20 19:48








                                 Intake & Output











 08/12/20 08/13/20 08/14/20





 06:59 06:59 06:59


 


Intake Total 4000 1500 


 


Output Total 180 200 


 


Balance 3820 1300 


 


Weight 77.5 kg 77.5 kg 














Results


Laboratory Results: 


                                        





                                 08/12/20 07:36 





                                 08/13/20 05:39 





                                        











  08/13/20





  05:39


 


Sodium  135.8 L


 


Potassium  3.7


 


Chloride  110 H


 


Carbon Dioxide  18 L


 


Anion Gap  8


 


BUN  30 H


 


Creatinine  1.45 H


 


Est GFR (African Amer)  44 L


 


Glucose  94


 


Calcium  8.0 L











Impressions: 


                                        





Chest X-Ray  08/11/20 00:00


IMPRESSION:  NO ACUTE RADIOGRAPHIC FINDING IN THE CHEST.


 














Assessment & Plan





- Diagnosis


(1) Ileostomy present


Is this a current diagnosis for this admission?: Yes   





(2) History of rectal cancer


Is this a current diagnosis for this admission?: Yes   





- Time


Anticipated Discharge Disposition: Home, Self Care


Anticipated Discharge Timeframe: within 48 hours





- Plan Summary


Plan Summary: 





64-year-old female status post ileostomy reversal.  She is complaining of pain 

this morning, and refusing to get out of bed.  She is unhappy with her full 

liquid diet, but continues to complain of nausea and abdominal discomfort.  She 

has not had a bowel movement yet.  Her abdomen is soft and nondistended.  I will

obtain abdominal x-rays to evaluate her bowel gas pattern.  Home once she can 

tolerate a diet and has return of bowel function.

## 2020-08-14 LAB
ADD MANUAL DIFF: NO
ANION GAP SERPL CALC-SCNC: 5 MMOL/L (ref 5–19)
BASOPHILS # BLD AUTO: 0 10^3/UL (ref 0–0.2)
BASOPHILS NFR BLD AUTO: 0.5 % (ref 0–2)
BUN SERPL-MCNC: 27 MG/DL (ref 7–20)
CALCIUM: 7.8 MG/DL (ref 8.4–10.2)
CHLORIDE SERPL-SCNC: 113 MMOL/L (ref 98–107)
CO2 SERPL-SCNC: 19 MMOL/L (ref 22–30)
EOSINOPHIL # BLD AUTO: 0.1 10^3/UL (ref 0–0.6)
EOSINOPHIL NFR BLD AUTO: 2 % (ref 0–6)
ERYTHROCYTE [DISTWIDTH] IN BLOOD BY AUTOMATED COUNT: 15.9 % (ref 11.5–14)
GLUCOSE SERPL-MCNC: 89 MG/DL (ref 75–110)
HCT VFR BLD CALC: 24.9 % (ref 36–47)
HGB BLD-MCNC: 8.1 G/DL (ref 12–15.5)
LYMPHOCYTES # BLD AUTO: 0.4 10^3/UL (ref 0.5–4.7)
LYMPHOCYTES NFR BLD AUTO: 13.7 % (ref 13–45)
MCH RBC QN AUTO: 31.2 PG (ref 27–33.4)
MCHC RBC AUTO-ENTMCNC: 32.7 G/DL (ref 32–36)
MCV RBC AUTO: 96 FL (ref 80–97)
MONOCYTES # BLD AUTO: 0.3 10^3/UL (ref 0.1–1.4)
MONOCYTES NFR BLD AUTO: 8.4 % (ref 3–13)
NEUTROPHILS # BLD AUTO: 2.4 10^3/UL (ref 1.7–8.2)
NEUTS SEG NFR BLD AUTO: 75.4 % (ref 42–78)
PLATELET # BLD: 206 10^3/UL (ref 150–450)
POTASSIUM SERPL-SCNC: 3.6 MMOL/L (ref 3.6–5)
RBC # BLD AUTO: 2.61 10^6/UL (ref 3.72–5.28)
TOTAL CELLS COUNTED % (AUTO): 100 %
WBC # BLD AUTO: 3.2 10^3/UL (ref 4–10.5)

## 2020-08-14 RX ADMIN — SODIUM CHLORIDE PRN MLS/HR: 9 INJECTION, SOLUTION INTRAVENOUS at 20:05

## 2020-08-14 RX ADMIN — PANTOPRAZOLE SODIUM SCH MG: 20 TABLET, DELAYED RELEASE ORAL at 05:01

## 2020-08-14 RX ADMIN — OXYBUTYNIN CHLORIDE SCH MG: 5 TABLET ORAL at 05:01

## 2020-08-14 RX ADMIN — SODIUM CHLORIDE PRN MLS/HR: 9 INJECTION, SOLUTION INTRAVENOUS at 00:38

## 2020-08-14 RX ADMIN — ASPIRIN SCH MG: 81 TABLET, COATED ORAL at 09:44

## 2020-08-14 RX ADMIN — HYDROCODONE BITARTRATE AND ACETAMINOPHEN PRN TAB: 10; 325 TABLET ORAL at 20:05

## 2020-08-14 RX ADMIN — FAMOTIDINE SCH MG: 20 TABLET, FILM COATED ORAL at 21:58

## 2020-08-14 RX ADMIN — LEVOTHYROXINE SODIUM SCH MG: 100 TABLET ORAL at 05:01

## 2020-08-14 RX ADMIN — ATENOLOL SCH MG: 50 TABLET ORAL at 09:43

## 2020-08-14 RX ADMIN — FAMOTIDINE SCH MG: 20 TABLET, FILM COATED ORAL at 09:44

## 2020-08-14 RX ADMIN — OXYBUTYNIN CHLORIDE SCH MG: 5 TABLET ORAL at 21:58

## 2020-08-14 RX ADMIN — HYDROCODONE BITARTRATE AND ACETAMINOPHEN PRN TAB: 10; 325 TABLET ORAL at 05:00

## 2020-08-14 RX ADMIN — OXYBUTYNIN CHLORIDE SCH MG: 5 TABLET ORAL at 13:11

## 2020-08-14 RX ADMIN — ENOXAPARIN SODIUM SCH MG: 40 INJECTION SUBCUTANEOUS at 09:44

## 2020-08-14 RX ADMIN — FLUTICASONE FUROATE AND VILANTEROL TRIFENATATE SCH INHALER: 200; 25 POWDER RESPIRATORY (INHALATION) at 09:44

## 2020-08-14 RX ADMIN — SIMVASTATIN SCH MG: 10 TABLET, FILM COATED ORAL at 21:58

## 2020-08-14 RX ADMIN — SERTRALINE HYDROCHLORIDE SCH MG: 50 TABLET ORAL at 09:44

## 2020-08-14 NOTE — RADIOLOGY REPORT (SQ)
EXAM DESCRIPTION:  KUB/ABDOMEN (SINGLE VIEW)



IMAGES COMPLETED DATE/TIME:  8/14/2020 8:43 am



REASON FOR STUDY:  abd pain C20  MALIGNANT NEOPLASM OF RECTUM



COMPARISON:  8/13/2020



NUMBER OF VIEWS:  One view.



TECHNIQUE:   Supine radiographic image of the abdomen acquired.



LIMITATIONS:  None.



FINDINGS:  BOWEL GAS PATTERN: Persistent distention of the majority of the colon.  Sigmoid and rectum
 remain decompressed.

CALCIFICATIONS: Staghorn calculus on the right is again noted.

SOFT TISSUES: No gross mass or suggestion of organomegaly.

HARDWARE: None in the abdomen.

BONES: No acute fracture. No worrisome bone lesions.

OTHER: No other significant finding.



IMPRESSION:  No interval change.



TECHNICAL DOCUMENTATION:  JOB ID:  [id number]

 2011 [employer]- All Rights Reserved



Reading location - IP/workstation name: [email or web address]

## 2020-08-14 NOTE — PDOC PROGRESS REPORT
Subjective


Progress Note for:: 08/14/20


Reason For Visit: 


C20 MALIGNANT NEOPLASM OF RECTUM








Physical Exam


Vital Signs: 


                                        











Temp Pulse Resp BP Pulse Ox


 


 99.1 F   65   18   106/70   91 L


 


 08/13/20 23:24  08/13/20 23:24  08/13/20 23:24  08/13/20 23:24  08/13/20 23:24








                                 Intake & Output











 08/13/20 08/14/20 08/15/20





 06:59 06:59 06:59


 


Intake Total 1500 1970 


 


Output Total 200  


 


Balance 1300 1970 


 


Weight 77.5 kg 77.5 kg 














Results


Laboratory Results: 


                                        





                                 08/14/20 05:00 





                                 08/14/20 05:00 





                                        











  08/14/20 08/14/20





  05:00 05:00


 


WBC  Cancelled 


 


RBC  Cancelled 


 


Hgb  Cancelled 


 


Hct  Cancelled 


 


MCV  Cancelled 


 


MCH  Cancelled 


 


MCHC  Cancelled 


 


RDW  Cancelled 


 


Plt Count  Cancelled 


 


Seg Neutrophils %  Cancelled 


 


Sodium   137.3


 


Potassium   3.6


 


Chloride   113 H


 


Carbon Dioxide   19 L


 


Anion Gap   5


 


BUN   27 H


 


Creatinine   1.32 H


 


Est GFR (African Amer)   49 L


 


Glucose   89


 


Calcium   7.8 L











Impressions: 


                                        





Chest X-Ray  08/11/20 00:00


IMPRESSION:  NO ACUTE RADIOGRAPHIC FINDING IN THE CHEST.


 








Abdomen X-Ray  08/13/20 00:00


IMPRESSION:  Ileus or partial colonic obstruction.


 














Assessment & Plan





- Diagnosis


(1) Ileostomy present


Is this a current diagnosis for this admission?: Yes   





(2) History of rectal cancer


Is this a current diagnosis for this admission?: Yes   





- Time


Anticipated Discharge Disposition: Home, Self Care


Anticipated Discharge Timeframe: within 48 hours





- Plan Summary


Plan Summary: 





64-year-old female status post ileostomy reversal.  She is refusing lab work 

this morning.  She denies nausea or vomiting this morning.  X-rays yesterday 

show a large amount of air in the colon.  She still has not had a bowel 

movement.  Her abdomen is soft and nondistended.  Repeat x-rays today.  I will 

advance her diet, and monitor for any changes.

## 2020-08-15 RX ADMIN — SODIUM CHLORIDE PRN MLS/HR: 9 INJECTION, SOLUTION INTRAVENOUS at 16:11

## 2020-08-15 RX ADMIN — FLUTICASONE FUROATE AND VILANTEROL TRIFENATATE SCH INHALER: 200; 25 POWDER RESPIRATORY (INHALATION) at 09:06

## 2020-08-15 RX ADMIN — PANTOPRAZOLE SODIUM SCH MG: 20 TABLET, DELAYED RELEASE ORAL at 05:08

## 2020-08-15 RX ADMIN — OXYBUTYNIN CHLORIDE SCH MG: 5 TABLET ORAL at 21:21

## 2020-08-15 RX ADMIN — HYDROCODONE BITARTRATE AND ACETAMINOPHEN PRN TAB: 10; 325 TABLET ORAL at 20:24

## 2020-08-15 RX ADMIN — HYDROCODONE BITARTRATE AND ACETAMINOPHEN PRN TAB: 10; 325 TABLET ORAL at 10:12

## 2020-08-15 RX ADMIN — SERTRALINE HYDROCHLORIDE SCH MG: 50 TABLET ORAL at 09:05

## 2020-08-15 RX ADMIN — ASPIRIN SCH MG: 81 TABLET, COATED ORAL at 09:05

## 2020-08-15 RX ADMIN — OXYBUTYNIN CHLORIDE SCH MG: 5 TABLET ORAL at 05:09

## 2020-08-15 RX ADMIN — ENOXAPARIN SODIUM SCH MG: 40 INJECTION SUBCUTANEOUS at 09:05

## 2020-08-15 RX ADMIN — ATENOLOL SCH MG: 50 TABLET ORAL at 09:08

## 2020-08-15 RX ADMIN — LEVOTHYROXINE SODIUM SCH MG: 100 TABLET ORAL at 05:09

## 2020-08-15 RX ADMIN — FAMOTIDINE SCH MG: 20 TABLET, FILM COATED ORAL at 09:05

## 2020-08-15 RX ADMIN — OXYBUTYNIN CHLORIDE SCH MG: 5 TABLET ORAL at 13:08

## 2020-08-15 RX ADMIN — HYDROCODONE BITARTRATE AND ACETAMINOPHEN PRN TAB: 10; 325 TABLET ORAL at 05:08

## 2020-08-15 RX ADMIN — DEXAMETHASONE SODIUM PHOSPHATE PRN MG: 10 INJECTION INTRAMUSCULAR; INTRAVENOUS at 10:25

## 2020-08-15 RX ADMIN — FAMOTIDINE SCH MG: 20 TABLET, FILM COATED ORAL at 21:21

## 2020-08-15 RX ADMIN — SIMVASTATIN SCH MG: 10 TABLET, FILM COATED ORAL at 21:21

## 2020-08-15 NOTE — PDOC PROGRESS REPORT
Subjective


Progress Note for:: 08/15/20


Reason For Visit: 


C20 MALIGNANT NEOPLASM OF RECTUM








Physical Exam


Vital Signs: 


                                        











Temp Pulse Resp BP Pulse Ox


 


 98.5 F   63   18   96/62 L  98 


 


 08/14/20 23:28  08/14/20 23:28  08/14/20 23:28  08/14/20 23:28  08/14/20 23:28








                                 Intake & Output











 08/14/20 08/15/20 08/16/20





 06:59 06:59 06:59


 


Intake Total 1970 973 


 


Balance 1970 973 


 


Weight 77.5 kg 78.4 kg 














Results


Laboratory Results: 


                                        





                                 08/14/20 07:25 





                                 08/14/20 05:00 








Impressions: 


                                        





Chest X-Ray  08/11/20 00:00


IMPRESSION:  NO ACUTE RADIOGRAPHIC FINDING IN THE CHEST.


 








Abdomen X-Ray  08/13/20 00:00


IMPRESSION:  Ileus or partial colonic obstruction.


 








KUB X-Ray  08/14/20 00:00


IMPRESSION:  No interval change.


 














Assessment & Plan





- Diagnosis


(1) Ileostomy present


Is this a current diagnosis for this admission?: Yes   





(2) History of rectal cancer


Is this a current diagnosis for this admission?: Yes   





- Time


Anticipated Discharge Disposition: Home, Self Care


Anticipated Discharge Timeframe: within 48 hours





- Plan Summary


Plan Summary: 





64-year-old female status post ileostomy reversal.  She denies nausea or 

vomiting this morning.   She still has not had a bowel movement.  The patient 

reports that she "feels like she needs to have a bowel movement" but it will not

come out.  She may be experiencing difficulties related to inspissated barium 

from her BE.  I will provide her with a warm tap water enema today, in an effort

to wash the majority of the barium from her sigmoid and descending colon.  Her 

abdomen is soft and nondistended.  Her right lower quadrant incision is intact, 

and without sign of infection.  Continue regular diet.

## 2020-08-16 VITALS — SYSTOLIC BLOOD PRESSURE: 128 MMHG | DIASTOLIC BLOOD PRESSURE: 90 MMHG

## 2020-08-16 RX ADMIN — ENOXAPARIN SODIUM SCH MG: 40 INJECTION SUBCUTANEOUS at 09:02

## 2020-08-16 RX ADMIN — LEVOTHYROXINE SODIUM SCH MG: 100 TABLET ORAL at 05:07

## 2020-08-16 RX ADMIN — FAMOTIDINE SCH MG: 20 TABLET, FILM COATED ORAL at 09:01

## 2020-08-16 RX ADMIN — HYDROCODONE BITARTRATE AND ACETAMINOPHEN PRN TAB: 10; 325 TABLET ORAL at 05:08

## 2020-08-16 RX ADMIN — SERTRALINE HYDROCHLORIDE SCH MG: 50 TABLET ORAL at 09:01

## 2020-08-16 RX ADMIN — ASPIRIN SCH MG: 81 TABLET, COATED ORAL at 09:02

## 2020-08-16 RX ADMIN — OXYBUTYNIN CHLORIDE SCH MG: 5 TABLET ORAL at 05:08

## 2020-08-16 RX ADMIN — FLUTICASONE FUROATE AND VILANTEROL TRIFENATATE SCH INHALER: 200; 25 POWDER RESPIRATORY (INHALATION) at 09:02

## 2020-08-16 RX ADMIN — PANTOPRAZOLE SODIUM SCH MG: 20 TABLET, DELAYED RELEASE ORAL at 05:08

## 2020-08-16 RX ADMIN — ATENOLOL SCH MG: 50 TABLET ORAL at 09:01

## 2020-08-16 NOTE — PDOC DISCHARGE SUMMARY
General





- Admit/Disc Date/PCP


Admission Date/Primary Care Provider: 


  08/11/20 08:00





  ROBBI AGRNERWORTH, KRISTINA-REECE





Discharge Date: 08/16/20





- Discharge Diagnosis


Final Diagnosis: 





Rectal cancer, unwanted ileostomy





- Assessment


Summary: 


65-year-old female admitted for reversal of her unwanted ileostomy.  She has a 

history of rectal cancer, previously treated with low anterior resection and 

diverting ileostomy.  The patient was taken to surgery, where her procedure was 

performed successfully.  She was taken to the floor in stable condition.  The 

patient began to tolerate a full liquid diet.  She reported some flatus after 

surgery, however she subsequently developed an ileus.  Patient had evidence of 

inspissated barium in her colon on x-ray, which was felt to be causing her 

sensation of "constipation".  After a warm tap water enema, to remove the 

barium, she began having flatus and bowel movements.  At this time it is felt 

that she has reached maximal hospital benefit, and is fit for discharge.





- Additional Information


Resuscitation Status: Full Code


Discharge Diet: Regular


Discharge Activity: No Lifting Over 10 Pounds, No Lifting/Push/Pulling


Referrals: 


[facility] [Provider Group] - 08/24/20 8:15 am


Prescriptions: 


Hydrocodone/Acetaminophen [Norco  mg Tablet] 1 tab PO Q4HP PRN #20 tablet


 PRN Reason: For Pain


Home Medications: 








Oxybutynin Chloride [Ditropan 5 mg Tablet] 5 mg PO Q8 #0 03/09/12 


Pravastatin Sodium [Pravachol] 20 mg PO QPM 12/28/16 


Atenolol [Tenormin] 25 mg PO DAILY 01/23/20 


Aspirin [Ecotrin 81 mg EC Tablet] 81 mg PO DAILY 06/16/20 


Diphenhydramine HCl [Benadryl 25 mg Capsule] 25 mg PO BID 06/16/20 


Fluticasone/Salmeterol [Advair 250-50 Diskus 14 Dose/Diskus] 1 puff IH Q12 

06/16/20 


Levothyroxine Sodium [Synthroid 0.1 mg Tablet] 200 mcg PO Q6AM 07/30/20 


Omeprazole Magnesium [Prilosec Otc] 20 mg PO Q6AM 07/30/20 


Albuterol Sulfate [Proair HFA Inhalation Aerosol 8.5 gm MDI] 2 puff IH Q6HP PRN 

08/11/20 


Hydroxyzine Pamoate [Vistaril 25 mg Capsule] 25 mg PO TIDP PRN 08/11/20 


Sertraline HCl 150 mg PO DAILY 08/11/20 


Albuterol Sulfate [Proair HFA Inhalation Aerosol 8.5 gm MDI] 2 puff IH Q6HP PRN 

hfa.aer.ad 08/16/20 


Aspirin [Ecotrin 81 mg EC Tablet] 81 mg PO DAILY  tabec 08/16/20 


Atenolol [Tenormin 50 mg Tablet] 25 mg PO DAILY  tablet 08/16/20 


Hydrocodone/Acetaminophen [Norco  mg Tablet] 1 tab PO Q4HP PRN #20 tablet 

08/16/20 











History of Present Illiness


History of Present Illness: 


SERGEI BAKER is a 65 year old female








Physical Exam


Vital Signs: 


                                        











Temp Pulse Resp BP Pulse Ox


 


 98.3 F   60   21 H  170/99 H  94 


 


 08/16/20 08:00  08/16/20 08:00  08/16/20 08:00  08/16/20 08:00  08/16/20 08:00








                                 Intake & Output











 08/15/20 08/16/20 08/17/20





 06:59 06:59 06:59


 


Intake Total 973 1960 


 


Balance 973 1960 


 


Weight 78.4 kg 78.4 kg 














Results


Laboratory Results: 


                                        











WBC  3.2 10^3/uL (4.0-10.5)  L  08/14/20  07:25    


 


RBC  2.61 10^6/uL (3.72-5.28)  L  08/14/20  07:25    


 


Hgb  8.1 g/dL (12.0-15.5)  L D 08/14/20  07:25    


 


Hct  24.9 % (36.0-47.0)  L  08/14/20  07:25    


 


MCV  96 fl (80-97)   08/14/20  07:25    


 


MCH  31.2 pg (27.0-33.4)   08/14/20  07:25    


 


MCHC  32.7 g/dL (32.0-36.0)   08/14/20  07:25    


 


RDW  15.9 % (11.5-14.0)  H  08/14/20  07:25    


 


Plt Count  206 10^3/uL (150-450)   08/14/20  07:25    


 


Lymph % (Auto)  13.7 % (13-45)   08/14/20  07:25    


 


Mono % (Auto)  8.4 % (3-13)   08/14/20  07:25    


 


Eos % (Auto)  2.0 % (0-6)   08/14/20  07:25    


 


Baso % (Auto)  0.5 % (0-2)   08/14/20  07:25    


 


Absolute Neuts (auto)  2.4 10^3/uL (1.7-8.2)   08/14/20  07:25    


 


Absolute Lymphs (auto)  0.4 10^3/uL (0.5-4.7)  L  08/14/20  07:25    


 


Absolute Monos (auto)  0.3 10^3/uL (0.1-1.4)   08/14/20  07:25    


 


Absolute Eos (auto)  0.1 10^3/uL (0.0-0.6)   08/14/20  07:25    


 


Absolute Basos (auto)  0.0 10^3/uL (0.0-0.2)   08/14/20  07:25    


 


Seg Neutrophils %  75.4 % (42-78)   08/14/20  07:25    


 


Platelet Estimate  Cancelled   08/14/20  05:00    


 


Sodium  137.3 mmol/L (137-145)   08/14/20  05:00    


 


Potassium  3.6 mmol/L (3.6-5.0)   08/14/20  05:00    


 


Chloride  113 mmol/L ()  H  08/14/20  05:00    


 


Carbon Dioxide  19 mmol/L (22-30)  L  08/14/20  05:00    


 


Anion Gap  5  (5-19)   08/14/20  05:00    


 


BUN  27 mg/dL (7-20)  H  08/14/20  05:00    


 


Creatinine  1.32 mg/dL (0.52-1.25)  H  08/14/20  05:00    


 


Est GFR ( Amer)  49  (>60)  L  08/14/20  05:00    


 


Est GFR (MDRD) Non-Af  40  (>60)  L  08/14/20  05:00    


 


Glucose  89 mg/dL ()   08/14/20  05:00    


 


POC Glucose  91 mg/dL ()   08/16/20  07:54    


 


Calcium  7.8 mg/dL (8.4-10.2)  L  08/14/20  05:00    


 


Slides for Path Review  Cancelled   08/14/20  05:00    











Impressions: 


                                        





Chest X-Ray  08/11/20 00:00


IMPRESSION:  NO ACUTE RADIOGRAPHIC FINDING IN THE CHEST.


 








Abdomen X-Ray  08/13/20 00:00


IMPRESSION:  Ileus or partial colonic obstruction.


 








KUB X-Ray  08/14/20 00:00


IMPRESSION:  No interval change.

## 2020-08-18 NOTE — OPERATIVE REPORT
Nonrecallable Operative Report


DATE OF SURGERY: 08/11/20


PREOPERATIVE DIAGNOSIS: History of rectal cancer, unwanted ileostomy


POSTOPERATIVE DIAGNOSIS: Same as above


OPERATION: 1.  Takedown of right lower quadrant ileostomy, with side-to-side 

stapled enteroenteric anastomosis.  2.  Flexible sigmoidoscopy.


SURGEON: REMA DUONG


1ST ASSISTANT: ANN BRYAN


ANESTHESIA: GA


TISSUE REMOVED OR ALTERED: 1.  Small area of hypertrophic appearing tissue at 

anastomosis.  2.  Ileostomy


COMPLICATIONS: 





None apparent


ESTIMATED BLOOD LOSS: Minimal


PROCEDURE: 





Drain/implants: None.





Procedure in detail: After informed consent was obtained, the patient was 

brought to the operating room and laid in the supine position.  The legs were 

"frog legged" and the flexible sigmoidoscopy was undertaken.  The scope was 

inserted into the rectum.  The anastomosis was easily identified.  The scope was

pushed past the anastomosis, to examine the descending colon.  The descending 

colon appeared normal.  The scope was withdrawn past the anastomosis.  The 

anastomosis was widely patent.  There was no evidence of recurrence of her 

cancer.  There was a small amount of granulation tissue at the anastomosis.  

This was sampled via cold biopsy forcep.  The scope was then removed from the 

patient, and this portion of the procedure was concluded.





Attention was then turned to reversal of the ileostomy.  The right lower 

quadrant ileostomy was closed using 0 silk suture in running, locking, "baseball

stitch" fashion.  Once this was completed, the abdomen was prepped and draped in

a normal sterile fashion.  An incision was created around the ileostomy, and 

dissection was carried down to the fascia.  Sharp dissection and electrocautery 

were used to free the ileostomy from the surrounding soft tissues and fascia.  

Once this was completed, there were noted to be a large amount of intra-

abdominal adhesions.  Lysis of intra-abdominal adhesions were undertaken around 

the ileostomy.  Once an adequate amount of length was freed, a side-to-side 

stapled enteroenteric anastomosis was created with the FREDA 75 stapler with blue 

loads.  Next, the resultant defect was also closed using the FREDA stapler.  The 

anastomosis was returned to the abdominal cavity.  The anastomosis appeared to 

lay in good position.





Generally, an absorbable bio mesh is placed into the wound to buttress the 

fascial closure.  Unfortunately due to the large amount of adhesions, a 

significant amount of further surgery would be required in order to fit a mesh 

into the abdominal cavity.  This was felt to be excessively risky in this obese 

patient.  Secondary to this, no bio mesh was used to buttress the fascial 

closure.  The fascia was closed in layers.  The posterior layer was closed using

#1 Vicryl suture in simple running fashion.  The exterior layers were closed 

using #1 Prolene suture in figure-of-eight fashion.  Next a Penrose drain was 

laid within the wound, and the skin was closed over top of the Penrose drain.  

This was done with skin staples.  A dressing was placed, and the procedure was 

then concluded.  All sponge, instrument, and needle counts were correct x2.





Condition: Stable.





Ann Bryan PA-C was scrubbed and present the entirety of the procedure.  

She assisted with all portions of the procedure including opening of the 

abdomen, dissection of the ileostomy.  Creation of the anastomosis, closure of 

the fascia, and closure of the skin.

## 2020-09-09 ENCOUNTER — HOSPITAL ENCOUNTER (INPATIENT)
Dept: HOSPITAL 62 - ER | Age: 65
LOS: 10 days | Discharge: HOME HEALTH | DRG: 371 | End: 2020-09-19
Attending: INTERNAL MEDICINE | Admitting: FAMILY MEDICINE
Payer: MEDICAID

## 2020-09-09 DIAGNOSIS — Z79.51: ICD-10-CM

## 2020-09-09 DIAGNOSIS — N39.0: ICD-10-CM

## 2020-09-09 DIAGNOSIS — K21.9: ICD-10-CM

## 2020-09-09 DIAGNOSIS — E83.51: ICD-10-CM

## 2020-09-09 DIAGNOSIS — D72.829: ICD-10-CM

## 2020-09-09 DIAGNOSIS — Z88.2: ICD-10-CM

## 2020-09-09 DIAGNOSIS — Z90.49: ICD-10-CM

## 2020-09-09 DIAGNOSIS — J44.9: ICD-10-CM

## 2020-09-09 DIAGNOSIS — E89.0: ICD-10-CM

## 2020-09-09 DIAGNOSIS — B95.2: ICD-10-CM

## 2020-09-09 DIAGNOSIS — R65.20: ICD-10-CM

## 2020-09-09 DIAGNOSIS — C19: ICD-10-CM

## 2020-09-09 DIAGNOSIS — E05.80: ICD-10-CM

## 2020-09-09 DIAGNOSIS — I25.10: ICD-10-CM

## 2020-09-09 DIAGNOSIS — E83.42: ICD-10-CM

## 2020-09-09 DIAGNOSIS — Z79.82: ICD-10-CM

## 2020-09-09 DIAGNOSIS — N17.9: ICD-10-CM

## 2020-09-09 DIAGNOSIS — F41.1: ICD-10-CM

## 2020-09-09 DIAGNOSIS — B96.1: ICD-10-CM

## 2020-09-09 DIAGNOSIS — I10: ICD-10-CM

## 2020-09-09 DIAGNOSIS — A41.9: ICD-10-CM

## 2020-09-09 DIAGNOSIS — A04.72: Primary | ICD-10-CM

## 2020-09-09 DIAGNOSIS — Z16.21: ICD-10-CM

## 2020-09-09 DIAGNOSIS — Z91.013: ICD-10-CM

## 2020-09-09 DIAGNOSIS — E87.6: ICD-10-CM

## 2020-09-09 DIAGNOSIS — I25.2: ICD-10-CM

## 2020-09-09 DIAGNOSIS — Z91.041: ICD-10-CM

## 2020-09-09 DIAGNOSIS — Z79.899: ICD-10-CM

## 2020-09-09 LAB
ABSOLUTE LYMPHOCYTES# (MANUAL): 0.7 10^3/UL (ref 0.5–4.7)
ABSOLUTE MONOCYTES # (MANUAL): 0.7 10^3/UL (ref 0.1–1.4)
ADD MANUAL DIFF: YES
ALBUMIN SERPL-MCNC: 2.7 G/DL (ref 3.5–5)
ALP SERPL-CCNC: 82 U/L (ref 38–126)
ANION GAP SERPL CALC-SCNC: 11 MMOL/L (ref 5–19)
AST SERPL-CCNC: 10 U/L (ref 14–36)
BASOPHILS NFR BLD MANUAL: 0 % (ref 0–2)
BILIRUB DIRECT SERPL-MCNC: 0.4 MG/DL (ref 0–0.4)
BILIRUB SERPL-MCNC: 0.4 MG/DL (ref 0.2–1.3)
BUN SERPL-MCNC: 16 MG/DL (ref 7–20)
CALCIUM: 7.1 MG/DL (ref 8.4–10.2)
CHLORIDE SERPL-SCNC: 104 MMOL/L (ref 98–107)
CO2 SERPL-SCNC: 26 MMOL/L (ref 22–30)
EOSINOPHIL NFR BLD MANUAL: 0 % (ref 0–6)
ERYTHROCYTE [DISTWIDTH] IN BLOOD BY AUTOMATED COUNT: 15.2 % (ref 11.5–14)
GLUCOSE SERPL-MCNC: 72 MG/DL (ref 75–110)
HCT VFR BLD CALC: 26.8 % (ref 36–47)
HGB BLD-MCNC: 9 G/DL (ref 12–15.5)
MACROCYTES BLD QL SMEAR: SLIGHT
MCH RBC QN AUTO: 30.5 PG (ref 27–33.4)
MCHC RBC AUTO-ENTMCNC: 33.6 G/DL (ref 32–36)
MCV RBC AUTO: 91 FL (ref 80–97)
MONOCYTES % (MANUAL): 3 % (ref 3–13)
PLATELET # BLD: 341 10^3/UL (ref 150–450)
PLATELET CLUMP BLD QL SMEAR: PRESENT
PLATELET COMMENT: ADEQUATE
POTASSIUM SERPL-SCNC: 2.8 MMOL/L (ref 3.6–5)
PROT SERPL-MCNC: 5.3 G/DL (ref 6.3–8.2)
RBC # BLD AUTO: 2.95 10^6/UL (ref 3.72–5.28)
SEGMENTED NEUTROPHILS % (MAN): 94 % (ref 42–78)
TOTAL CELLS COUNTED BLD: 100
TOXIC GRANULES BLD QL SMEAR: (no result)
VARIANT LYMPHS NFR BLD MANUAL: 3 % (ref 13–45)
WBC # BLD AUTO: 23.3 10^3/UL (ref 4–10.5)
WBC TOXIC VACUOLES BLD QL SMEAR: PRESENT

## 2020-09-09 PROCEDURE — 83605 ASSAY OF LACTIC ACID: CPT

## 2020-09-09 PROCEDURE — 85025 COMPLETE CBC W/AUTO DIFF WBC: CPT

## 2020-09-09 PROCEDURE — 83735 ASSAY OF MAGNESIUM: CPT

## 2020-09-09 PROCEDURE — 84443 ASSAY THYROID STIM HORMONE: CPT

## 2020-09-09 PROCEDURE — 87150 DNA/RNA AMPLIFIED PROBE: CPT

## 2020-09-09 PROCEDURE — 36415 COLL VENOUS BLD VENIPUNCTURE: CPT

## 2020-09-09 PROCEDURE — 96361 HYDRATE IV INFUSION ADD-ON: CPT

## 2020-09-09 PROCEDURE — 85027 COMPLETE CBC AUTOMATED: CPT

## 2020-09-09 PROCEDURE — 87493 C DIFF AMPLIFIED PROBE: CPT

## 2020-09-09 PROCEDURE — 74176 CT ABD & PELVIS W/O CONTRAST: CPT

## 2020-09-09 PROCEDURE — 82962 GLUCOSE BLOOD TEST: CPT

## 2020-09-09 PROCEDURE — 81001 URINALYSIS AUTO W/SCOPE: CPT

## 2020-09-09 PROCEDURE — 80053 COMPREHEN METABOLIC PANEL: CPT

## 2020-09-09 PROCEDURE — S0028 INJECTION, FAMOTIDINE, 20 MG: HCPCS

## 2020-09-09 PROCEDURE — 93005 ELECTROCARDIOGRAM TRACING: CPT

## 2020-09-09 PROCEDURE — 83690 ASSAY OF LIPASE: CPT

## 2020-09-09 PROCEDURE — 87086 URINE CULTURE/COLONY COUNT: CPT

## 2020-09-09 PROCEDURE — 99285 EMERGENCY DEPT VISIT HI MDM: CPT

## 2020-09-09 PROCEDURE — 87077 CULTURE AEROBIC IDENTIFY: CPT

## 2020-09-09 PROCEDURE — 80048 BASIC METABOLIC PNL TOTAL CA: CPT

## 2020-09-09 PROCEDURE — 87186 SC STD MICRODIL/AGAR DIL: CPT

## 2020-09-09 PROCEDURE — 96374 THER/PROPH/DIAG INJ IV PUSH: CPT

## 2020-09-09 PROCEDURE — 87324 CLOSTRIDIUM AG IA: CPT

## 2020-09-09 PROCEDURE — 87040 BLOOD CULTURE FOR BACTERIA: CPT

## 2020-09-09 PROCEDURE — 74022 RADEX COMPL AQT ABD SERIES: CPT

## 2020-09-09 PROCEDURE — 80061 LIPID PANEL: CPT

## 2020-09-09 PROCEDURE — 93010 ELECTROCARDIOGRAM REPORT: CPT

## 2020-09-09 PROCEDURE — 84484 ASSAY OF TROPONIN QUANT: CPT

## 2020-09-09 PROCEDURE — 84481 FREE ASSAY (FT-3): CPT

## 2020-09-09 PROCEDURE — 87088 URINE BACTERIA CULTURE: CPT

## 2020-09-09 PROCEDURE — 87449 NOS EACH ORGANISM AG IA: CPT

## 2020-09-09 RX ADMIN — Medication SCH: at 18:31

## 2020-09-09 RX ADMIN — MAGNESIUM SULFATE IN DEXTROSE SCH MLS/HR: 10 INJECTION, SOLUTION INTRAVENOUS at 23:39

## 2020-09-09 RX ADMIN — MORPHINE SULFATE PRN MG: 10 INJECTION INTRAMUSCULAR; INTRAVENOUS; SUBCUTANEOUS at 23:54

## 2020-09-09 NOTE — ER DOCUMENT REPORT
ED Medical Screen (RME)





- General


Chief Complaint: Post Surgical Pain


Stated Complaint: POST SURGICAL PAIN/UNABLE TO EAT


Time Seen by Provider: 20 11:30


Primary Care Provider: 


ROBBI NOBLE FNP-C [Primary Care Provider] - Follow up as needed


Mode of Arrival: Ambulatory


Information source: Patient


Notes: 





65-year-old female presented to ED for complaint of lower abdomen pain.  She 

states she had rectal repair surgery on .  She states she had cancer 

they removed part of her bowel and then repaired the rectum.  She states she was

going to the bathroom having bowel movements about every 30 minutes and then 

suddenly Monday she had a bowel movement and has not had one since then.  She 

states her lower abdomen is starting to really hurt and she is really concerned 

because she is not having any stools at all.  She states she is able to urinate 

with no problems.  She does have a history of colon cancer with bowel resection 

and repair cerebral palsy depression anxiety high blood pressure in a long time 

ago fractured wrist.  She is a former smoker does not drink or do any illicit 

drugs.  She is here for concerns about not having her bowel movements.  We will 

get blood urine and an acute abdomen.














I have greeted and performed a rapid initial assessment of this patient.  A 

comprehensive ED assessment and evaluation of the patient, analysis of test 

results and completion of medical decision making process will be conducted by 

an additional ED providers.


TRAVEL OUTSIDE OF THE [place] IN LAST 30 DAYS: No





- Related Data


Allergies/Adverse Reactions: 


                                        





shellfish derived Allergy (Severe, Verified 20 09:55)


   Hives


iodine [Iodine] Allergy (Intermediate, Verified 20 09:55)


   Rash


Sulfa (Sulfonamide Antibiotics) Allergy (Intermediate, Verified 20 09:55)


   Hives


Iodinated Contrast Media Adverse Reaction (Intermediate, Verified 20 

09:55)


   Nausea











Past Medical History





- Past Medical History


Cardiac Medical History: Reports: Hx Coronary Artery Disease, Hx Heart Attack - 

2010, Hx Hypertension


   Denies: Hx Atrial Fibrillation, Hx Congestive Heart Failure, Hx 

Hypercholesterolemia, Hx Peripheral Vascular Disease, Hx Pulmonary Embolism, Hx 

Heart Murmur


Pulmonary Medical History: Reports: Hx Asthma, Hx Bronchitis, Hx COPD, Hx 

Pneumonia - 2015, Hx Respiratory Failure


   Denies: Hx Sleep Apnea, Hx Tuberculosis


Neurological Medical History: Denies: Hx Cerebrovascular Accident, Hx Seizures, 

Hx Parkinson's Disease


Endocrine Medical History: Reports: Hx Hyperthyroidism - Treated with 

radioactive iodine, Hx Hypothyroidism - Post ablation, takes thyroid replacement

 hormone.  Denies: Hx Diabetes Mellitus Type 1, Hx Diabetes Mellitus Type 2


Renal/ Medical History: Reports: Hx Renal Insufficiency


Malignancy Medical History: Reports: Hx Colorectal Cancer.  Denies: Hx Leukemia,

 Hx Lung Cancer


GI Medical History: Reports: Hx Gastroesophageal Reflux Disease.  Denies: Hx 

Cirrhosis, Hx Crohn's Disease, Hx Hepatitis, Hx Hiatal Hernia, Hx Irritable 

Bowel, Hx Liver Failure, Hx Pancreatitis, Hx Ulcer, Hx Ulcerative Colitis


Musculoskeltal Medical History: Reports Hx Arthritis, Denies Hx Fibromyalgia, 

Denies Hx Multiple Sclerosis, Denies Hx Muscular Dystrophy, Denies Hx Systemic 

Lupus Erythematosus


Skin Medical History: Denies Hx Eczema, Denies Hx Psoriasis


Psychiatric Medical History: Reports: Hx Depression


   Denies: Hx Bipolar Disorder, Hx Dementia, Hx Post Traumatic Stress Disorder, 

Hx Schizophrenia


Traumatic Medical History: Denies: Hx Fractures


Infectious Medical History: Denies: Hx Hepatitis, Hx HIV


Past Surgical History: Reports: Hx Abdominal Surgery - COLOSTOMY, Hx Ileostomy -

 With Low anterior resection and total colectomy-mesal-rectal, Hx Tubal 

Ligation, Other - Tumor removal of the throat which was benign.  Denies: Hx 

Appendectomy, Hx Bowel Surgery, Hx  Section, Hx Cholecystectomy, Hx 

Colostomy, Hx Coronary Artery Bypass Graft, Hx Gastric Bypass Surgery, Hx H

erniorrhaphy, Hx Hysterectomy, Hx Mastectomy, Hx Open Heart Surgery, Hx 

Pacemaker, Hx Tonsillectomy





- Immunizations


Hx Diphtheria, Pertussis, Tetanus Vaccination: Yes - not sure





Physical Exam





- Vital signs


Vitals: 





                                        











Temp Pulse Resp BP Pulse Ox


 


 98.2 F   77   18   117/70   94 


 


 20 10:57  20 10:57  20 10:57  20 10:57  20 10:57














Course





- Vital Signs


Vital signs: 





                                        











Temp Pulse Resp BP Pulse Ox


 


 98.2 F   77   18   117/70   94 


 


 20 10:57  20 10:57  20 10:57  20 10:57  20 10:57














Doctor's Discharge





- Discharge


Referrals: 


ROBBI NOBLE FNP-C [Primary Care Provider] - Follow up as needed

## 2020-09-09 NOTE — RADIOLOGY REPORT (SQ)
EXAM DESCRIPTION:  ACUTE ABDOMEN SERIES



IMAGES COMPLETED DATE/TIME:  9/9/2020 12:04 pm



REASON FOR STUDY:  Patient states no bowel movement since Monday



COMPARISON:  AP view of the chest from 8/11/2020 and AP view of the abdomen from 8/14/2020.



NUMBER OF VIEWS:  Three views.



TECHNIQUE:  A PA view of the chest and AP supine and upright views of the abdomen were obtained.



LIMITATIONS:  None.



FINDINGS:  CHEST: The cardiomediastinal silhouette and pulmonary vasculature are within normal limits
.  There is re- demonstration of a hiatal hernia.  There is no acute consolidation, pleural effusion 
or pneumothorax.

FREE AIR: None.

BOWEL GAS PATTERN: Absence of the normal haustral folds in the transverse colon.  There are no dilate
d loops of bowel.

CALCIFICATIONS: Staghorn calculus in the right kidney.

HARDWARE: The tip of the right IJ single-lumen port projects within the SVC.  There are tubal ligatio
n clips in the pelvis.

SOFT TISSUES: No abnormality.

BONES: Degenerative spondylosis of the lumbar spine.

OTHER: No other finding.



IMPRESSION:  1.  No acute cardiopulmonary process.

2.  Absence of the normal haustral folds in the transverse colon.  This pattern can be seen in the se
tting of a chronic inflammatory colitis.



TECHNICAL DOCUMENTATION:  JOB ID:  [id number]

 2011 [employer]- All Rights Reserved



Reading location - IP/workstation name: [email or web address]

## 2020-09-09 NOTE — RADIOLOGY REPORT (SQ)
CT ABDOMEN PELVIS WITHOUT IV CONTRAST



HISTORY: Left-sided abdominal pain. Recent ileostomy reversal.



COMPARISON: 4/6/2020



TECHNIQUE: CT scan of the abdomen and pelvis was performed

without IV contrast. This exam was performed according to our

departmental dose-optimization program, which includes automated

exposure control, adjustment of the mA and/or kV according to

patient size and/or use of iterative reconstruction technique.



FINDINGS:



There is mild scarring at the lung bases but without pleural or

pericardial effusions. There is a moderate-sized hiatal hernia.



The gallbladder is distended and contains gallstones. The liver,

spleen, pancreas, and adrenal glands are unremarkable. There are

multiple calcifications in both kidneys including a staghorn

calculi in the right renal pelvis along with chronic right-sided

hydronephrosis and the lower pole with cortical scarring and

calcification. Bilateral tubal ligation clips are seen.



There is marked circumferential wall thickening which involves

the entirety of the colon with surrounding mesenteric

inflammation. No bowel obstruction. There is also small amount of

free fluid but no free air is evident. The small bowel is grossly

unremarkable. Evidence of prior right lower quadrant ostomy and

midline anterior surgical scarring.



There are mild degenerative changes of the spine. Chronic

compression deformity of T12. There is diffuse body wall

anasarca. The aorta is normal in caliber. 



IMPRESSION:



1.  Marked circumferential wall thickening throughout the colon

suggestive of acute colitis, which may be infectious or

inflammatory in origin.

2.  Gallstones without inflammatory changes.

3.  Right-sided staghorn calculi with chronic severe

hydronephrosis.

## 2020-09-09 NOTE — EKG REPORT
SEVERITY:- ABNORMAL ECG -

SINUS RHYTHM

LEFT AXIS DEVIATION

LOW VOLTAGE IN FRONTAL LEADS

BORDERLINE PROLONGED QT INTERVAL

:

Confirmed by: Shahid Burk MD 09-Sep-2020 20:51:52

## 2020-09-09 NOTE — ER DOCUMENT REPORT
ED GI/





- General


Chief Complaint: Lower Abdominal Pain


Stated Complaint: POST SURGICAL PAIN/UNABLE TO EAT


Time Seen by Provider: 20 11:30


Mode of Arrival: Ambulatory


TRAVEL OUTSIDE OF THE [place] IN LAST 30 DAYS: No





- HPI


Patient complains to provider of: Abdominal pain


Onset: Other - since the surgery


Timing/Duration: Intermittent


Quality of pain: Sharp


Associated symptoms: Constipation, Diarrhea.  denies: Blood in stool, Dysuria, 

Hematuria, Urinary frequency


Notes: 


Patient is a 65-year-old female with a past medical history of colon cancer sta

tus post previous resection and ileostomy reversal in August who presents with 

abdominal pain and constipation.  Patient states that she has had abdominal pain

since the surgery.  She states she has had diarrhea every day since then.  She 

has been taking Imodium for the past 6 days.  Today, she has not been able to 

have any bowel movements.  She complains of pain at the left lower quadrant.  

Patient denies any blood in her stool.  She denies any urinary symptoms.  She 

denies any fevers or chills.  She states she is very anxious and always has 

chest pain from that. she is not on chemotherapy.


20 17:32





20 17:44





20 17:46





20 23:53








- Related Data


Allergies/Adverse Reactions: 


                                        





shellfish derived Allergy (Severe, Verified 20 09:55)


   Hives


iodine [Iodine] Allergy (Intermediate, Verified 20 09:55)


   Rash


Sulfa (Sulfonamide Antibiotics) Allergy (Intermediate, Verified 20 09:55)


   Hives


Iodinated Contrast Media Adverse Reaction (Intermediate, Verified 20 

09:55)


   Nausea











Past Medical History





- General


Information source: Patient





- Social History


Smoking Status: Former Smoker


Chew tobacco use (# tins/day): No


Frequency of alcohol use: None


Drug Abuse: None


Family History: CAD, DM, Hyperlipidemia, Hypertension





- Past Medical History


Cardiac Medical History: Reports: Hx Coronary Artery Disease, Hx Heart Attack - 

2010, Hx Hypertension


   Denies: Hx Atrial Fibrillation, Hx Congestive Heart Failure, Hx 

Hypercholesterolemia, Hx Peripheral Vascular Disease, Hx Pulmonary Embolism, Hx 

Heart Murmur


Pulmonary Medical History: Reports: Hx Asthma, Hx Bronchitis, Hx COPD, Hx 

Pneumonia - 2015, Hx Respiratory Failure


   Denies: Hx Sleep Apnea, Hx Tuberculosis


Neurological Medical History: Denies: Hx Cerebrovascular Accident, Hx Seizures, 

Hx Parkinson's Disease


Endocrine Medical History: Reports: Hx Hyperthyroidism - Treated with 

radioactive iodine, Hx Hypothyroidism - Post ablation, takes thyroid replacement

 hormone.  Denies: Hx Diabetes Mellitus Type 1, Hx Diabetes Mellitus Type 2


Renal/ Medical History: Reports: Hx Renal Insufficiency


Malignancy Medical History: Reports: Hx Colorectal Cancer.  Denies: Hx Leukemia,

 Hx Lung Cancer


GI Medical History: Reports: Hx Gastroesophageal Reflux Disease.  Denies: Hx 

Cirrhosis, Hx Crohn's Disease, Hx Hepatitis, Hx Hiatal Hernia, Hx Irritable 

Bowel, Hx Liver Failure, Hx Pancreatitis, Hx Ulcer, Hx Ulcerative Colitis


Musculoskeletal Medical History: Reports Hx Arthritis, Denies Hx Fibromyalgia, 

Denies Hx Multiple Sclerosis, Denies Hx Muscular Dystrophy, Denies Hx Systemic 

Lupus Erythematosus


Skin Medical History: Denies Hx Eczema, Denies Hx Psoriasis


Psychiatric Medical History: Reports: Hx Depression


   Denies: Hx Bipolar Disorder, Hx Dementia, Hx Post Traumatic Stress Disorder, 

Hx Schizophrenia


Traumatic Medical History: Denies: Hx Fractures


Infectious Medical History: Denies: Hx Hepatitis, Hx HIV


Past Surgical History: Reports: Hx Abdominal Surgery - COLOSTOMY, Hx Ileostomy -

 With Low anterior resection and total colectomy-mesal-rectal, Hx Tubal 

Ligation, Other - Tumor removal of the throat which was benign.  Denies: Hx 

Appendectomy, Hx Bowel Surgery, Hx  Section, Hx Cholecystectomy, Hx 

Colostomy, Hx Coronary Artery Bypass Graft, Hx Gastric Bypass Surgery, Hx 

Herniorrhaphy, Hx Hysterectomy, Hx Mastectomy, Hx Open Heart Surgery, Hx 

Pacemaker, Hx Tonsillectomy





- Immunizations


Hx Diphtheria, Pertussis, Tetanus Vaccination: Yes - not sure





Review of Systems





- Review of Systems


Notes: 


CONSTITUTIONAL:  No fever, fatigue or weight loss.  


SKIN:  No rash.  


HENT:  No congestion, ear pain, or sore throat.  


EYES:  No recent vision problems or eye pain.  


ENDOCRINE: No polyuria or polydipsia. 


CARDIOVASCULAR:  Chronic chest pain she attributes to anxiety


RESPIRATORY:  No cough, shortness of breath, congestion, or wheezing.  


GASTROINTESTINAL:  Positive abdominal pain and constipation today.  


GENITOURINARY: No dysuria.  No hematuria.


MUSCULOSKELETAL:  No joint pain or swelling.  


NEUROLOGIC:  No seizures. No headache, focal weakness or sensory changes. 


HEMATOLOGIC:  No unusual bruising or bleeding.  


PSYCHIATRIC:  No depression or anxiety.





Physical Exam





- Vital signs


Vitals: 


                                        











Temp Pulse Resp BP Pulse Ox


 


 98.2 F   77   18   117/70   94 


 


 20 10:57  20 10:57  20 10:57  20 10:57  20 10:57














- Notes


Notes: 





VITAL SIGNS: Within normal limits.


GENERAL:  No acute distress, non-toxic appearance.  


HEAD:  Normal with no signs of head trauma.


EYES:  EOMI, conjunctiva normal, no discharge.  


EARS:  Hearing grossly intact.


NOSE: Normal.


NECK:  Normal range of motion, no tenderness, supple, no JVD.   


CHEST:  Clear breath sounds bilaterally.  No wheezes, rales, or rhonchi.  


CARDIAC:  Regular rate and rhythm.  S1 and S2, without murmurs, gallops, or 

rubs.


VASCULAR:  No Edema.  Peripheral pulses normal and equal in all extremities.


ABDOMEN: Abdomen is soft.  Surgical scars are well-healing.  Tender to palpation

 in the left lower quadrant.


GASTROINTESTINAL: Bowel sounds normal


GENITOURINARY: Normal, No tenderness


MUSCULOSKELETAL:  Good range of motion of all major joints. Extremities without 

clubbing, cyanosis or edema.  


NEUROLOGICAL:  Alert and oriented x 3.  No focal sensory or strength deficits.  

 Speech normal.  Follows commands appropriately.


PSYCHIATRIC:  Normal Affect, judgement and mood.


SKIN:  Normal appearance with no rashes or lesions.





Course





- Re-evaluation


Re-evalutation: 





20 23:54


Patient states she had relief from morphine.  She has significant lab 

abnormalities including leukocytosis, hypokalemia, hypomagnesemia.  Her CT scan 

shows diffuse colitis.  With her leukocytosis, I am concerned that this could be

 infectious.  Patient is afebrile.  I discussed with the hospitalist who 

recommended that we start her on meropenem.  Patient was given magnesium and 

potassium.  She was also given fluids.  Patient did have a slightly low glucose,

 so she had oral glucose gel.  Will be admitted to the hospitalist for further 

care.  She is very agreeable to this.





- Vital Signs


Vital signs: 


                                        











Temp Pulse Resp BP Pulse Ox


 


 98.7 F   77   19   134/82 H  93 


 


 20 19:51  20 10:57  20 20:01  20 20:01  20 20:01














- Laboratory


Result Diagrams: 


                                 20 19:30





                                 20 19:30


Laboratory results interpreted by me: 


                                        











  20





  19:30 19:30 19:30


 


WBC  23.3 H  


 


RBC  2.95 L  


 


Hgb  9.0 L  


 


Hct  26.8 L  


 


RDW  15.2 H  


 


Seg Neuts % (Manual)  94 H  


 


Lymphocytes % (Manual)  3 L  


 


Abs Neuts (Manual)  21.9 H  


 


Potassium   2.8 L* 


 


Est GFR (MDRD) Non-Af   57 L 


 


Glucose   72 L 


 


Calcium   7.1 L 


 


Magnesium    1.0 L*


 


AST   10 L 


 


Total Protein   5.3 L 


 


Albumin   2.7 L 














- EKG Interpretation by Me


EKG shows normal: Sinus rhythm


Rate: Normal


Axis/QRS: Left axis deviation


When compared to previous EKG there are: No significant change


Additional EKG results interpreted by me: 





20 21:35


Sinus rhythm at a rate of 70.  QTc 497.  Left axis deviation.  Artifact present.

  No acute ST changes.  EKG appears similar to previous.





Discharge





- Discharge


Clinical Impression: 


 Colitis, Hypokalemia, Hypomagnesemia





Abdominal pain


Qualifiers:


 Abdominal location: left lower quadrant Qualified Code(s): R10.32 - Left lower 

quadrant pain





Condition: Stable


Disposition: ADMITTED AS INPATIENT


Unit Admitted: Medical Floor

## 2020-09-09 NOTE — PDOC CONSULTATION
Consultation


Consult Date: 20


Provider Consulted: DASH WILLARD


Consult reason:: Colitis with abdominal pain and diarrhea





History of Present Illness


Admission Date/PCP: 


  20 22:21





  DON MORE





History of Present Illness: 


SERGEI BAKER is a 65 year old female, s/p low anterior resection for rectal

cancer with protecting ileostomy on 2020; almost  the patient 

underwent reversal of the ileostomy and since then she has developed a severe 

diarrhea with multiple bouts of stools per day.  This week Monday the patient 

has had only 1 bout of diarrhea and normal stool since then in addition she is 

developed severe abdominal pain and generalized weakness.  CT scan of the 

abdomen pelvis done today reveals the presence of diffuse colitis and her blood 

work is significant for a white blood cell count of 23,000 as well as a low 

potassium of 2.1 and low magnesium 1.0.








Past Medical History


Cardiac Medical History: Reports: Coronary Artery Disease, Myocardial Infarction

- , Hypertension


   Denies: Atrial Fibrillation, Congestive Heart Failure, Hyperlipidema, 

Peripheral Vascular Disease, Pulmonary Embolism, Heart Murmur


Pulmonary Medical History: Reports: Asthma, Bronchitis, Chronic Obstructive 

Pulmonary Disease (COPD), Pneumonia - , Respiratory Failure


   Denies: Sleep Apnea, Tuberculosis


EENT Medical History: 


   Denies: Cataracts, Ears - Hearing aids


Neurological Medical History: 


   Denies: Hemorrhagic CVA, Ischemic CVA, Seizures


Endocrine Medical History: Reports: Hyperthyroidism - Treated with radioactive 

iodine, Hypothyroidism - Post ablation, takes thyroid replacement hormone


   Denies: Diabetes Mellitus Type 1, Diabetes Mellitus Type 2, Obesity


Renal/ Medical History: 


   Denies: Chronic Kidney Disease, Nephrolithiasis


Malignancy Medical History: Reports: Colorectal Cancer


   Denies: Leukemia, Lung Cancer


GI Medical History: Reports: Gastroesophageal Reflux Disease


   Denies: Cirrhosis, Crohn's Disease, Hepatitis, Hiatal Hernia, Peptic Ulcer 

Disease, Ulcerative Colitis


Musculoskeltal Medical History: Reports: Arthritis


   Denies: Fibromyalgia


Skin Medical History: 


   Denies: Eczema, Psoriasis


Psychiatric Medical History: Reports: Depression, Tobacco Dependency


   Denies: Alcohol Dependency, Bipolar Disorder, Dementia, Post Traumatic Stress

Disorder, Substance Abuse


Traumatic Medical History: Reports: None


Hematology: Reports: Anemia - Chronic due to chronic blood loss, Bleeding 

Tendencies


   Denies: Hemophilia, Sickle Cell Disease


Infectious Medical History: Reports: None


   Denies: HIV





Past Surgical History


Past Surgical History: Reports: Ileostomy - With Low anterior resection and 

total colectomy-mesal-rectal, Tubal Ligation, Other - Tumor removal of the 

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


   Denies: Amputation, Appendectomy,  Section, Cholecystectomy, 

Colostomy, Coronary Artery Bypass Graft, Gastric Bypass Surgery, Herniorrhaphy, 

Hysterectomy, Mastectomy, Pacemaker, Tonsillectomy





Social History


Lives with: Family


Smoking Status: Former Smoker


Electronic Cigarette use?: No


Frequency of Alcohol Use: None


Hx Recreational Drug Use: No


Drugs: None


Hx Prescription Drug Abuse: No





- Advance Directive


Resuscitation Status: Full Code





Family History


Family History: CAD, DM, Hyperlipidemia, Hypertension


Parental Family History Reviewed: No


Children Family History Reviewed: No


Sibling(s) Family History Reviewed.: No





Medication/Allergy


Home Medications: 








Oxybutynin Chloride [Ditropan 5 mg Tablet] 5 mg PO Q8 #0 12 


Pravastatin Sodium [Pravachol] 20 mg PO QPM 16 


Atenolol [Tenormin] 25 mg PO DAILY 20 


Aspirin [Ecotrin 81 mg EC Tablet] 81 mg PO DAILY 20 


Diphenhydramine HCl [Benadryl 25 mg Capsule] 25 mg PO BID 20 


Fluticasone/Salmeterol [Advair 250-50 Diskus 14 Dose/Diskus] 1 puff IH Q12 

20 


Levothyroxine Sodium [Synthroid 0.1 mg Tablet] 200 mcg PO Q6AM 20 


Omeprazole Magnesium [Prilosec Otc] 20 mg PO Q6AM 20 


Albuterol Sulfate [Proair HFA Inhalation Aerosol 8.5 gm MDI] 2 puff IH Q6HP PRN 

20 


Hydroxyzine Pamoate [Vistaril 25 mg Capsule] 25 mg PO TIDP PRN 20 


Sertraline HCl 150 mg PO DAILY 20 


Albuterol Sulfate [Proair HFA Inhalation Aerosol 8.5 gm MDI] 2 puff IH Q6HP PRN 

hfa.aer.ad 20 


Aspirin [Ecotrin 81 mg EC Tablet] 81 mg PO DAILY  tabec 20 


Atenolol [Tenormin 50 mg Tablet] 25 mg PO DAILY  tablet 20 


Hydrocodone/Acetaminophen [Norco  mg Tablet] 1 tab PO Q4HP PRN #20 tablet 

20 








Allergies/Adverse Reactions: 


                                        





shellfish derived Allergy (Severe, Verified 20 09:55)


   Hives


iodine [Iodine] Allergy (Intermediate, Verified 20 09:55)


   Rash


Sulfa (Sulfonamide Antibiotics) Allergy (Intermediate, Verified 20 09:55)


   Hives


Iodinated Contrast Media Adverse Reaction (Intermediate, Verified 20 

09:55)


   Nausea











Physical Exam


Vital Signs: 


                                        











Temp Pulse Resp BP Pulse Ox


 


 98.7 F   77   19   134/82 H  93 


 


 20 19:51  20 10:57  20 20:01  20 20:01  20 20:01








                                 Intake & Output











 09/08/20 09/09/20 09/10/20





 06:59 06:59 06:59


 


Weight   68 kg











General appearance: PRESENT: mild distress, thin


Head exam: PRESENT: atraumatic


Eye exam: PRESENT: EOMI


Mouth exam: PRESENT: dry mucosa, neck supple


Teeth exam: PRESENT: poor dentation


Respiratory exam: PRESENT: clear to auscultation susan


Cardiovascular exam: PRESENT: RRR


GI/Abdominal exam: PRESENT: distended, hypoactive bowel sounds, tenderness - 

Diffuse tenderness on palpation


Rectal exam: PRESENT: deferred


Extremities exam: PRESENT: full ROM


Musculoskeletal exam: PRESENT: full ROM


Neurological exam: PRESENT: alert, awake, oriented to person


Psychiatric exam: PRESENT: appropriate affect


Skin exam: PRESENT: warm





Results


Laboratory Results: 


                                        





                                 20 19:30 





                                 20 19:30 





                                        











  20





  19:30 19:30 19:30


 


WBC  23.3 H  


 


RBC  2.95 L  


 


Hgb  9.0 L  


 


Hct  26.8 L  


 


MCV  91  D  


 


MCH  30.5  


 


MCHC  33.6  


 


RDW  15.2 H  


 


Plt Count  341  


 


Seg Neutrophils %  Not Reportable  


 


Sodium   140.6 


 


Potassium   2.8 L* 


 


Chloride   104 


 


Carbon Dioxide   26 


 


Anion Gap   11 


 


BUN   16 


 


Creatinine   0.98 


 


Est GFR ( Amer)   > 60 


 


Glucose   72 L 


 


Lactic Acid   


 


Calcium   7.1 L 


 


Magnesium    1.0 L*


 


Total Bilirubin   0.4 


 


AST   10 L 


 


Alkaline Phosphatase   82 


 


Total Protein   5.3 L 


 


Albumin   2.7 L 


 


Lipase   103.3 














  20





  21:28


 


WBC 


 


RBC 


 


Hgb 


 


Hct 


 


MCV 


 


MCH 


 


MCHC 


 


RDW 


 


Plt Count 


 


Seg Neutrophils % 


 


Sodium 


 


Potassium 


 


Chloride 


 


Carbon Dioxide 


 


Anion Gap 


 


BUN 


 


Creatinine 


 


Est GFR (African Amer) 


 


Glucose 


 


Lactic Acid  0.7


 


Calcium 


 


Magnesium 


 


Total Bilirubin 


 


AST 


 


Alkaline Phosphatase 


 


Total Protein 


 


Albumin 


 


Lipase 








                                        











  20





  19:30


 


Troponin I  < 0.012











Impressions: 


                                        





Acute Abdomen Series  20 11:38


IMPRESSION:  1.  No acute cardiopulmonary process.


2.  Absence of the normal haustral folds in the transverse colon.  This pattern 

can be seen in the setting of a chronic inflammatory colitis.


 








Abdomen/Pelvis CT  20 20:18


IMPRESSION:


 


1.  Marked circumferential wall thickening throughout the colon


suggestive of acute colitis, which may be infectious or


inflammatory in origin.


2.  Gallstones without inflammatory changes.


3.  Right-sided staghorn calculi with chronic severe


hydronephrosis.


 














Assessment & Plan





- Diagnosis


(1) Diversion colitis


Is this a current diagnosis for this admission?: Yes   





- Plan Summary


Plan Summary: 





Assessment:





S/p low anterior resection for rectal cancer and protective ileostomy in 2020


Ileostomy reversal in 2020


Patient suffering from multiple bouts of diarrhea with a CT scan is significant 

for diffuse colitis


White blood cell count elevated 20,000


The clinical picture fits the diagnosis of diversion colitis


In addition, pseudomembranous colitis is a possibility in this patient








Plan:





IV antibiotics as per the hospitalist (meropenem)


And recommended to send the stools for ova and parasites and regular culture as 

well as the white blood cells


N.p.o.


IV hydration


Electrolyte replacement

## 2020-09-10 LAB
ALBUMIN SERPL-MCNC: 2.8 G/DL (ref 3.5–5)
ALP SERPL-CCNC: 85 U/L (ref 38–126)
ANION GAP SERPL CALC-SCNC: 13 MMOL/L (ref 5–19)
APPEARANCE UR: (no result)
APTT PPP: (no result) S
AST SERPL-CCNC: 10 U/L (ref 14–36)
BILIRUB DIRECT SERPL-MCNC: 0.3 MG/DL (ref 0–0.4)
BILIRUB SERPL-MCNC: 0.3 MG/DL (ref 0.2–1.3)
BILIRUB UR QL STRIP: NEGATIVE
BUN SERPL-MCNC: 16 MG/DL (ref 7–20)
CALCIUM: 7.1 MG/DL (ref 8.4–10.2)
CHLORIDE SERPL-SCNC: 107 MMOL/L (ref 98–107)
CHOLEST SERPL-MCNC: 113.56 MG/DL (ref 0–200)
CO2 SERPL-SCNC: 24 MMOL/L (ref 22–30)
ERYTHROCYTE [DISTWIDTH] IN BLOOD BY AUTOMATED COUNT: 15.3 % (ref 11.5–14)
GLUCOSE SERPL-MCNC: 73 MG/DL (ref 75–110)
GLUCOSE UR STRIP-MCNC: NEGATIVE MG/DL
HCT VFR BLD CALC: 28.9 % (ref 36–47)
HGB BLD-MCNC: 9.2 G/DL (ref 12–15.5)
KETONES UR STRIP-MCNC: (no result) MG/DL
LDLC SERPL DIRECT ASSAY-MCNC: 47 MG/DL (ref ?–100)
MCH RBC QN AUTO: 29.4 PG (ref 27–33.4)
MCHC RBC AUTO-ENTMCNC: 32 G/DL (ref 32–36)
MCV RBC AUTO: 92 FL (ref 80–97)
NITRITE UR QL STRIP: POSITIVE
PH UR STRIP: 6 [PH] (ref 5–9)
PLATELET # BLD: 376 10^3/UL (ref 150–450)
POTASSIUM SERPL-SCNC: 3.3 MMOL/L (ref 3.6–5)
PROT SERPL-MCNC: 5.4 G/DL (ref 6.3–8.2)
PROT UR STRIP-MCNC: 30 MG/DL
RBC # BLD AUTO: 3.14 10^6/UL (ref 3.72–5.28)
SP GR UR STRIP: 1.02
T3FREE SERPL-MCNC: 1.94 PG/ML (ref 2.77–5.27)
TRIGL SERPL-MCNC: 147 MG/DL (ref ?–150)
TSH SERPL-ACNC: 0.21 UIU/ML (ref 0.47–4.68)
UROBILINOGEN UR-MCNC: NEGATIVE MG/DL (ref ?–2)
VLDLC SERPL CALC-MCNC: 29 MG/DL (ref 10–31)
WBC # BLD AUTO: 21.4 10^3/UL (ref 4–10.5)

## 2020-09-10 RX ADMIN — HEPARIN SODIUM SCH UNIT: 5000 INJECTION, SOLUTION INTRAVENOUS; SUBCUTANEOUS at 13:58

## 2020-09-10 RX ADMIN — MORPHINE SULFATE PRN MG: 10 INJECTION INTRAMUSCULAR; INTRAVENOUS; SUBCUTANEOUS at 17:09

## 2020-09-10 RX ADMIN — DEXTROSE MONOHYDRATE, SODIUM CHLORIDE, AND POTASSIUM CHLORIDE PRN MLS/HR: 50; 9; 1.49 INJECTION, SOLUTION INTRAVENOUS at 03:48

## 2020-09-10 RX ADMIN — Medication SCH: at 06:00

## 2020-09-10 RX ADMIN — Medication SCH UNIT: at 05:53

## 2020-09-10 RX ADMIN — METRONIDAZOLE SCH MLS/HR: 500 INJECTION, SOLUTION INTRAVENOUS at 17:10

## 2020-09-10 RX ADMIN — FAMOTIDINE SCH MG: 10 INJECTION INTRAVENOUS at 00:04

## 2020-09-10 RX ADMIN — METRONIDAZOLE SCH MLS/HR: 500 INJECTION, SOLUTION INTRAVENOUS at 23:34

## 2020-09-10 RX ADMIN — Medication SCH: at 21:59

## 2020-09-10 RX ADMIN — DEXTROSE MONOHYDRATE, SODIUM CHLORIDE, AND POTASSIUM CHLORIDE PRN MLS/HR: 50; 9; 1.49 INJECTION, SOLUTION INTRAVENOUS at 16:08

## 2020-09-10 RX ADMIN — MAGNESIUM SULFATE IN DEXTROSE SCH MLS/HR: 10 INJECTION, SOLUTION INTRAVENOUS at 02:34

## 2020-09-10 RX ADMIN — DEXAMETHASONE SODIUM PHOSPHATE PRN MG: 10 INJECTION INTRAMUSCULAR; INTRAVENOUS at 21:23

## 2020-09-10 RX ADMIN — Medication SCH ML: at 00:00

## 2020-09-10 RX ADMIN — Medication SCH UNIT: at 21:23

## 2020-09-10 RX ADMIN — LEVOTHYROXINE SODIUM ANHYDROUS SCH MG: 100 INJECTION, POWDER, LYOPHILIZED, FOR SOLUTION INTRAVENOUS at 09:49

## 2020-09-10 RX ADMIN — FAMOTIDINE SCH MG: 10 INJECTION INTRAVENOUS at 21:23

## 2020-09-10 RX ADMIN — METRONIDAZOLE SCH MLS/HR: 500 INJECTION, SOLUTION INTRAVENOUS at 05:54

## 2020-09-10 RX ADMIN — MEROPENEM SCH MLS/HR: 1 INJECTION INTRAVENOUS at 22:03

## 2020-09-10 RX ADMIN — HEPARIN SODIUM SCH UNIT: 5000 INJECTION, SOLUTION INTRAVENOUS; SUBCUTANEOUS at 21:23

## 2020-09-10 RX ADMIN — FAMOTIDINE SCH MG: 10 INJECTION INTRAVENOUS at 09:48

## 2020-09-10 RX ADMIN — Medication SCH UNIT: at 00:48

## 2020-09-10 RX ADMIN — HEPARIN SODIUM SCH UNIT: 5000 INJECTION, SOLUTION INTRAVENOUS; SUBCUTANEOUS at 05:53

## 2020-09-10 RX ADMIN — MORPHINE SULFATE PRN MG: 10 INJECTION INTRAMUSCULAR; INTRAVENOUS; SUBCUTANEOUS at 10:43

## 2020-09-10 RX ADMIN — Medication SCH UNIT: at 13:58

## 2020-09-10 RX ADMIN — METRONIDAZOLE SCH MLS/HR: 500 INJECTION, SOLUTION INTRAVENOUS at 11:30

## 2020-09-10 RX ADMIN — Medication SCH ML: at 13:58

## 2020-09-10 NOTE — PDOC PROGRESS REPORT
Subjective


Progress Note for:: 09/10/20


Subjective:: 





Patient was admitted with abdominal pain she has a prior history of colon cancer

with partial colectomy and ileostomy she has been seen by surgery 

recommendations noted she is currently on meropenem as well as Flagyl.  She is 

growing surgical intervention is planned at this time as colitis is felt to be 

infectious


Reason For Visit: 


ACUTE COLITIS/ILEITIS, LEUKOCYTOSIS, HYPOMAGNESEMI








Physical Exam


Vital Signs: 


                                        











Temp Pulse Resp BP Pulse Ox


 


 98.1 F   67   14   121/73   97 


 


 09/10/20 08:19  09/10/20 09:10  09/10/20 09:10  09/10/20 08:19  09/10/20 09:10








                                 Intake & Output











 09/09/20 09/10/20 09/11/20





 06:59 06:59 06:59


 


Intake Total  1350 1250


 


Balance  1350 1250


 


Weight  67.3 kg 











General appearance: PRESENT: no acute distress


Head exam: PRESENT: atraumatic, normocephalic


Eye exam: PRESENT: conjunctiva pink, EOMI, PERRLA.  ABSENT: scleral icterus


Mouth exam: PRESENT: dry mucosa, tongue midline


Neck exam: ABSENT: carotid bruit, JVD, lymphadenopathy, thyromegaly


Respiratory exam: PRESENT: clear to auscultation susan, unlabored.  ABSENT: rales,

rhonchi, wheezes


Cardiovascular exam: PRESENT: RRR, +S1, +S2.  ABSENT: diastolic murmur, rubs, 

systolic murmur


Pulses: PRESENT: normal dorsalis pedis pul


Vascular exam: PRESENT: normal capillary refill


GI/Abdominal exam: PRESENT: normal bowel sounds, tenderness - Especially in the 

left lower quadrant.  ABSENT: distended, guarding, mass, organolmegaly, rebound


Rectal exam: PRESENT: deferred


Extremities exam: PRESENT: full ROM.  ABSENT: calf tenderness, clubbing, pedal 

edema


Neurological exam: PRESENT: alert, awake, oriented to person, oriented to place,

oriented to time, oriented to situation, CN II-XII grossly intact.  ABSENT: 

motor sensory deficit


Psychiatric exam: PRESENT: appropriate affect, normal mood.  ABSENT: homicidal 

ideation, suicidal ideation


Skin exam: PRESENT: dry, intact, warm.  ABSENT: cyanosis, rash





Results


Laboratory Results: 


                                        





                                 09/10/20 05:40 





                                 09/10/20 05:40 





                                        











  09/09/20 09/09/20 09/09/20





  19:30 19:30 19:30


 


WBC  23.3 H  


 


RBC  2.95 L  


 


Hgb  9.0 L  


 


Hct  26.8 L  


 


MCV  91  D  


 


MCH  30.5  


 


MCHC  33.6  


 


RDW  15.2 H  


 


Plt Count  341  


 


Seg Neutrophils %  Not Reportable  


 


Sodium   140.6 


 


Potassium   2.8 L* 


 


Chloride   104 


 


Carbon Dioxide   26 


 


Anion Gap   11 


 


BUN   16 


 


Creatinine   0.98 


 


Est GFR ( Amer)   > 60 


 


Glucose   72 L 


 


Lactic Acid   


 


Calcium   7.1 L 


 


Magnesium    1.0 L*


 


Total Bilirubin   0.4 


 


AST   10 L 


 


Alkaline Phosphatase   82 


 


Total Protein   5.3 L 


 


Albumin   2.7 L 


 


Triglycerides   


 


Cholesterol   


 


LDL Cholesterol Direct   


 


VLDL Cholesterol   


 


HDL Cholesterol   


 


Lipase   103.3 


 


TSH   


 


Free T3 pg/mL   


 


Urine Color   


 


Urine Appearance   


 


Urine pH   


 


Ur Specific Gravity   


 


Urine Protein   


 


Urine Glucose (UA)   


 


Urine Ketones   


 


Urine Blood   


 


Urine Nitrite   


 


Ur Leukocyte Esterase   


 


Urine WBC (Auto)   


 


Urine RBC (Auto)   














  09/09/20 09/09/20 09/10/20





  21:28 23:40 05:40


 


WBC    21.4 H


 


RBC    3.14 L


 


Hgb    9.2 L


 


Hct    28.9 L


 


MCV    92


 


MCH    29.4


 


MCHC    32.0


 


RDW    15.3 H


 


Plt Count    376


 


Seg Neutrophils %   


 


Sodium   


 


Potassium   


 


Chloride   


 


Carbon Dioxide   


 


Anion Gap   


 


BUN   


 


Creatinine   


 


Est GFR (African Amer)   


 


Glucose   


 


Lactic Acid  0.7  


 


Calcium   


 


Magnesium   


 


Total Bilirubin   


 


AST   


 


Alkaline Phosphatase   


 


Total Protein   


 


Albumin   


 


Triglycerides   


 


Cholesterol   


 


LDL Cholesterol Direct   


 


VLDL Cholesterol   


 


HDL Cholesterol   


 


Lipase   


 


TSH   


 


Free T3 pg/mL   


 


Urine Color   MADHURI 


 


Urine Appearance   CLOUDY 


 


Urine pH   6.0 


 


Ur Specific Gravity   1.020 


 


Urine Protein   30 H 


 


Urine Glucose (UA)   NEGATIVE 


 


Urine Ketones   TRACE H 


 


Urine Blood   NEGATIVE 


 


Urine Nitrite   POSITIVE H 


 


Ur Leukocyte Esterase   SMALL H 


 


Urine WBC (Auto)   69 


 


Urine RBC (Auto)   4 














  09/10/20 09/10/20





  05:40 05:40


 


WBC  


 


RBC  


 


Hgb  


 


Hct  


 


MCV  


 


MCH  


 


MCHC  


 


RDW  


 


Plt Count  


 


Seg Neutrophils %  


 


Sodium  143.7 


 


Potassium  3.3 L 


 


Chloride  107 


 


Carbon Dioxide  24 


 


Anion Gap  13 


 


BUN  16 


 


Creatinine  0.99 


 


Est GFR (African Amer)  > 60 


 


Glucose  73 L 


 


Lactic Acid  


 


Calcium  7.1 L 


 


Magnesium  1.7 


 


Total Bilirubin  0.3 


 


AST  10 L 


 


Alkaline Phosphatase  85 


 


Total Protein  5.4 L 


 


Albumin  2.8 L 


 


Triglycerides  147 


 


Cholesterol  113.56 


 


LDL Cholesterol Direct  47 


 


VLDL Cholesterol  29.0 


 


HDL Cholesterol  31 L 


 


Lipase  


 


TSH   0.21 L


 


Free T3 pg/mL   1.94 L


 


Urine Color  


 


Urine Appearance  


 


Urine pH  


 


Ur Specific Gravity  


 


Urine Protein  


 


Urine Glucose (UA)  


 


Urine Ketones  


 


Urine Blood  


 


Urine Nitrite  


 


Ur Leukocyte Esterase  


 


Urine WBC (Auto)  


 


Urine RBC (Auto)  








                                        











  09/09/20





  19:30


 


Troponin I  < 0.012











Impressions: 


                                        





Acute Abdomen Series  09/09/20 11:38


IMPRESSION:  1.  No acute cardiopulmonary process.


2.  Absence of the normal haustral folds in the transverse colon.  This pattern 

can be seen in the setting of a chronic inflammatory colitis.


 








Abdomen/Pelvis CT  09/09/20 20:18


IMPRESSION:


 


1.  Marked circumferential wall thickening throughout the colon


suggestive of acute colitis, which may be infectious or


inflammatory in origin.


2.  Gallstones without inflammatory changes.


3.  Right-sided staghorn calculi with chronic severe


hydronephrosis.


 














Assessment and Plan





- Diagnosis


(1) Acute colitis


Is this a current diagnosis for this admission?: Yes   








(3) SIRS (systemic inflammatory response syndrome)


Is this a current diagnosis for this admission?: Yes   


Plan: 


With leukocytosis, and acute infection.  Patient was not febrile and she was not

tachycardic on initial presentation








(4) Abdominal pain


Qualifiers: 


   Abdominal location: left lower quadrant   Qualified Code(s): R10.32 - Left 

lower quadrant pain   


Is this a current diagnosis for this admission?: Yes   





(5) History of malignant neoplasm of colon


Is this a current diagnosis for this admission?: Yes   





(6) Hypomagnesemia


Is this a current diagnosis for this admission?: Yes   


Plan: 


Has been replaced








- Plan Summary


Summary: 


Patient will be admitted to the medical floor where she will receive routine 

supportive and symptomatic cares.  A surgical consultation will be obtained with

Dr. Rodriguez who is covering for Dr. Echols.  She will be treated with IV 

meropenem 1 g every 8 hours initially.  Blood cultures and urine cultures will 

be obtained.  Patient will be treated for hypokalemia and hypomagnesemia in the 

emergency room initially with IV repletion and this will be continued after her 

hospitalization has been initiated.  Patient received morphine sulfate 2 to 4 mg

IV every 2 hours as needed for pain.  She will receive IV Ativan 1 mg IV every 4

hours as needed for anxiety or restlessness.  CBCs, metabolic profiles, thyroid 

profiles and additional laboratory and/or radiographic evaluations will be 

obtained as needed.





- Time


Time Spent with patient: 15-24 minutes


Anticipated Discharge Disposition: Home, Self Care


Anticipated Discharge Timeframe: within 72 hours

## 2020-09-10 NOTE — PDOC H&P
History of Present Illness


Admission Date/PCP: 


09/09/2020   22:07 


KRISTINA MORE-REECE


Patient complains of: Abdominal pain


History of Present Illness: 


SERGEI BAKER is a 65 year old female who presents the emergency room with a

1 day history of abdominal pain.  She admits developing abdominal pain in her 

left lower abdomen on 9/8/2020 which has been a constant colicky aching of m

oderate to severe intensity without radiation associated with constipation and 

accompanied by increased generalized anxiety.  She denies other associated or 

accompanying signs and symptoms.  She denies prior similar episodes.  She admits

a history of colon cancer with a partial colectomy and ileostomy earlier this 

year, followed by a later reversal of her ileostomy in August of this year all 

of which was done here by Dr. Echols.  She has not identified any aggravating or

ameliorating factors for her abdominal pain.  In the emergency room she was 

found to have a white count of 23,300 and CT scan of the abdomen showed diffuse 

colitis without evidence of obstruction.  Additionally she was noted to be 

hypokalemic and hypomagnesemic in the ER.  Her electrolytes are in the process 

of being repleted and she will receive her first dose of meropenem in the 

emergency room prior to her admission to the hospitalist service for further 

evaluation and treatment.





Past Medical History


Cardiac Medical History: Reports: Coronary Artery Disease, Myocardial Infarction

- 2010, Hypertension


   Denies: Atrial Fibrillation, Congestive Heart Failure, Hyperlipidema, Per

ipheral Vascular Disease, Pulmonary Embolism, Heart Murmur


Pulmonary Medical History: Reports: Asthma, Bronchitis, Chronic Obstructive 

Pulmonary Disease (COPD), Pneumonia - 2015, Respiratory Failure


   Denies: Sleep Apnea, Tuberculosis


EENT Medical History: 


   Denies: Cataracts, Ears - Hearing aids


Neurological Medical History: 


   Denies: Hemorrhagic CVA, Ischemic CVA, Seizures


Endocrine Medical History: Reports: Hyperthyroidism - Treated with radioactive 

iodine, Hypothyroidism - Post ablation, takes thyroid replacement hormone


   Denies: Diabetes Mellitus Type 1, Diabetes Mellitus Type 2, Obesity


Renal/ Medical History: 


   Denies: Chronic Kidney Disease, Nephrolithiasis


Malignancy Medical History: Reports: Colorectal Cancer


GI Medical History: Reports: Gastroesophageal Reflux Disease


   Denies: Cirrhosis, Crohn's Disease, Hepatitis, Hiatal Hernia, Peptic Ulcer 

Disease, Ulcerative Colitis


Musculoskeltal Medical History: Reports: Arthritis


   Denies: Fibromyalgia


Skin Medical History: 


   Denies: Eczema, Psoriasis


Psychiatric Medical History: Reports: Depression, Tobacco Dependency


   Denies: Alcohol Dependency, Bipolar Disorder, Dementia, Post Traumatic Stress

Disorder, Substance Abuse


Traumatic Medical History: Reports: None


Hematology: Reports: Anemia - Chronic due to chronic blood loss, Bleeding 

Tendencies


Infectious Medical History: Reports: None





Past Surgical History


Past Surgical History: Reports: Ileostomy - With Low anterior resection and 

partial colectomy, Tubal Ligation, Other - Tumor removal of the throat which was

benign





Social History


Information Source: Patient


Lives with: Family


Smoking Status: Former Smoker


Electronic Cigarette use?: No


Frequency of Alcohol Use: None


Hx Recreational Drug Use: No


Drugs: None


Hx Prescription Drug Abuse: No





- Advance Directive


Resuscitation Status: Full Code


Surrogate healthcare decision maker:: 


Luisana Carranza





Family History


Family History: CAD, DM, Hyperlipidemia, Hypertension


Parental Family History Reviewed: Yes


Children Family History Reviewed: No


Sibling(s) Family History Reviewed.: Yes





Medication/Allergy


Home Medications: 








Oxybutynin Chloride [Ditropan 5 mg Tablet] 5 mg PO Q8 #0 03/09/12 


Pravastatin Sodium [Pravachol] 20 mg PO QPM 12/28/16 


Atenolol [Tenormin] 25 mg PO DAILY 01/23/20 


Aspirin [Ecotrin 81 mg EC Tablet] 81 mg PO DAILY 06/16/20 


Diphenhydramine HCl [Benadryl 25 mg Capsule] 25 mg PO BID 06/16/20 


Fluticasone/Salmeterol [Advair 250-50 Diskus 14 Dose/Diskus] 1 puff IH Q12 

06/16/20 


Levothyroxine Sodium [Synthroid 0.1 mg Tablet] 200 mcg PO Q6AM 07/30/20 


Omeprazole Magnesium [Prilosec Otc] 20 mg PO Q6AM 07/30/20 


Albuterol Sulfate [Proair HFA Inhalation Aerosol 8.5 gm MDI] 2 puff IH Q6HP PRN 

08/11/20 


Hydroxyzine Pamoate [Vistaril 25 mg Capsule] 25 mg PO TIDP PRN 08/11/20 


Sertraline HCl 150 mg PO DAILY 08/11/20 


Albuterol Sulfate [Proair HFA Inhalation Aerosol 8.5 gm MDI] 2 puff IH Q6HP PRN 

[email or web address] 08/16/20 


Aspirin [Ecotrin 81 mg EC Tablet] 81 mg PO DAILY  tabec 08/16/20 


Atenolol [Tenormin 50 mg Tablet] 25 mg PO DAILY  tablet 08/16/20 


Hydrocodone/Acetaminophen [Norco  mg Tablet] 1 tab PO Q4HP PRN #20 tablet 

08/16/20 








Allergies/Adverse Reactions: 


                                        





shellfish derived Allergy (Severe, Verified 08/04/20 09:55)


   Hives


iodine [Iodine] Allergy (Intermediate, Verified 08/04/20 09:55)


   Rash


Sulfa (Sulfonamide Antibiotics) Allergy (Intermediate, Verified 08/04/20 09:55)


   Hives


Iodinated Contrast Media Adverse Reaction (Intermediate, Verified 08/04/20 09:55

)


   Nausea











Review of Systems


Constitutional: ABSENT: chills, fever(s)


Eyes: ABSENT: visual disturbances, other - Eye pain


Ears: ABSENT: hearing changes, other - Ear pain


Nose, Mouth, and Throat: ABSENT: headache(s), sore throat


Cardiovascular: PRESENT: chest pain - Chronic due to anxiety.  ABSENT: 

palpitations


Respiratory: ABSENT: cough, dyspnea


Gastrointestinal: PRESENT: as per HPI, abdominal pain, constipation - X24 hours 

per HPI, diarrhea - Chronic due to total colectomy with multiple stools per day.

  ABSENT: bloating, hematemesis, hematochezia, melena, nausea, vomiting


Genitourinary: PRESENT: dysuria.  ABSENT: hematuria


Musculoskeletal: ABSENT: back pain, joint swelling, muscle weakness


Integumentary: ABSENT: pruritus, rash


Neurological: ABSENT: confusion, convulsions, focal weakness, memory loss, 

syncope


Psychiatric: ABSENT: anxiety, depression


Endocrine: ABSENT: cold intolerance, heat intolerance


Hematologic/Lymphatic: ABSENT: easy bleeding, easy bruising


Allergic/Immunologic: ABSENT: seasonal rhinorrhea





Physical Exam


Vital Signs: 


                                        











Temp Pulse Resp BP Pulse Ox


 


 98.7 F   77   19   134/82 H  93 


 


 09/09/20 19:51  09/09/20 10:57  09/09/20 20:01  09/09/20 20:01  09/09/20 20:01








                                 Intake & Output











 09/07/20 09/08/20 09/09/20





 23:59 23:59 23:59


 


Weight   68 kg











General appearance: PRESENT: cooperative, other - Moderate distress due to 

abdominal pain


Head exam: PRESENT: atraumatic, normocephalic


Eye exam: PRESENT: conjunctiva pink.  ABSENT: conjunctival injection, scleral 

icterus


Ear exam: PRESENT: normal external ear exam.  ABSENT: bleeding, drainage


Mouth exam: PRESENT: dry mucosa, neck supple


Neck exam: ABSENT: thyromegaly, tracheal deviation


Respiratory exam: PRESENT: clear to auscultation susan, symmetrical, unlabored


Cardiovascular exam: PRESENT: RRR.  ABSENT: clicks, gallop, rubs


Pulses: PRESENT: normal radial pulses, normal dorsalis pedis pul


Vascular exam: PRESENT: normal capillary refill.  ABSENT: pallor


GI/Abdominal exam: PRESENT: hypoactive bowel sounds, soft, tenderness - Left 

lower quadrant tenderness palpation


Rectal exam: PRESENT: deferred


Extremities exam: ABSENT: joint swelling, pedal edema


Musculoskeletal exam: ABSENT: deformity, dislocation


Neurological exam: PRESENT: alert, oriented to person, oriented to place, 

oriented to time, oriented to situation, CN II-XII grossly intact.  ABSENT: 

motor sensory deficit


Psychiatric exam: PRESENT: anxious, normal mood


Skin exam: PRESENT: dry, intact, warm.  ABSENT: jaundice, rash, urticaria





Results


Laboratory Results: 


                                        





                                 09/09/20 19:30 





                                 09/09/20 19:30 





                                        











  09/09/20 09/09/20 09/09/20





  19:30 19:30 19:30


 


WBC  23.3 H  


 


RBC  2.95 L  


 


Hgb  9.0 L  


 


Hct  26.8 L  


 


MCV  91  D  


 


MCH  30.5  


 


MCHC  33.6  


 


RDW  15.2 H  


 


Plt Count  341  


 


Seg Neutrophils %  Not Reportable  


 


Sodium   140.6 


 


Potassium   2.8 L* 


 


Chloride   104 


 


Carbon Dioxide   26 


 


Anion Gap   11 


 


BUN   16 


 


Creatinine   0.98 


 


Est GFR ( Amer)   > 60 


 


Glucose   72 L 


 


Lactic Acid   


 


Calcium   7.1 L 


 


Magnesium    1.0 L*


 


Total Bilirubin   0.4 


 


AST   10 L 


 


Alkaline Phosphatase   82 


 


Total Protein   5.3 L 


 


Albumin   2.7 L 


 


Lipase   103.3 














  09/09/20





  21:28


 


WBC 


 


RBC 


 


Hgb 


 


Hct 


 


MCV 


 


MCH 


 


MCHC 


 


RDW 


 


Plt Count 


 


Seg Neutrophils % 


 


Sodium 


 


Potassium 


 


Chloride 


 


Carbon Dioxide 


 


Anion Gap 


 


BUN 


 


Creatinine 


 


Est GFR (African Amer) 


 


Glucose 


 


Lactic Acid  0.7


 


Calcium 


 


Magnesium 


 


Total Bilirubin 


 


AST 


 


Alkaline Phosphatase 


 


Total Protein 


 


Albumin 


 


Lipase 








                                        











  09/09/20





  19:30


 


Troponin I  < 0.012











Impressions: 


                                        





Acute Abdomen Series  09/09/20 11:38


IMPRESSION:  1.  No acute cardiopulmonary process.


2.  Absence of the normal haustral folds in the transverse colon.  This pattern 

can be seen in the setting of a chronic inflammatory colitis.


 








Abdomen/Pelvis CT  09/09/20 20:18


IMPRESSION:


 


1.  Marked circumferential wall thickening throughout the colon


suggestive of acute colitis, which may be infectious or


inflammatory in origin.


2.  Gallstones without inflammatory changes.


3.  Right-sided staghorn calculi with chronic severe


hydronephrosis.


 














Assessment and Plan





- Diagnosis


(1) Acute colitis


Is this a current diagnosis for this admission?: Yes   





(2) Abdominal pain


Qualifiers: 


   Abdominal location: left lower quadrant   Qualified Code(s): R10.32 - Left 

lower quadrant pain   


Is this a current diagnosis for this admission?: Yes   





(3) Leukocytosis


Qualifiers: 


   Leukocytosis type: unspecified   Qualified Code(s): D72.829 - Elevated white 

blood cell count, unspecified   


Is this a current diagnosis for this admission?: Yes   





(4) Hypomagnesemia


Is this a current diagnosis for this admission?: Yes   





(5) Hypomagnesemia


Is this a current diagnosis for this admission?: Yes   





(6) Generalized anxiety disorder


Is this a current diagnosis for this admission?: Yes   





(7) Gastroesophageal reflux disease


Qualifiers: 


   Esophagitis presence: with esophagitis   Qualified Code(s): K21.0 - Gastro-

esophageal reflux disease with esophagitis   


Is this a current diagnosis for this admission?: Yes   





(8) Hypothyroidism


Qualifiers: 


   Hypothyroidism type: postablative   Qualified Code(s): E89.0 - Postprocedural

 hypothyroidism   


Is this a current diagnosis for this admission?: Yes   





(9) Hyperlipidemia


Qualifiers: 


   Hyperlipidemia type: unspecified   Qualified Code(s): E78.5 - Hyperlipidemia,

 unspecified   


Is this a current diagnosis for this admission?: Yes   





(10) COPD (chronic obstructive pulmonary disease)


Qualifiers: 


   COPD type: unspecified COPD   Qualified Code(s): J44.9 - Chronic obstructive 

pulmonary disease, unspecified   


Is this a current diagnosis for this admission?: Yes   





(11) Coronary artery disease


Qualifiers: 


   Coronary Disease-Associated Artery/Lesion type: native artery   Native vs. 

transplanted heart: native heart   Associated angina: without angina   Qualified

 Code(s): I25.10 - Atherosclerotic heart disease of native coronary artery 

without angina pectoris   


Is this a current diagnosis for this admission?: Yes   





(12) History of malignant neoplasm of colon


Is this a current diagnosis for this admission?: Yes   





- Plan Summary


Summary: 


Patient will be admitted to the medical floor where she will receive routine 

supportive and symptomatic cares.  A surgical consultation will be obtained with

 Dr. Rodriguez who is covering for Dr. Echols.  She will be treated with IV 

meropenem 1 g every 8 hours initially.  Blood cultures and urine cultures will 

be obtained.  Patient will be treated for hypokalemia and hypomagnesemia in the 

emergency room initially with IV repletion and this will be continued after her 

hospitalization has been initiated.  Patient received morphine sulfate 2 to 4 mg

 IV every 2 hours as needed for pain.  She will receive IV Ativan 1 mg IV every 

4 hours as needed for anxiety or restlessness.  CBCs, metabolic profiles, 

thyroid profiles and additional laboratory and/or radiographic evaluations will 

be obtained as needed.





- Time


Time Spent with patient: 15-24 minutes


Medications reviewed and adjusted accordingly: Yes


Anticipated Discharge Disposition: Home with Home Health


Anticipated Discharge Timeframe: Undetermined





- Inpatient Certification


Based on my medical assessment, after consideration of the patient's 

comorbidities, presenting symptoms, or acuity I expect that the services needed 

warrant INPATIENT care.: Yes


I certify that my determination is in accordance with my understanding of 

Medicare's requirements for reasonable and necessary INPATIENT services [42 CFR 

412.3e].: Yes


Medical Necessity: Significant Comorbidiites Make Outpatient Treatment Too 

Risky, Need Close Monitoring Due to Risk of Patient Decompensation, Need For IV 

Fluids, Need for Pain Control, Need for IV Antibiotics, Risk of Complication if 

Not Cared For in Hospital

## 2020-09-10 NOTE — PDOC PROGRESS REPORT
Subjective


Progress Note for:: 09/10/20


Subjective:: 





65-year-old female who is recently status post ileostomy reversal.  She presents

with abdominal pain.  She was found to have colitis.  She has been receiving 

intravenous fluids and antibiotics.  Currently she reports mild abdominal pain. 

She has not had a bowel movement in 2 to 3 days.  She reports that she is hungry

and would like to eat.  She continues to pass flatus.


Reason For Visit: 


ACUTE COLITIS/ILEITIS, LEUKOCYTOSIS, HYPOMAGNESEMI








Physical Exam


Vital Signs: 


                                        











Temp Pulse Resp BP Pulse Ox


 


 98.1 F   67   14   121/73   97 


 


 09/10/20 08:19  09/10/20 09:10  09/10/20 09:10  09/10/20 08:19  09/10/20 09:10








                                 Intake & Output











 09/09/20 09/10/20 09/11/20





 06:59 06:59 06:59


 


Intake Total  1350 1250


 


Balance  1350 1250


 


Weight  67.3 kg 











General appearance: PRESENT: no acute distress, cooperative, disheveled


Head exam: PRESENT: atraumatic, normocephalic


Eye exam: PRESENT: EOMI, PERRLA.  ABSENT: scleral icterus


Mouth exam: PRESENT: neck supple


Teeth exam: PRESENT: poor dentation


Neck exam: ABSENT: meningismus, tenderness, thyromegaly, tracheal deviation


Respiratory exam: PRESENT: unlabored.  ABSENT: tachypnea, wheezes


Cardiovascular exam: ABSENT: tachycardia


GI/Abdominal exam: PRESENT: soft, tenderness - Mild tenderness in all 4 

quadrants..  ABSENT: distended, rebound, rigid


Rectal exam: PRESENT: deferred


Extremities exam: ABSENT: clubbing


Musculoskeletal exam: ABSENT: deformity


Neurological exam: PRESENT: alert, awake, oriented to person, oriented to place,

oriented to time, oriented to situation, CN II-XII grossly intact


Psychiatric exam: ABSENT: agitated, anxious, depressed


Focused psych exam: ABSENT: delusional


Skin exam: ABSENT: cyanosis, erythema, jaundice





Results


Laboratory Results: 


                                        





                                 09/10/20 05:40 





                                 09/10/20 05:40 





                                        











  09/09/20 09/09/20 09/09/20





  19:30 19:30 19:30


 


WBC  23.3 H  


 


RBC  2.95 L  


 


Hgb  9.0 L  


 


Hct  26.8 L  


 


MCV  91  D  


 


MCH  30.5  


 


MCHC  33.6  


 


RDW  15.2 H  


 


Plt Count  341  


 


Seg Neutrophils %  Not Reportable  


 


Sodium   140.6 


 


Potassium   2.8 L* 


 


Chloride   104 


 


Carbon Dioxide   26 


 


Anion Gap   11 


 


BUN   16 


 


Creatinine   0.98 


 


Est GFR ( Amer)   > 60 


 


Glucose   72 L 


 


Lactic Acid   


 


Calcium   7.1 L 


 


Magnesium    1.0 L*


 


Total Bilirubin   0.4 


 


AST   10 L 


 


Alkaline Phosphatase   82 


 


Total Protein   5.3 L 


 


Albumin   2.7 L 


 


Triglycerides   


 


Cholesterol   


 


LDL Cholesterol Direct   


 


VLDL Cholesterol   


 


HDL Cholesterol   


 


Lipase   103.3 


 


TSH   


 


Free T3 pg/mL   


 


Urine Color   


 


Urine Appearance   


 


Urine pH   


 


Ur Specific Gravity   


 


Urine Protein   


 


Urine Glucose (UA)   


 


Urine Ketones   


 


Urine Blood   


 


Urine Nitrite   


 


Ur Leukocyte Esterase   


 


Urine WBC (Auto)   


 


Urine RBC (Auto)   














  09/09/20 09/09/20 09/10/20





  21:28 23:40 05:40


 


WBC    21.4 H


 


RBC    3.14 L


 


Hgb    9.2 L


 


Hct    28.9 L


 


MCV    92


 


MCH    29.4


 


MCHC    32.0


 


RDW    15.3 H


 


Plt Count    376


 


Seg Neutrophils %   


 


Sodium   


 


Potassium   


 


Chloride   


 


Carbon Dioxide   


 


Anion Gap   


 


BUN   


 


Creatinine   


 


Est GFR (African Amer)   


 


Glucose   


 


Lactic Acid  0.7  


 


Calcium   


 


Magnesium   


 


Total Bilirubin   


 


AST   


 


Alkaline Phosphatase   


 


Total Protein   


 


Albumin   


 


Triglycerides   


 


Cholesterol   


 


LDL Cholesterol Direct   


 


VLDL Cholesterol   


 


HDL Cholesterol   


 


Lipase   


 


TSH   


 


Free T3 pg/mL   


 


Urine Color   MADHURI 


 


Urine Appearance   CLOUDY 


 


Urine pH   6.0 


 


Ur Specific Gravity   1.020 


 


Urine Protein   30 H 


 


Urine Glucose (UA)   NEGATIVE 


 


Urine Ketones   TRACE H 


 


Urine Blood   NEGATIVE 


 


Urine Nitrite   POSITIVE H 


 


Ur Leukocyte Esterase   SMALL H 


 


Urine WBC (Auto)   69 


 


Urine RBC (Auto)   4 














  09/10/20 09/10/20





  05:40 05:40


 


WBC  


 


RBC  


 


Hgb  


 


Hct  


 


MCV  


 


MCH  


 


MCHC  


 


RDW  


 


Plt Count  


 


Seg Neutrophils %  


 


Sodium  143.7 


 


Potassium  3.3 L 


 


Chloride  107 


 


Carbon Dioxide  24 


 


Anion Gap  13 


 


BUN  16 


 


Creatinine  0.99 


 


Est GFR (African Amer)  > 60 


 


Glucose  73 L 


 


Lactic Acid  


 


Calcium  7.1 L 


 


Magnesium  1.7 


 


Total Bilirubin  0.3 


 


AST  10 L 


 


Alkaline Phosphatase  85 


 


Total Protein  5.4 L 


 


Albumin  2.8 L 


 


Triglycerides  147 


 


Cholesterol  113.56 


 


LDL Cholesterol Direct  47 


 


VLDL Cholesterol  29.0 


 


HDL Cholesterol  31 L 


 


Lipase  


 


TSH   0.21 L


 


Free T3 pg/mL   1.94 L


 


Urine Color  


 


Urine Appearance  


 


Urine pH  


 


Ur Specific Gravity  


 


Urine Protein  


 


Urine Glucose (UA)  


 


Urine Ketones  


 


Urine Blood  


 


Urine Nitrite  


 


Ur Leukocyte Esterase  


 


Urine WBC (Auto)  


 


Urine RBC (Auto)  








                                        











  09/09/20





  19:30


 


Troponin I  < 0.012











Impressions: 


                                        





Acute Abdomen Series  09/09/20 11:38


IMPRESSION:  1.  No acute cardiopulmonary process.


2.  Absence of the normal haustral folds in the transverse colon.  This pattern 

can be seen in the setting of a chronic inflammatory colitis.


 








Abdomen/Pelvis CT  09/09/20 20:18


IMPRESSION:


 


1.  Marked circumferential wall thickening throughout the colon


suggestive of acute colitis, which may be infectious or


inflammatory in origin.


2.  Gallstones without inflammatory changes.


3.  Right-sided staghorn calculi with chronic severe


hydronephrosis.


 














Assessment & Plan





- Diagnosis


(1) Acute colitis


Is this a current diagnosis for this admission?: Yes   





- Time


Anticipated Discharge Disposition: Home, Self Care


Anticipated Discharge Timeframe: unknown





- Plan Summary


Plan Summary: 





65-year-old female with colitis.  The etiology is likely infectious.  Continue 

with IV antibiotics (including Flagyl to cover C. difficile).  She reports 

continuing to pass flatus.  She denies any nausea today.  I will allow her to 

have clear liquids and popsicles.  Out of bed, ambulate in hallways.  Aggressive

pulmonary toilet.  No surgical intervention is planned at this time.  Surgery 

will continue to follow closely with you.

## 2020-09-11 LAB
ABSOLUTE LYMPHOCYTES# (MANUAL): 0.5 10^3/UL (ref 0.5–4.7)
ABSOLUTE MONOCYTES # (MANUAL): 0 10^3/UL (ref 0.1–1.4)
ADD MANUAL DIFF: YES
ANION GAP SERPL CALC-SCNC: 8 MMOL/L (ref 5–19)
ANISOCYTOSIS BLD QL SMEAR: SLIGHT
BASOPHILS NFR BLD MANUAL: 0 % (ref 0–2)
BUN SERPL-MCNC: 11 MG/DL (ref 7–20)
C DIFFICILE GDH: POSITIVE
CALCIUM: 7 MG/DL (ref 8.4–10.2)
CHLORIDE SERPL-SCNC: 113 MMOL/L (ref 98–107)
CO2 SERPL-SCNC: 21 MMOL/L (ref 22–30)
EOSINOPHIL NFR BLD MANUAL: 1 % (ref 0–6)
ERYTHROCYTE [DISTWIDTH] IN BLOOD BY AUTOMATED COUNT: 15.4 % (ref 11.5–14)
GLUCOSE SERPL-MCNC: 101 MG/DL (ref 75–110)
HCT VFR BLD CALC: 26.7 % (ref 36–47)
HGB BLD-MCNC: 8.7 G/DL (ref 12–15.5)
MCH RBC QN AUTO: 30 PG (ref 27–33.4)
MCHC RBC AUTO-ENTMCNC: 32.7 G/DL (ref 32–36)
MCV RBC AUTO: 92 FL (ref 80–97)
MONOCYTES % (MANUAL): 0 % (ref 3–13)
PLATELET # BLD: 359 10^3/UL (ref 150–450)
PLATELET CLUMP BLD QL SMEAR: PRESENT
PLATELET COMMENT: ADEQUATE
PLATELET LARGE: PRESENT
POTASSIUM SERPL-SCNC: 3.2 MMOL/L (ref 3.6–5)
RBC # BLD AUTO: 2.91 10^6/UL (ref 3.72–5.28)
RBC MORPH BLD: (no result)
SEGMENTED NEUTROPHILS % (MAN): 96 % (ref 42–78)
TOTAL CELLS COUNTED BLD: 100
VARIANT LYMPHS NFR BLD MANUAL: 3 % (ref 13–45)
WBC # BLD AUTO: 17.4 10^3/UL (ref 4–10.5)

## 2020-09-11 RX ADMIN — POTASSIUM CHLORIDE SCH MLS/HR: 29.8 INJECTION, SOLUTION INTRAVENOUS at 15:49

## 2020-09-11 RX ADMIN — METRONIDAZOLE SCH MLS/HR: 500 INJECTION, SOLUTION INTRAVENOUS at 17:48

## 2020-09-11 RX ADMIN — Medication SCH UNIT: at 21:42

## 2020-09-11 RX ADMIN — Medication SCH MG: at 23:40

## 2020-09-11 RX ADMIN — METRONIDAZOLE SCH MLS/HR: 500 INJECTION, SOLUTION INTRAVENOUS at 05:53

## 2020-09-11 RX ADMIN — Medication SCH MG: at 17:49

## 2020-09-11 RX ADMIN — DEXTROSE MONOHYDRATE, SODIUM CHLORIDE, AND POTASSIUM CHLORIDE PRN MLS/HR: 50; 9; 1.49 INJECTION, SOLUTION INTRAVENOUS at 01:19

## 2020-09-11 RX ADMIN — DEXTROSE MONOHYDRATE, SODIUM CHLORIDE, AND POTASSIUM CHLORIDE PRN MLS/HR: 50; 9; 1.49 INJECTION, SOLUTION INTRAVENOUS at 10:17

## 2020-09-11 RX ADMIN — MORPHINE SULFATE PRN MG: 10 INJECTION INTRAMUSCULAR; INTRAVENOUS; SUBCUTANEOUS at 05:53

## 2020-09-11 RX ADMIN — FAMOTIDINE SCH MG: 10 INJECTION INTRAVENOUS at 21:41

## 2020-09-11 RX ADMIN — Medication SCH: at 21:43

## 2020-09-11 RX ADMIN — Medication SCH: at 13:29

## 2020-09-11 RX ADMIN — MEROPENEM SCH MLS/HR: 1 INJECTION INTRAVENOUS at 13:27

## 2020-09-11 RX ADMIN — Medication SCH MG: at 14:24

## 2020-09-11 RX ADMIN — DEXTROSE MONOHYDRATE, SODIUM CHLORIDE, AND POTASSIUM CHLORIDE PRN MLS/HR: 50; 9; 1.49 INJECTION, SOLUTION INTRAVENOUS at 21:43

## 2020-09-11 RX ADMIN — HEPARIN SODIUM SCH UNIT: 5000 INJECTION, SOLUTION INTRAVENOUS; SUBCUTANEOUS at 05:54

## 2020-09-11 RX ADMIN — HEPARIN SODIUM SCH UNIT: 5000 INJECTION, SOLUTION INTRAVENOUS; SUBCUTANEOUS at 21:42

## 2020-09-11 RX ADMIN — MEROPENEM SCH MLS/HR: 1 INJECTION INTRAVENOUS at 21:42

## 2020-09-11 RX ADMIN — MORPHINE SULFATE PRN MG: 10 INJECTION INTRAMUSCULAR; INTRAVENOUS; SUBCUTANEOUS at 21:49

## 2020-09-11 RX ADMIN — Medication SCH UNIT: at 05:49

## 2020-09-11 RX ADMIN — LEVOTHYROXINE SODIUM ANHYDROUS SCH MG: 100 INJECTION, POWDER, LYOPHILIZED, FOR SOLUTION INTRAVENOUS at 10:01

## 2020-09-11 RX ADMIN — HEPARIN SODIUM SCH UNIT: 5000 INJECTION, SOLUTION INTRAVENOUS; SUBCUTANEOUS at 13:23

## 2020-09-11 RX ADMIN — POTASSIUM CHLORIDE SCH MLS/HR: 29.8 INJECTION, SOLUTION INTRAVENOUS at 17:48

## 2020-09-11 RX ADMIN — Medication SCH: at 05:54

## 2020-09-11 RX ADMIN — MEROPENEM SCH MLS/HR: 1 INJECTION INTRAVENOUS at 06:45

## 2020-09-11 RX ADMIN — FAMOTIDINE SCH MG: 10 INJECTION INTRAVENOUS at 10:01

## 2020-09-11 RX ADMIN — MORPHINE SULFATE PRN MG: 10 INJECTION INTRAMUSCULAR; INTRAVENOUS; SUBCUTANEOUS at 13:22

## 2020-09-11 RX ADMIN — DEXAMETHASONE SODIUM PHOSPHATE PRN MG: 10 INJECTION INTRAMUSCULAR; INTRAVENOUS at 10:01

## 2020-09-11 RX ADMIN — Medication SCH UNIT: at 13:22

## 2020-09-11 RX ADMIN — MORPHINE SULFATE PRN MG: 10 INJECTION INTRAMUSCULAR; INTRAVENOUS; SUBCUTANEOUS at 00:19

## 2020-09-11 RX ADMIN — METRONIDAZOLE SCH MLS/HR: 500 INJECTION, SOLUTION INTRAVENOUS at 12:00

## 2020-09-11 RX ADMIN — METRONIDAZOLE SCH MLS/HR: 500 INJECTION, SOLUTION INTRAVENOUS at 23:41

## 2020-09-11 NOTE — PDOC PROGRESS REPORT
Subjective


Progress Note for:: 09/11/20


Subjective:: 





Patient was admitted with abdominal pain she has a prior history of colon cancer

with partial colectomy and ileostomy she has been seen by surgery 

recommendations noted she is currently on meropenem as well as Flagyl.  She is 

growing surgical intervention is planned at this time as colitis is felt to be 

infectious


Reason For Visit: 


ACUTE COLITIS/ILEITIS, LEUKOCYTOSIS, HYPOMAGNESEMI








Physical Exam


Vital Signs: 


                                        











Temp Pulse Resp BP Pulse Ox


 


 98.0 F   78   20   144/80 H  100 


 


 09/11/20 11:04  09/11/20 11:04  09/11/20 11:04  09/11/20 11:04  09/11/20 11:04








                                 Intake & Output











 09/10/20 09/11/20 09/12/20





 06:59 06:59 06:59


 


Intake Total 1350 3546 1000


 


Output Total  200 


 


Balance 1350 3346 1000


 


Weight 67.3 kg 71.8 kg 











General appearance: PRESENT: no acute distress, well-developed, well-nourished


Head exam: PRESENT: atraumatic, normocephalic


Eye exam: PRESENT: conjunctiva pink, EOMI, PERRLA.  ABSENT: scleral icterus


Ear exam: PRESENT: normal external ear exam


Mouth exam: PRESENT: moist, tongue midline


Neck exam: ABSENT: carotid bruit, JVD, lymphadenopathy, thyromegaly


Respiratory exam: PRESENT: clear to auscultation susan.  ABSENT: rales, rhonchi, 

wheezes


Cardiovascular exam: PRESENT: RRR.  ABSENT: diastolic murmur, rubs, systolic 

murmur


Pulses: PRESENT: normal dorsalis pedis pul


Vascular exam: PRESENT: normal capillary refill


GI/Abdominal exam: PRESENT: distended, normal bowel sounds, tenderness.  ABSENT:

guarding, mass, organolmegaly, rebound


Rectal exam: PRESENT: deferred


Extremities exam: PRESENT: full ROM.  ABSENT: calf tenderness, clubbing, pedal 

edema


Neurological exam: PRESENT: alert, awake, oriented to person, oriented to place,

oriented to time, oriented to situation, CN II-XII grossly intact.  ABSENT: 

motor sensory deficit


Psychiatric exam: PRESENT: appropriate affect, normal mood.  ABSENT: homicidal 

ideation, suicidal ideation


Skin exam: PRESENT: dry, intact, warm.  ABSENT: cyanosis, rash





Results


Laboratory Results: 


                                        





                                 09/10/20 05:40 





                                 09/10/20 05:40 





                                        











  09/11/20





  08:25


 


Stl C.difficile Tox PCR  POSITIVE








                                        





09/09/20 21:51   Blood   Blood Culture (PCR) - Final


                            Enterococcus Species





                                        











  09/09/20





  19:30


 


Troponin I  < 0.012











Impressions: 


                                        





Acute Abdomen Series  09/09/20 11:38


IMPRESSION:  1.  No acute cardiopulmonary process.


2.  Absence of the normal haustral folds in the transverse colon.  This pattern 

can be seen in the setting of a chronic inflammatory colitis.


 








Abdomen/Pelvis CT  09/09/20 20:18


IMPRESSION:


 


1.  Marked circumferential wall thickening throughout the colon


suggestive of acute colitis, which may be infectious or


inflammatory in origin.


2.  Gallstones without inflammatory changes.


3.  Right-sided staghorn calculi with chronic severe


hydronephrosis.


 














Assessment and Plan





- Diagnosis


(1) Acute colitis


Is this a current diagnosis for this admission?: Yes   








(3) SIRS (systemic inflammatory response syndrome)


Is this a current diagnosis for this admission?: Yes   





(4) Abdominal pain


Qualifiers: 


   Abdominal location: left lower quadrant   Qualified Code(s): R10.32 - Left 

lower quadrant pain   


Is this a current diagnosis for this admission?: Yes   





(5) History of malignant neoplasm of colon


Is this a current diagnosis for this admission?: Yes   





(6) Hypomagnesemia


Is this a current diagnosis for this admission?: Yes   





(7) Clostridioides difficile infection


Is this a current diagnosis for this admission?: Yes   





- Plan Summary


Summary: 


Patient's TSH noted to be 0.2 with a free T3 of 1.9 T4 level has been obtained 

she is on Synthroid 0.05 mg IV daily at this point.  Patient seems to be hyper 

thyroid may be iatrogenic.  I will decrease her Synthroid dose and will 

definitely recheck to ensure that this is a accurate reading.


Patient also has a complicated urinary tract infection secondary to Klebsiella. 

She has bacteremia secondary to enterococcus.  The gram-positive cocci in chains

is likely enterococcus.  She is on meropenem as well as Flagyl IV and added 

vancomycin p.o. as she is now positive for C. difficile





- Time


Time Spent with patient: 15-24 minutes


Anticipated Discharge Disposition: Home with Home Health


Anticipated Discharge Timeframe: within 72 hours

## 2020-09-11 NOTE — PDOC PROGRESS REPORT
Subjective


Progress Note for:: 09/11/20


Subjective:: 





65-year-old female who is recently status post ileostomy reversal.  She presents

with abdominal pain.  She was found to have colitis.  She has been receiving 

intravenous fluids and antibiotics.  She continues to report mild abdominal 

pain.  She had several loose BM's last night after taking stool softeners.  She 

reports one episode of vomiting last night. 


Reason For Visit: 


ACUTE COLITIS/ILEITIS, LEUKOCYTOSIS, HYPOMAGNESEMI








Physical Exam


Vital Signs: 


                                        











Temp Pulse Resp BP Pulse Ox


 


 98.3 F   90   20   140/83 H  95 


 


 09/10/20 23:40  09/10/20 23:40  09/10/20 23:40  09/10/20 23:40  09/10/20 23:40








                                 Intake & Output











 09/10/20 09/11/20 09/12/20





 06:59 06:59 06:59


 


Intake Total 1350 3546 


 


Output Total  200 


 


Balance 1350 3346 


 


Weight 67.3 kg 71.8 kg 











Exam: 





General appearance: PRESENT: no acute distress, cooperative, disheveled


Head exam: PRESENT: atraumatic, normocephalic


Eye exam: PRESENT: EOMI, PERRLA.  ABSENT: scleral icterus


Mouth exam: PRESENT: neck supple


Teeth exam: PRESENT: poor dentation


Neck exam: ABSENT: meningismus, tenderness, thyromegaly, tracheal deviation


Respiratory exam: PRESENT: unlabored.  ABSENT: tachypnea, wheezes


Cardiovascular exam: ABSENT: tachycardia


GI/Abdominal exam: PRESENT: soft, tenderness - Mild tenderness in all 4 

quadrants..  ABSENT: distended, rebound, rigid


Rectal exam: PRESENT: deferred


Extremities exam: ABSENT: clubbing


Musculoskeletal exam: ABSENT: deformity


Neurological exam: PRESENT: alert, awake, oriented to person, oriented to place,

oriented to time, oriented to situation, CN II-XII grossly intact


Psychiatric exam: ABSENT: agitated, anxious, depressed


Focused psych exam: ABSENT: delusional


Skin exam: ABSENT: cyanosis, erythema, jaundice





Results


Laboratory Results: 


                                        





                                 09/10/20 05:40 





                                 09/10/20 05:40 





                                        











  09/10/20 09/10/20





  05:40 05:40


 


WBC  21.4 H 


 


RBC  3.14 L 


 


Hgb  9.2 L 


 


Hct  28.9 L 


 


MCV  92 


 


MCH  29.4 


 


MCHC  32.0 


 


RDW  15.3 H 


 


Plt Count  376 


 


TSH   0.21 L


 


Free T3 pg/mL   1.94 L








                                        











  09/09/20





  19:30


 


Troponin I  < 0.012











Impressions: 


                                        





Acute Abdomen Series  09/09/20 11:38


IMPRESSION:  1.  No acute cardiopulmonary process.


2.  Absence of the normal haustral folds in the transverse colon.  This pattern 

can be seen in the setting of a chronic inflammatory colitis.


 








Abdomen/Pelvis CT  09/09/20 20:18


IMPRESSION:


 


1.  Marked circumferential wall thickening throughout the colon


suggestive of acute colitis, which may be infectious or


inflammatory in origin.


2.  Gallstones without inflammatory changes.


3.  Right-sided staghorn calculi with chronic severe


hydronephrosis.


 














Assessment & Plan





- Diagnosis


(1) Acute colitis


Is this a current diagnosis for this admission?: Yes   





- Time


Anticipated Discharge Disposition: Home, Self Care


Anticipated Discharge Timeframe: unknown





- Plan Summary


Plan Summary: 





65-year-old female with colitis.  The etiology is likely infectious.  Continue 

with IV antibiotics (including Flagyl to cover C. difficile).  She reports 

passing flatus and having BM's.  She reports one episode of nausea last night. 

Cont clear liquids and popsicles.  Out of bed, ambulate in hallways.  Aggressive

pulmonary toilet.  No surgical intervention is planned at this time.  Test stool

for c. diff. Add fiber supplement to thicken stools.  Surgery will continue to 

follow with you.

## 2020-09-12 LAB
ABSOLUTE LYMPHOCYTES# (MANUAL): 1.1 10^3/UL (ref 0.5–4.7)
ABSOLUTE MONOCYTES # (MANUAL): 0.8 10^3/UL (ref 0.1–1.4)
ADD MANUAL DIFF: YES
ANION GAP SERPL CALC-SCNC: 6 MMOL/L (ref 5–19)
ANISOCYTOSIS BLD QL SMEAR: SLIGHT
BASOPHILS NFR BLD MANUAL: 0 % (ref 0–2)
BUN SERPL-MCNC: 7 MG/DL (ref 7–20)
CALCIUM: 6.6 MG/DL (ref 8.4–10.2)
CHLORIDE SERPL-SCNC: 118 MMOL/L (ref 98–107)
CO2 SERPL-SCNC: 19 MMOL/L (ref 22–30)
DACRYOCYTES BLD QL SMEAR: SLIGHT
EOSINOPHIL NFR BLD MANUAL: 1 % (ref 0–6)
ERYTHROCYTE [DISTWIDTH] IN BLOOD BY AUTOMATED COUNT: 15.7 % (ref 11.5–14)
GLUCOSE SERPL-MCNC: 84 MG/DL (ref 75–110)
HCT VFR BLD CALC: 30.7 % (ref 36–47)
HGB BLD-MCNC: 9.9 G/DL (ref 12–15.5)
MCH RBC QN AUTO: 29.6 PG (ref 27–33.4)
MCHC RBC AUTO-ENTMCNC: 32.3 G/DL (ref 32–36)
MCV RBC AUTO: 92 FL (ref 80–97)
MONOCYTES % (MANUAL): 6 % (ref 3–13)
NEUTS BAND NFR BLD MANUAL: 2 % (ref 3–5)
OVALOCYTES BLD QL SMEAR: SLIGHT
PLATELET # BLD: 402 10^3/UL (ref 150–450)
PLATELET COMMENT: ADEQUATE
POTASSIUM SERPL-SCNC: 3.6 MMOL/L (ref 3.6–5)
RBC # BLD AUTO: 3.34 10^6/UL (ref 3.72–5.28)
SEGMENTED NEUTROPHILS % (MAN): 83 % (ref 42–78)
TOTAL CELLS COUNTED BLD: 100
VARIANT LYMPHS NFR BLD MANUAL: 8 % (ref 13–45)
WBC # BLD AUTO: 13.9 10^3/UL (ref 4–10.5)

## 2020-09-12 RX ADMIN — Medication SCH: at 21:13

## 2020-09-12 RX ADMIN — Medication SCH: at 14:43

## 2020-09-12 RX ADMIN — MORPHINE SULFATE PRN MG: 10 INJECTION INTRAMUSCULAR; INTRAVENOUS; SUBCUTANEOUS at 18:30

## 2020-09-12 RX ADMIN — Medication SCH: at 15:48

## 2020-09-12 RX ADMIN — FAMOTIDINE SCH MG: 10 INJECTION INTRAVENOUS at 21:33

## 2020-09-12 RX ADMIN — MORPHINE SULFATE PRN MG: 10 INJECTION INTRAMUSCULAR; INTRAVENOUS; SUBCUTANEOUS at 06:50

## 2020-09-12 RX ADMIN — CHOLESTYRAMINE SCH: 4 POWDER, FOR SUSPENSION ORAL at 17:05

## 2020-09-12 RX ADMIN — DEXTROSE MONOHYDRATE, SODIUM CHLORIDE, AND POTASSIUM CHLORIDE PRN MLS/HR: 50; 9; 1.49 INJECTION, SOLUTION INTRAVENOUS at 17:16

## 2020-09-12 RX ADMIN — LEVOTHYROXINE SODIUM ANHYDROUS SCH MG: 100 INJECTION, POWDER, LYOPHILIZED, FOR SOLUTION INTRAVENOUS at 10:22

## 2020-09-12 RX ADMIN — Medication SCH MG: at 11:46

## 2020-09-12 RX ADMIN — MORPHINE SULFATE PRN MG: 10 INJECTION INTRAMUSCULAR; INTRAVENOUS; SUBCUTANEOUS at 10:21

## 2020-09-12 RX ADMIN — Medication SCH MG: at 06:53

## 2020-09-12 RX ADMIN — LINEZOLID SCH MLS/HR: 600 INJECTION, SOLUTION INTRAVENOUS at 23:46

## 2020-09-12 RX ADMIN — MEROPENEM SCH MLS/HR: 1 INJECTION INTRAVENOUS at 07:49

## 2020-09-12 RX ADMIN — FAMOTIDINE SCH MG: 10 INJECTION INTRAVENOUS at 09:50

## 2020-09-12 RX ADMIN — Medication SCH: at 06:53

## 2020-09-12 RX ADMIN — METRONIDAZOLE SCH MLS/HR: 500 INJECTION, SOLUTION INTRAVENOUS at 23:46

## 2020-09-12 RX ADMIN — METRONIDAZOLE SCH MLS/HR: 500 INJECTION, SOLUTION INTRAVENOUS at 11:47

## 2020-09-12 RX ADMIN — HEPARIN SODIUM SCH UNIT: 5000 INJECTION, SOLUTION INTRAVENOUS; SUBCUTANEOUS at 06:50

## 2020-09-12 RX ADMIN — HEPARIN SODIUM SCH UNIT: 5000 INJECTION, SOLUTION INTRAVENOUS; SUBCUTANEOUS at 21:33

## 2020-09-12 RX ADMIN — METRONIDAZOLE SCH MLS/HR: 500 INJECTION, SOLUTION INTRAVENOUS at 17:16

## 2020-09-12 RX ADMIN — DEXAMETHASONE SODIUM PHOSPHATE PRN MG: 10 INJECTION INTRAMUSCULAR; INTRAVENOUS at 17:04

## 2020-09-12 RX ADMIN — Medication SCH UNIT: at 06:51

## 2020-09-12 RX ADMIN — METRONIDAZOLE SCH MLS/HR: 500 INJECTION, SOLUTION INTRAVENOUS at 06:52

## 2020-09-12 RX ADMIN — CEFTRIAXONE SCH MLS/HR: 1 INJECTION, SOLUTION INTRAVENOUS at 14:46

## 2020-09-12 RX ADMIN — HEPARIN SODIUM SCH UNIT: 5000 INJECTION, SOLUTION INTRAVENOUS; SUBCUTANEOUS at 14:46

## 2020-09-12 RX ADMIN — LINEZOLID SCH MLS/HR: 600 INJECTION, SOLUTION INTRAVENOUS at 15:58

## 2020-09-12 RX ADMIN — MORPHINE SULFATE PRN MG: 10 INJECTION INTRAMUSCULAR; INTRAVENOUS; SUBCUTANEOUS at 14:51

## 2020-09-12 RX ADMIN — Medication SCH MG: at 18:35

## 2020-09-12 RX ADMIN — Medication SCH MG: at 23:46

## 2020-09-12 RX ADMIN — DEXTROSE MONOHYDRATE, SODIUM CHLORIDE, AND POTASSIUM CHLORIDE PRN MLS/HR: 50; 9; 1.49 INJECTION, SOLUTION INTRAVENOUS at 06:54

## 2020-09-12 NOTE — PDOC PROGRESS REPORT
Subjective


Progress Note for:: 09/12/20


Subjective:: 





no Complaints


Reason For Visit: 


ACUTE COLITIS/ILEITIS, LEUKOCYTOSIS, HYPOMAGNESEMI








Physical Exam


Vital Signs: 


                                        











Temp Pulse Resp BP Pulse Ox


 


 98.0 F   89   16   122/85   96 


 


 09/12/20 07:57  09/12/20 07:48  09/12/20 07:48  09/12/20 07:48  09/12/20 07:48








                                 Intake & Output











 09/11/20 09/12/20 09/13/20





 06:59 06:59 06:59


 


Intake Total 3646 4675 100


 


Output Total 200  


 


Balance 3446 4675 100


 


Weight 71.8 kg 71.8 kg 











General appearance: PRESENT: no acute distress


GI/Abdominal exam: PRESENT: soft





Results


Laboratory Results: 


                                        





                                 09/12/20 07:30 





                                 09/12/20 06:30 





                                        











  09/11/20 09/11/20 09/11/20





  08:25 12:38 12:38


 


WBC   17.4 H 


 


RBC   2.91 L 


 


Hgb   8.7 L 


 


Hct   26.7 L 


 


MCV   92 


 


MCH   30.0 


 


MCHC   32.7 


 


RDW   15.4 H 


 


Plt Count   359 


 


Seg Neutrophils %   Not Reportable 


 


Sodium    142.1


 


Potassium    3.2 L


 


Chloride    113 H


 


Carbon Dioxide    21 L


 


Anion Gap    8


 


BUN    11


 


Creatinine    0.86


 


Est GFR ( Amer)    > 60


 


Glucose    101


 


Calcium    7.0 L*


 


Stl C.difficile Tox PCR  POSITIVE  














  09/12/20 09/12/20 09/12/20





  06:30 06:30 07:30


 


WBC  Cancelled   13.9 H


 


RBC  Cancelled   3.34 L


 


Hgb  Cancelled   9.9 L


 


Hct  Cancelled   30.7 L


 


MCV  Cancelled   92


 


MCH  Cancelled   29.6


 


MCHC  Cancelled   32.3


 


RDW  Cancelled   15.7 H


 


Plt Count  Cancelled   402


 


Seg Neutrophils %  Cancelled   Not Reportable


 


Sodium   143.4 


 


Potassium   3.6 


 


Chloride   118 H 


 


Carbon Dioxide   19 L 


 


Anion Gap   6 


 


BUN   7 


 


Creatinine   0.77 


 


Est GFR ( Amer)   > 60 


 


Glucose   84 


 


Calcium   6.6 L* 


 


Stl C.difficile Tox PCR   








                                        





09/09/20 21:28   Blood   Blood Culture - Final


                            E.faecium Vre


09/09/20 21:51   Blood   Blood Culture (PCR) - Final


                            Enterococcus Species


09/09/20 21:51   Blood   Blood Culture - Final


                            E.faecium Vre


09/09/20 23:40   Clean Catch Midstream   Urine Culture - Final


                            Klebsiella Oxytoca


                            Proteus Mirabilis





                                        











  09/09/20





  19:30


 


Troponin I  < 0.012











Impressions: 


                                        





Acute Abdomen Series  09/09/20 11:38


IMPRESSION:  1.  No acute cardiopulmonary process.


2.  Absence of the normal haustral folds in the transverse colon.  This pattern 

can be seen in the setting of a chronic inflammatory colitis.


 








Abdomen/Pelvis CT  09/09/20 20:18


IMPRESSION:


 


1.  Marked circumferential wall thickening throughout the colon


suggestive of acute colitis, which may be infectious or


inflammatory in origin.


2.  Gallstones without inflammatory changes.


3.  Right-sided staghorn calculi with chronic severe


hydronephrosis.


 














Assessment & Plan





- Diagnosis


(1) Clostridioides difficile infection


Is this a current diagnosis for this admission?: Yes   





- Time


Anticipated Discharge Disposition: Home, Self Care


Anticipated Discharge Timeframe: When the infection is cleared





- Plan Summary


Plan Summary: 





Assessment:





C. difficile colitis


Patient treated with IV Flagyl and oral vancomycin


Patient still presented with diarrhea


Abdomen soft





Plan:





Continue current antibiotic treatment


I am recommending to add yogurt 1 cup by mouth twice a day to improve the 

bacterial clotilde of the colon


We will follow daily as needed

## 2020-09-12 NOTE — PDOC PROGRESS REPORT
Subjective


Progress Note for:: 09/12/20


Subjective:: 





Patient was admitted with abdominal pain she has a prior history of colon cancer

with partial colectomy and ileostomy she has been seen by surgery 

recommendations noted she is currently on meropenem as well as Flagyl.  

Antibiotics will be adjusted today


Patient feels okay this morning.  She still complaining of abdominal pain.  She 

is complaining of diarrhea.


Reason For Visit: 


ACUTE COLITIS/ILEITIS, LEUKOCYTOSIS, HYPOMAGNESEMI








Physical Exam


Vital Signs: 


                                        











Temp Pulse Resp BP Pulse Ox


 


 98.0 F   70   12   123/70   95 


 


 09/12/20 11:52  09/12/20 11:52  09/12/20 11:52  09/12/20 11:52  09/12/20 11:52








                                 Intake & Output











 09/11/20 09/12/20 09/13/20





 06:59 06:59 06:59


 


Intake Total 3646 4675 205


 


Output Total 200  


 


Balance 3446 4675 205


 


Weight 71.8 kg 71.8 kg 











General appearance: PRESENT: no acute distress


Head exam: PRESENT: atraumatic, normocephalic


Eye exam: PRESENT: conjunctiva pink, EOMI, PERRLA.  ABSENT: scleral icterus


Mouth exam: PRESENT: moist, tongue midline


Neck exam: ABSENT: carotid bruit, JVD, lymphadenopathy, thyromegaly


Respiratory exam: PRESENT: clear to auscultation susan.  ABSENT: rales, rhonchi, 

wheezes


Cardiovascular exam: PRESENT: RRR, +S1, +S2.  ABSENT: diastolic murmur, rubs, 

systolic murmur


Pulses: PRESENT: normal dorsalis pedis pul


Vascular exam: PRESENT: normal capillary refill


GI/Abdominal exam: PRESENT: normal bowel sounds, tenderness - LLQ.  ABSENT: 

distended, guarding, mass, organolmegaly, rebound


Rectal exam: PRESENT: deferred


Extremities exam: PRESENT: full ROM.  ABSENT: calf tenderness, clubbing, pedal 

edema


Neurological exam: PRESENT: alert, awake, oriented to person, oriented to place,

oriented to time, oriented to situation, CN II-XII grossly intact.  ABSENT: 

motor sensory deficit


Psychiatric exam: PRESENT: appropriate affect, normal mood.  ABSENT: homicidal 

ideation, suicidal ideation


Skin exam: PRESENT: dry, intact, warm.  ABSENT: cyanosis, rash





Results


Laboratory Results: 


                                        





                                 09/12/20 07:30 





                                 09/12/20 06:30 





                                        











  09/11/20 09/11/20 09/12/20





  12:38 12:38 06:30


 


WBC  17.4 H   Cancelled


 


RBC  2.91 L   Cancelled


 


Hgb  8.7 L   Cancelled


 


Hct  26.7 L   Cancelled


 


MCV  92   Cancelled


 


MCH  30.0   Cancelled


 


MCHC  32.7   Cancelled


 


RDW  15.4 H   Cancelled


 


Plt Count  359   Cancelled


 


Seg Neutrophils %  Not Reportable   Cancelled


 


Sodium   142.1 


 


Potassium   3.2 L 


 


Chloride   113 H 


 


Carbon Dioxide   21 L 


 


Anion Gap   8 


 


BUN   11 


 


Creatinine   0.86 


 


Est GFR ( Amer)   > 60 


 


Glucose   101 


 


Calcium   7.0 L* 














  09/12/20 09/12/20





  06:30 07:30


 


WBC   13.9 H


 


RBC   3.34 L


 


Hgb   9.9 L


 


Hct   30.7 L


 


MCV   92


 


MCH   29.6


 


MCHC   32.3


 


RDW   15.7 H


 


Plt Count   402


 


Seg Neutrophils %   Not Reportable


 


Sodium  143.4 


 


Potassium  3.6 


 


Chloride  118 H 


 


Carbon Dioxide  19 L 


 


Anion Gap  6 


 


BUN  7 


 


Creatinine  0.77 


 


Est GFR (African Amer)  > 60 


 


Glucose  84 


 


Calcium  6.6 L* 








                                        





09/09/20 21:28   Blood   Blood Culture - Final


                            E.faecium Vre


09/09/20 21:51   Blood   Blood Culture (PCR) - Final


                            Enterococcus Species


09/09/20 21:51   Blood   Blood Culture - Final


                            E.faecium Vre


09/09/20 23:40   Clean Catch Midstream   Urine Culture - Final


                            Klebsiella Oxytoca


                            Proteus Mirabilis





                                        











  09/09/20





  19:30


 


Troponin I  < 0.012











Impressions: 


                                        





Acute Abdomen Series  09/09/20 11:38


IMPRESSION:  1.  No acute cardiopulmonary process.


2.  Absence of the normal haustral folds in the transverse colon.  This pattern 

can be seen in the setting of a chronic inflammatory colitis.


 








Abdomen/Pelvis CT  09/09/20 20:18


IMPRESSION:


 


1.  Marked circumferential wall thickening throughout the colon


suggestive of acute colitis, which may be infectious or


inflammatory in origin.


2.  Gallstones without inflammatory changes.


3.  Right-sided staghorn calculi with chronic severe


hydronephrosis.


 














Assessment and Plan





- Diagnosis


(1) Acute colitis


Is this a current diagnosis for this admission?: Yes   





(2) Hypokalemia


Is this a current diagnosis for this admission?: Yes   


Plan: 


Replaced








(3) SIRS (systemic inflammatory response syndrome)


Is this a current diagnosis for this admission?: Yes   





(4) Abdominal pain


Qualifiers: 


   Abdominal location: left lower quadrant   Qualified Code(s): R10.32 - Left 

lower quadrant pain   


Is this a current diagnosis for this admission?: Yes   





(5) History of malignant neoplasm of colon


Is this a current diagnosis for this admission?: Yes   





(6) Hypomagnesemia


Is this a current diagnosis for this admission?: Yes   





(7) Clostridioides difficile infection


Is this a current diagnosis for this admission?: Yes   


Plan: 


Continue with oral vancomycin and add Questran for diarrhea








(8) Bacteremia due to vancomycin resistant Enterococcus


Is this a current diagnosis for this admission?: Yes   


Plan: 


Precise source of VRE bacteremia is unclear although likely from the bowels.  

She did have his CAT scan done on presentation with findings suggestive of acute

colitis which may be infectious or inflammatory in origin.  I would suggest the 

need for a colonoscopy once patient is over this acute episode to rule out any 

underlying etiology.  And apparently has a prior history of malignant neoplasm 

of the colon








(9) Hypocalcemia


Is this a current diagnosis for this admission?: Yes   


Plan: 


Adjusted calcium level is about 7.8.  I did replace her calcium today.








- Plan Summary


Summary: 


Patient's TSH noted to be 0.2 with a free T3 of 1.9 T4 level has been obtained 

she is on Synthroid 0.05 mg IV daily at this point.  Patient seems to be hyper 

thyroid may be iatrogenic.  I will decrease her Synthroid dose and will 

definitely recheck to ensure that this is a accurate reading.


Patient also has a complicated urinary tract infection secondary to Klebsiella. 

She has bacteremia secondary to enterococcus.  The gram-positive cocci in chains

is likely enterococcus.  She is on meropenem as well as Flagyl IV and added 

vancomycin p.o. as she is now positive for C. difficile





9/12   She is currently being treated for C. difficile colitis, complicated as 

well as a complicated UTI.  Urine culture is yielding Klebsiella as well as 

Proteus and blood cultures currently rule yielding enterococcus, vancomycin-

resistant.  Patient is currently on meropenem Flagyl and vancomycin orally.  At 

this point I see no indication for the meropenem and as ceftriaxone will be 

adequate for the Klebsiella and Proteus he likely is sufficient to place 

ceftriaxone which is a narrow spectrum.  As for the metronidazole I will leave 

that for now but that probably can be discontinued also at some point.  For the 

VRE bacteremia antibiotic





- Time


Time Spent with patient: 15-24 minutes


Anticipated Discharge Disposition: Home, Self Care


Anticipated Discharge Timeframe: within 72 hours

## 2020-09-13 LAB
ABSOLUTE LYMPHOCYTES# (MANUAL): 0.8 10^3/UL (ref 0.5–4.7)
ABSOLUTE MONOCYTES # (MANUAL): 1.2 10^3/UL (ref 0.1–1.4)
ADD MANUAL DIFF: YES
ANION GAP SERPL CALC-SCNC: 8 MMOL/L (ref 5–19)
ANISOCYTOSIS BLD QL SMEAR: SLIGHT
BASOPHILS NFR BLD MANUAL: 0 % (ref 0–2)
BUN SERPL-MCNC: 5 MG/DL (ref 7–20)
CALCIUM: 7.4 MG/DL (ref 8.4–10.2)
CHLORIDE SERPL-SCNC: 113 MMOL/L (ref 98–107)
CO2 SERPL-SCNC: 19 MMOL/L (ref 22–30)
EOSINOPHIL NFR BLD MANUAL: 0 % (ref 0–6)
ERYTHROCYTE [DISTWIDTH] IN BLOOD BY AUTOMATED COUNT: 15.7 % (ref 11.5–14)
GLUCOSE SERPL-MCNC: 78 MG/DL (ref 75–110)
HCT VFR BLD CALC: 25.5 % (ref 36–47)
HGB BLD-MCNC: 8.5 G/DL (ref 12–15.5)
MCH RBC QN AUTO: 30.5 PG (ref 27–33.4)
MCHC RBC AUTO-ENTMCNC: 33.3 G/DL (ref 32–36)
MCV RBC AUTO: 92 FL (ref 80–97)
MONOCYTES % (MANUAL): 14 % (ref 3–13)
NEUTS BAND NFR BLD MANUAL: 1 % (ref 3–5)
OVALOCYTES BLD QL SMEAR: SLIGHT
PLATELET # BLD: 343 10^3/UL (ref 150–450)
PLATELET COMMENT: ADEQUATE
POLYCHROMASIA BLD QL SMEAR: SLIGHT
POTASSIUM SERPL-SCNC: 3.5 MMOL/L (ref 3.6–5)
RBC # BLD AUTO: 2.78 10^6/UL (ref 3.72–5.28)
SCHISTOCYTES BLD QL SMEAR: SLIGHT
SEGMENTED NEUTROPHILS % (MAN): 76 % (ref 42–78)
TOTAL CELLS COUNTED BLD: 100
VARIANT LYMPHS NFR BLD MANUAL: 9 % (ref 13–45)
WBC # BLD AUTO: 8.4 10^3/UL (ref 4–10.5)

## 2020-09-13 RX ADMIN — CEFTRIAXONE SCH MLS/HR: 1 INJECTION, SOLUTION INTRAVENOUS at 11:07

## 2020-09-13 RX ADMIN — MORPHINE SULFATE PRN MG: 10 INJECTION INTRAMUSCULAR; INTRAVENOUS; SUBCUTANEOUS at 21:35

## 2020-09-13 RX ADMIN — Medication SCH MG: at 06:02

## 2020-09-13 RX ADMIN — DEXTROSE MONOHYDRATE, SODIUM CHLORIDE, AND POTASSIUM CHLORIDE PRN MLS/HR: 50; 9; 1.49 INJECTION, SOLUTION INTRAVENOUS at 11:12

## 2020-09-13 RX ADMIN — Medication SCH MG: at 13:00

## 2020-09-13 RX ADMIN — DEXAMETHASONE SODIUM PHOSPHATE PRN MG: 10 INJECTION INTRAMUSCULAR; INTRAVENOUS at 13:04

## 2020-09-13 RX ADMIN — METRONIDAZOLE SCH MLS/HR: 500 INJECTION, SOLUTION INTRAVENOUS at 06:02

## 2020-09-13 RX ADMIN — Medication SCH: at 21:36

## 2020-09-13 RX ADMIN — DEXTROSE MONOHYDRATE, SODIUM CHLORIDE, AND POTASSIUM CHLORIDE PRN MLS/HR: 50; 9; 1.49 INJECTION, SOLUTION INTRAVENOUS at 19:02

## 2020-09-13 RX ADMIN — MORPHINE SULFATE PRN MG: 10 INJECTION INTRAMUSCULAR; INTRAVENOUS; SUBCUTANEOUS at 07:56

## 2020-09-13 RX ADMIN — MORPHINE SULFATE PRN MG: 10 INJECTION INTRAMUSCULAR; INTRAVENOUS; SUBCUTANEOUS at 13:04

## 2020-09-13 RX ADMIN — LINEZOLID SCH MLS/HR: 600 INJECTION, SOLUTION INTRAVENOUS at 11:42

## 2020-09-13 RX ADMIN — Medication SCH MG: at 23:05

## 2020-09-13 RX ADMIN — Medication SCH: at 13:02

## 2020-09-13 RX ADMIN — HEPARIN SODIUM SCH UNIT: 5000 INJECTION, SOLUTION INTRAVENOUS; SUBCUTANEOUS at 06:02

## 2020-09-13 RX ADMIN — LEVOTHYROXINE SODIUM ANHYDROUS SCH MG: 100 INJECTION, POWDER, LYOPHILIZED, FOR SOLUTION INTRAVENOUS at 06:00

## 2020-09-13 RX ADMIN — DEXTROSE MONOHYDRATE, SODIUM CHLORIDE, AND POTASSIUM CHLORIDE PRN MLS/HR: 50; 9; 1.49 INJECTION, SOLUTION INTRAVENOUS at 02:30

## 2020-09-13 RX ADMIN — HEPARIN SODIUM SCH UNIT: 5000 INJECTION, SOLUTION INTRAVENOUS; SUBCUTANEOUS at 13:05

## 2020-09-13 RX ADMIN — CHOLESTYRAMINE SCH GM: 4 POWDER, FOR SUSPENSION ORAL at 17:51

## 2020-09-13 RX ADMIN — PROMETHAZINE HYDROCHLORIDE PRN MG: 25 INJECTION INTRAMUSCULAR; INTRAVENOUS at 23:15

## 2020-09-13 RX ADMIN — LINEZOLID SCH MLS/HR: 600 INJECTION, SOLUTION INTRAVENOUS at 21:35

## 2020-09-13 RX ADMIN — Medication SCH: at 05:48

## 2020-09-13 RX ADMIN — FAMOTIDINE SCH MG: 10 INJECTION INTRAVENOUS at 21:34

## 2020-09-13 RX ADMIN — DEXAMETHASONE SODIUM PHOSPHATE PRN MG: 10 INJECTION INTRAMUSCULAR; INTRAVENOUS at 07:56

## 2020-09-13 RX ADMIN — FAMOTIDINE SCH MG: 10 INJECTION INTRAVENOUS at 11:07

## 2020-09-13 RX ADMIN — CHOLESTYRAMINE SCH GM: 4 POWDER, FOR SUSPENSION ORAL at 08:02

## 2020-09-13 RX ADMIN — CHOLESTYRAMINE SCH GM: 4 POWDER, FOR SUSPENSION ORAL at 11:09

## 2020-09-13 RX ADMIN — MORPHINE SULFATE PRN MG: 10 INJECTION INTRAMUSCULAR; INTRAVENOUS; SUBCUTANEOUS at 19:52

## 2020-09-13 RX ADMIN — Medication SCH MG: at 18:56

## 2020-09-13 RX ADMIN — HEPARIN SODIUM SCH UNIT: 5000 INJECTION, SOLUTION INTRAVENOUS; SUBCUTANEOUS at 21:34

## 2020-09-13 RX ADMIN — MORPHINE SULFATE PRN MG: 10 INJECTION INTRAMUSCULAR; INTRAVENOUS; SUBCUTANEOUS at 00:17

## 2020-09-13 NOTE — PDOC PROGRESS REPORT
Subjective


Progress Note for:: 09/13/20


Subjective:: 





feels better, less  diarrhea


Reason For Visit: 


ACUTE COLITIS/ILEITIS, LEUKOCYTOSIS, HYPOMAGNESEMI








Physical Exam


Vital Signs: 


                                        











Temp Pulse Resp BP Pulse Ox


 


 97.8 F   66   16   124/70   100 


 


 09/13/20 08:00  09/13/20 08:00  09/13/20 08:00  09/13/20 08:00  09/13/20 08:00








                                 Intake & Output











 09/12/20 09/13/20 09/14/20





 06:59 06:59 06:59


 


Intake Total 4675 4200 100


 


Balance 4675 4200 100


 


Weight 71.8 kg 72.2 kg 











General appearance: PRESENT: no acute distress


Head exam: PRESENT: normocephalic


Eye exam: PRESENT: EOMI


Ear exam: PRESENT: normal external ear exam


Mouth exam: PRESENT: moist


Teeth exam: PRESENT: poor dentation


Neck exam: PRESENT: full ROM


Respiratory exam: PRESENT: clear to auscultation susan


Cardiovascular exam: PRESENT: RRR


Breast: PRESENT: Normal


GI/Abdominal exam: PRESENT: soft


Extremities exam: PRESENT: full ROM


Musculoskeletal exam: PRESENT: full ROM


Neurological exam: PRESENT: alert, awake, oriented to person, oriented to place


Psychiatric exam: PRESENT: appropriate affect


Skin exam: PRESENT: dry





Results


Laboratory Results: 


                                        





                                 09/13/20 05:21 





                                 09/13/20 05:21 





                                        











  09/13/20 09/13/20





  05:21 05:21


 


WBC  8.4 


 


RBC  2.78 L 


 


Hgb  8.5 L 


 


Hct  25.5 L 


 


MCV  92 


 


MCH  30.5 


 


MCHC  33.3 


 


RDW  15.7 H 


 


Plt Count  343 


 


Seg Neutrophils %  Not Reportable 


 


Sodium   140.1


 


Potassium   3.5 L


 


Chloride   113 H


 


Carbon Dioxide   19 L


 


Anion Gap   8


 


BUN   5 L


 


Creatinine   0.84


 


Est GFR (African Amer)   > 60


 


Glucose   78


 


Calcium   7.4 L








                                        





09/09/20 21:28   Blood   Blood Culture - Final


                            E.faecium Vre


09/09/20 21:51   Blood   Blood Culture (PCR) - Final


                            Enterococcus Species


09/09/20 21:51   Blood   Blood Culture - Final


                            E.faecium Vre


09/09/20 23:40   Clean Catch Midstream   Urine Culture - Final


                            Klebsiella Oxytoca


                            Proteus Mirabilis





                                        











  09/09/20





  19:30


 


Troponin I  < 0.012











Impressions: 


                                        





Acute Abdomen Series  09/09/20 11:38


IMPRESSION:  1.  No acute cardiopulmonary process.


2.  Absence of the normal haustral folds in the transverse colon.  This pattern 

can be seen in the setting of a chronic inflammatory colitis.


 








Abdomen/Pelvis CT  09/09/20 20:18


IMPRESSION:


 


1.  Marked circumferential wall thickening throughout the colon


suggestive of acute colitis, which may be infectious or


inflammatory in origin.


2.  Gallstones without inflammatory changes.


3.  Right-sided staghorn calculi with chronic severe


hydronephrosis.


 














Assessment & Plan





- Time


Anticipated Discharge Disposition: Home, Self Care


Anticipated Discharge Timeframe: unk





- Plan Summary


Plan Summary: 





pt with c-diff


sxs improved


wbc normalizing


dirrhea improved


wants to try soft food.

## 2020-09-13 NOTE — PDOC PROGRESS REPORT
Subjective


Progress Note for:: 09/13/20


Subjective:: 





 65 year old female who presents the emergency room with a 1 day history of 

abdominal pain.  She admits developing abdominal pain in her left lower abdomen 

on 9/8/2020 which has been a constant colicky aching of moderate to severe 

intensity without radiation associated with constipation and accompanied by 

increased generalized anxiety.  She denies other associated or accompanying 

signs and symptoms.  She denies prior similar episodes.  She admits a history of

colon cancer with a partial colectomy and ileostomy earlier this year, followed 

by a later reversal of her ileostomy in August of this year all of which was 

done here by Dr. Echols.  She has not identified any aggravating or ameliorating

factors for her abdominal pain.  In the emergency room she was found to have a 

white count of 23,300 and CT scan of the abdomen showed diffuse colitis without 

evidence of obstruction.  Additionally she was noted to be hypokalemic and 

hypomagnesemic in the ER.  Her electrolytes are in the process of being repleted

and she will receive her first dose of meropenem in the emergency room prior to 

her admission to the hospitalist service for further evaluation and treatment.








9/13/2020-patient is receiving IV antibiotic therapy for UTI, p.o. vancomycin 

for C. difficile.  WBC count is normalized to 8400.  Afebrile.  No complaints 

from the patient.  Comfortably in the bed communicating well.


Reason For Visit: 


ACUTE COLITIS/ILEITIS, LEUKOCYTOSIS, HYPOMAGNESEMI








Physical Exam


Vital Signs: 


                                        











Temp Pulse Resp BP Pulse Ox


 


 97.9 F   66   16   140/79 H  98 


 


 09/13/20 11:50  09/13/20 11:50  09/13/20 11:50  09/13/20 11:50  09/13/20 11:50








                                 Intake & Output











 09/12/20 09/13/20 09/14/20





 06:59 06:59 06:59


 


Intake Total 4675 4200 1100


 


Balance 4675 4200 1100


 


Weight 71.8 kg 72.2 kg 











General appearance: PRESENT: no acute distress, cooperative


Head exam: PRESENT: atraumatic


Eye exam: PRESENT: PERRLA


Mouth exam: PRESENT: moist, tongue midline


Teeth exam: PRESENT: poor dentation


Neck exam: ABSENT: carotid bruit, JVD, lymphadenopathy, thyromegaly


Respiratory exam: PRESENT: decreased breath sounds


Cardiovascular exam: PRESENT: RRR.  ABSENT: diastolic murmur, rubs, systolic 

murmur


GI/Abdominal exam: PRESENT: normal bowel sounds, soft.  ABSENT: distended, 

guarding, mass, organolmegaly, rebound, tenderness


Rectal exam: PRESENT: deferred


Extremities exam: PRESENT: calf tenderness


Neurological exam: PRESENT: alert, awake, oriented to person, oriented to place,

oriented to time, oriented to situation, CN II-XII grossly intact.  ABSENT: 

motor sensory deficit


Psychiatric exam: PRESENT: appropriate affect, normal mood.  ABSENT: homicidal 

ideation, suicidal ideation





Results


Laboratory Results: 


                                        





                                 09/13/20 05:21 





                                 09/13/20 05:21 





                                        











  09/13/20 09/13/20





  05:21 05:21


 


WBC  8.4 


 


RBC  2.78 L 


 


Hgb  8.5 L 


 


Hct  25.5 L 


 


MCV  92 


 


MCH  30.5 


 


MCHC  33.3 


 


RDW  15.7 H 


 


Plt Count  343 


 


Seg Neutrophils %  Not Reportable 


 


Sodium   140.1


 


Potassium   3.5 L


 


Chloride   113 H


 


Carbon Dioxide   19 L


 


Anion Gap   8


 


BUN   5 L


 


Creatinine   0.84


 


Est GFR (African Amer)   > 60


 


Glucose   78


 


Calcium   7.4 L








                                        





09/09/20 21:28   Blood   Blood Culture - Final


                            E.faecium Vre


09/09/20 21:51   Blood   Blood Culture (PCR) - Final


                            Enterococcus Species


09/09/20 21:51   Blood   Blood Culture - Final


                            E.faecium Vre





                                        











  09/09/20





  19:30


 


Troponin I  < 0.012











Impressions: 


                                        





Acute Abdomen Series  09/09/20 11:38


IMPRESSION:  1.  No acute cardiopulmonary process.


2.  Absence of the normal haustral folds in the transverse colon.  This pattern 

can be seen in the setting of a chronic inflammatory colitis.


 








Abdomen/Pelvis CT  09/09/20 20:18


IMPRESSION:


 


1.  Marked circumferential wall thickening throughout the colon


suggestive of acute colitis, which may be infectious or


inflammatory in origin.


2.  Gallstones without inflammatory changes.


3.  Right-sided staghorn calculi with chronic severe


hydronephrosis.


 














Assessment and Plan





- Diagnosis


(1) Acute colitis


Is this a current diagnosis for this admission?: Yes   


Plan: 


Patient denies any abdominal pains this morning.  Receiving p.o. vancomycin for 

C. difficile.  Plan is to advance the diet.








(2) Clostridioides difficile infection


Is this a current diagnosis for this admission?: Yes   


Plan: 


Continue with oral vancomycin and add Questran for diarrhea





9/13/2020-patient diarrhea is improving.  Receiving oral vancomycin and Questran

for C. difficile diarrhea.  No abdominal pains today.








(3) History of malignant neoplasm of colon


Is this a current diagnosis for this admission?: Yes   





(4) Bacteremia due to vancomycin resistant Enterococcus


Is this a current diagnosis for this admission?: Yes   


Plan: 


Precise source of VRE bacteremia is unclear although likely from the bowels.  

She did have his CAT scan done on presentation with findings suggestive of acute

colitis which may be infectious or inflammatory in origin.  I would suggest the 

need for a colonoscopy once patient is over this acute episode to rule out any 

underlying etiology.  And apparently has a prior history of malignant neoplasm 

of the colon





9/13/2020-patient is presently on IV ceftriaxone, Zyvox for vancomycin resistant

enterococcus.  Afebrile.  WBC count within normal limits.  Plan is to continue 

the present management at this time.








(5) Hypokalemia


Is this a current diagnosis for this admission?: Yes   


Plan: 


Replaced





9/13/2020-serum potassium today is 3.5.  To continue provide potassium 

supplementations at this time.








(6) Hypocalcemia


Is this a current diagnosis for this admission?: Yes   


Plan: 


Adjusted calcium level is about 7.8.  I did replace her calcium today.





9/13/2020-serum calcium is 7.8 with albumin of 2.8.  Corrected serum calcium is 

within normal limits.








- Plan Summary


Summary: 


Patient's TSH noted to be 0.2 with a free T3 of 1.9 T4 level has been obtained 

she is on Synthroid 0.05 mg IV daily at this point.  Patient seems to be hyper 

thyroid may be iatrogenic.  I will decrease her Synthroid dose and will defi

elias recheck to ensure that this is a accurate reading.


Patient also has a complicated urinary tract infection secondary to Klebsiella. 

She has bacteremia secondary to enterococcus.  The gram-positive cocci in chains

is likely enterococcus.  She is on meropenem as well as Flagyl IV and added vanc

omycin p.o. as she is now positive for C. difficile





9/12   She is currently being treated for C. difficile colitis, complicated as 

well as a complicated UTI.  Urine culture is yielding Klebsiella as well as 

Proteus and blood cultures currently rule yielding enterococcus, vancomycin-

resistant.  Patient is currently on meropenem Flagyl and vancomycin orally.  At 

this point I see no indication for the meropenem and as ceftriaxone will be 

adequate for the Klebsiella and Proteus he likely is sufficient to place 

ceftriaxone which is a narrow spectrum.  As for the metronidazole I will leave 

that for now but that probably can be discontinued also at some point.  For the 

VRE bacteremia antibiotic





- Time


Anticipated Discharge Disposition: Home, Self Care


Anticipated Discharge Timeframe: within 72 hours

## 2020-09-14 RX ADMIN — Medication SCH MG: at 23:05

## 2020-09-14 RX ADMIN — CEFTRIAXONE SCH MLS/HR: 1 INJECTION, SOLUTION INTRAVENOUS at 10:34

## 2020-09-14 RX ADMIN — Medication SCH MG: at 05:22

## 2020-09-14 RX ADMIN — HEPARIN SODIUM SCH UNIT: 5000 INJECTION, SOLUTION INTRAVENOUS; SUBCUTANEOUS at 14:46

## 2020-09-14 RX ADMIN — LINEZOLID SCH MLS/HR: 600 INJECTION, SOLUTION INTRAVENOUS at 11:31

## 2020-09-14 RX ADMIN — OXYBUTYNIN CHLORIDE SCH MG: 5 TABLET ORAL at 21:10

## 2020-09-14 RX ADMIN — MORPHINE SULFATE PRN MG: 10 INJECTION INTRAMUSCULAR; INTRAVENOUS; SUBCUTANEOUS at 23:31

## 2020-09-14 RX ADMIN — SERTRALINE HYDROCHLORIDE SCH MG: 50 TABLET ORAL at 11:42

## 2020-09-14 RX ADMIN — ATORVASTATIN CALCIUM SCH MG: 10 TABLET, FILM COATED ORAL at 21:10

## 2020-09-14 RX ADMIN — CHOLESTYRAMINE SCH: 4 POWDER, FOR SUSPENSION ORAL at 14:31

## 2020-09-14 RX ADMIN — ASPIRIN SCH MG: 81 TABLET, COATED ORAL at 14:44

## 2020-09-14 RX ADMIN — DEXTROSE MONOHYDRATE, SODIUM CHLORIDE, AND POTASSIUM CHLORIDE PRN MLS/HR: 50; 9; 1.49 INJECTION, SOLUTION INTRAVENOUS at 08:01

## 2020-09-14 RX ADMIN — DEXTROSE MONOHYDRATE, SODIUM CHLORIDE, AND POTASSIUM CHLORIDE PRN MLS/HR: 50; 9; 1.49 INJECTION, SOLUTION INTRAVENOUS at 16:04

## 2020-09-14 RX ADMIN — Medication SCH: at 05:13

## 2020-09-14 RX ADMIN — HEPARIN SODIUM SCH UNIT: 5000 INJECTION, SOLUTION INTRAVENOUS; SUBCUTANEOUS at 21:12

## 2020-09-14 RX ADMIN — ALPRAZOLAM PRN MG: 0.5 TABLET ORAL at 11:35

## 2020-09-14 RX ADMIN — Medication SCH MG: at 17:40

## 2020-09-14 RX ADMIN — ATENOLOL SCH MG: 50 TABLET ORAL at 14:44

## 2020-09-14 RX ADMIN — LINEZOLID SCH MLS/HR: 600 INJECTION, SOLUTION INTRAVENOUS at 21:11

## 2020-09-14 RX ADMIN — MORPHINE SULFATE PRN MG: 10 INJECTION INTRAMUSCULAR; INTRAVENOUS; SUBCUTANEOUS at 21:26

## 2020-09-14 RX ADMIN — FAMOTIDINE SCH MG: 20 TABLET, FILM COATED ORAL at 21:10

## 2020-09-14 RX ADMIN — FAMOTIDINE SCH MG: 10 INJECTION INTRAVENOUS at 10:34

## 2020-09-14 RX ADMIN — Medication SCH: at 21:01

## 2020-09-14 RX ADMIN — CHOLESTYRAMINE SCH: 4 POWDER, FOR SUSPENSION ORAL at 17:23

## 2020-09-14 RX ADMIN — DEXTROSE MONOHYDRATE, SODIUM CHLORIDE, AND POTASSIUM CHLORIDE PRN MLS/HR: 50; 9; 1.49 INJECTION, SOLUTION INTRAVENOUS at 23:05

## 2020-09-14 RX ADMIN — Medication SCH MG: at 14:45

## 2020-09-14 RX ADMIN — BUSPIRONE HYDROCHLORIDE SCH MG: 10 TABLET ORAL at 14:42

## 2020-09-14 RX ADMIN — HEPARIN SODIUM SCH UNIT: 5000 INJECTION, SOLUTION INTRAVENOUS; SUBCUTANEOUS at 05:22

## 2020-09-14 RX ADMIN — Medication SCH: at 17:35

## 2020-09-14 RX ADMIN — Medication SCH: at 21:00

## 2020-09-14 RX ADMIN — DEXTROSE MONOHYDRATE, SODIUM CHLORIDE, AND POTASSIUM CHLORIDE PRN MLS/HR: 50; 9; 1.49 INJECTION, SOLUTION INTRAVENOUS at 02:07

## 2020-09-14 RX ADMIN — Medication SCH: at 14:38

## 2020-09-14 RX ADMIN — LEVOTHYROXINE SODIUM ANHYDROUS SCH MG: 100 INJECTION, POWDER, LYOPHILIZED, FOR SOLUTION INTRAVENOUS at 05:21

## 2020-09-14 RX ADMIN — CHOLESTYRAMINE SCH GM: 4 POWDER, FOR SUSPENSION ORAL at 08:02

## 2020-09-14 RX ADMIN — OXYBUTYNIN CHLORIDE SCH MG: 5 TABLET ORAL at 14:45

## 2020-09-14 NOTE — PDOC PROGRESS REPORT
Subjective


Progress Note for:: 09/14/20


Subjective:: 





 65 year old female who presents the emergency room with a 1 day history of 

abdominal pain.  She admits developing abdominal pain in her left lower abdomen 

on 9/8/2020 which has been a constant colicky aching of moderate to severe 

intensity without radiation associated with constipation and accompanied by 

increased generalized anxiety.  She denies other associated or accompanying 

signs and symptoms.  She denies prior similar episodes.  She admits a history of

colon cancer with a partial colectomy and ileostomy earlier this year, followed 

by a later reversal of her ileostomy in August of this year all of which was 

done here by Dr. Echols.  She has not identified any aggravating or ameliorating

factors for her abdominal pain.  In the emergency room she was found to have a 

white count of 23,300 and CT scan of the abdomen showed diffuse colitis without 

evidence of obstruction.  Additionally she was noted to be hypokalemic and 

hypomagnesemic in the ER.  Her electrolytes are in the process of being repleted

and she will receive her first dose of meropenem in the emergency room prior to 

her admission to the hospitalist service for further evaluation and treatment.








9/13/2020-patient is receiving IV antibiotic therapy for UTI, p.o. vancomycin 

for C. difficile.  WBC count is normalized to 8400.  Afebrile.  No complaints 

from the patient.  Comfortably in the bed communicating well.





9/14/2020-no acute events in the last 24 hours.  Afebrile.  Complaining of still

having the loose stools.  Requesting to be back on Zoloft and Xanax is.  

Receiving p.o. vancomycin for C. difficile diarrhea and also has a VRE in the 

blood. presently on Zyvox, IV Rocephin.


Reason For Visit: 


ACUTE COLITIS/ILEITIS, LEUKOCYTOSIS, HYPOMAGNESEMI








Physical Exam


Vital Signs: 


                                        











Temp Pulse Resp BP Pulse Ox


 


 98.0 F   88   19   174/104 H  100 


 


 09/14/20 08:26  09/14/20 08:26  09/14/20 08:26  09/14/20 08:26  09/14/20 08:26








                                 Intake & Output











 09/13/20 09/14/20 09/15/20





 06:59 06:59 06:59


 


Intake Total 4200 4828 985


 


Balance 4200 4828 985


 


Weight 72.2 kg 72.5 kg 











General appearance: PRESENT: no acute distress, cooperative


Head exam: PRESENT: atraumatic


Eye exam: PRESENT: PERRLA


Ear exam: PRESENT: normal external ear exam


Mouth exam: PRESENT: neck supple


Teeth exam: PRESENT: poor dentation


Neck exam: ABSENT: carotid bruit, JVD, lymphadenopathy, thyromegaly


Respiratory exam: PRESENT: decreased breath sounds


Cardiovascular exam: PRESENT: RRR.  ABSENT: diastolic murmur, rubs, systolic 

murmur


GI/Abdominal exam: PRESENT: normal bowel sounds, soft.  ABSENT: distended, 

guarding, mass, organolmegaly, rebound, tenderness


Rectal exam: PRESENT: deferred


Extremities exam: PRESENT: full ROM.  ABSENT: calf tenderness, clubbing, pedal 

edema


Neurological exam: PRESENT: alert, awake, oriented to person, oriented to place,

oriented to time, oriented to situation, CN II-XII grossly intact.  ABSENT: 

motor sensory deficit


Psychiatric exam: PRESENT: appropriate affect, normal mood.  ABSENT: homicidal 

ideation, suicidal ideation





Results


Laboratory Results: 


                                        





                                 09/13/20 05:21 





                                 09/13/20 05:21 





                                        











  09/09/20





  19:30


 


Troponin I  < 0.012











Impressions: 


                                        





Acute Abdomen Series  09/09/20 11:38


IMPRESSION:  1.  No acute cardiopulmonary process.


2.  Absence of the normal haustral folds in the transverse colon.  This pattern 

can be seen in the setting of a chronic inflammatory colitis.


 








Abdomen/Pelvis CT  09/09/20 20:18


IMPRESSION:


 


1.  Marked circumferential wall thickening throughout the colon


suggestive of acute colitis, which may be infectious or


inflammatory in origin.


2.  Gallstones without inflammatory changes.


3.  Right-sided staghorn calculi with chronic severe


hydronephrosis.


 














Assessment and Plan





- Diagnosis


(1) Acute colitis


Is this a current diagnosis for this admission?: Yes   


Plan: 


Patient denies any abdominal pains this morning.  Receiving p.o. vancomycin for 

C. difficile.  Plan is to advance the diet.





9/14/2020-patient is receiving p.o. vancomycin for C. difficile diarrhea.  Still

complaining of loose stools.  On contact isolation.








(2) Clostridioides difficile infection


Is this a current diagnosis for this admission?: Yes   


Plan: 


Continue with oral vancomycin and add Questran for diarrhea





9/13/2020-patient diarrhea is improving.  Receiving oral vancomycin and Questran

for C. difficile diarrhea.  No abdominal pains today.








(3) History of malignant neoplasm of colon


Is this a current diagnosis for this admission?: Yes   





(4) Bacteremia due to vancomycin resistant Enterococcus


Is this a current diagnosis for this admission?: Yes   


Plan: 


Precise source of VRE bacteremia is unclear although likely from the bowels.  

She did have his CAT scan done on presentation with findings suggestive of acute

colitis which may be infectious or inflammatory in origin.  I would suggest the 

need for a colonoscopy once patient is over this acute episode to rule out any 

underlying etiology.  And apparently has a prior history of malignant neoplasm 

of the colon





9/13/2020-patient is presently on IV ceftriaxone, Zyvox for vancomycin resistant

enterococcus.  Afebrile.  WBC count within normal limits.  Plan is to continue 

the present management at this time.





9/14/2020-blood cultures are positive for VRE on IV ceftriaxone, Zyvox.  WBC 

count is within normal blood pressures are stable at this time.  Afebrile.








(5) Hypokalemia


Is this a current diagnosis for this admission?: Yes   


Plan: 


Replaced





9/13/2020-serum potassium today is 3.5.  To continue provide potassium 

supplementations at this time.








(6) Hypocalcemia


Is this a current diagnosis for this admission?: Yes   


Plan: 


Adjusted calcium level is about 7.8.  I did replace her calcium today.





9/13/2020-serum calcium is 7.8 with albumin of 2.8.  Corrected serum calcium is 

within normal limits.








- Plan Summary


Summary: 


Patient's TSH noted to be 0.2 with a free T3 of 1.9 T4 level has been obtained 

she is on Synthroid 0.05 mg IV daily at this point.  Patient seems to be hyper 

thyroid may be iatrogenic.  I will decrease her Synthroid dose and will 

definitely recheck to ensure that this is a accurate reading.


Patient also has a complicated urinary tract infection secondary to Klebsiella. 

She has bacteremia secondary to enterococcus.  The gram-positive cocci in chains

is likely enterococcus.  She is on meropenem as well as Flagyl IV and added 

vancomycin p.o. as she is now positive for C. difficile





9/12   She is currently being treated for C. difficile colitis, complicated as 

well as a complicated UTI.  Urine culture is yielding Klebsiella as well as 

Proteus and blood cultures currently rule yielding enterococcus, vancomycin-

resistant.  Patient is currently on meropenem Flagyl and vancomycin orally.  At 

this point I see no indication for the meropenem and as ceftriaxone will be 

adequate for the Klebsiella and Proteus he likely is sufficient to place 

ceftriaxone which is a narrow spectrum.  As for the metronidazole I will leave 

that for now but that probably can be discontinued also at some point.  For the 

VRE bacteremia antibiotic





- Time


Anticipated Discharge Disposition: Home, Self Care


Anticipated Discharge Timeframe: within 48 hours

## 2020-09-14 NOTE — PDOC PROGRESS REPORT
Subjective


Progress Note for:: 09/14/20


Subjective:: 





65-year-old female admitted with colitis, found to have C. difficile and 

bacteremia.  She reports feeling better today.  She is hungry, and would like to

have a regular diet.  She continues to have multiple bowel movements, however 

they are more formed.  Her abdominal pain is improving.  She denies orthostasis,

dizziness, fevers, chills, palpitations, headache, chest pain, shortness of 

breath.


Reason For Visit: 


ACUTE COLITIS/ILEITIS, LEUKOCYTOSIS, HYPOMAGNESEMI








Physical Exam


Vital Signs: 


                                        











Temp Pulse Resp BP Pulse Ox


 


 97.1 F   91   18   145/92 H  99 


 


 09/14/20 12:01  09/14/20 12:01  09/14/20 12:01  09/14/20 12:01  09/14/20 12:01








                                 Intake & Output











 09/13/20 09/14/20 09/15/20





 06:59 06:59 06:59


 


Intake Total 4200 4828 2412


 


Balance 4200 4828 2412


 


Weight 72.2 kg 72.5 kg 72.5 kg











Exam: 





General appearance: PRESENT: no acute distress, cooperative, disheveled


Head exam: PRESENT: atraumatic, normocephalic


Eye exam: PRESENT: EOMI, PERRLA.  ABSENT: scleral icterus


Mouth exam: PRESENT: neck supple


Teeth exam: PRESENT: poor dentation


Neck exam: ABSENT: meningismus, tenderness, thyromegaly, tracheal deviation


Respiratory exam: PRESENT: unlabored.  ABSENT: tachypnea, wheezes


Cardiovascular exam: ABSENT: tachycardia


GI/Abdominal exam: PRESENT: soft, tenderness - Mild tenderness in all 4 

quadrants..  ABSENT: distended, rebound, rigid


Rectal exam: PRESENT: deferred


Extremities exam: ABSENT: clubbing


Musculoskeletal exam: ABSENT: deformity


Neurological exam: PRESENT: alert, awake, oriented to person, oriented to place,

oriented to time, oriented to situation, CN II-XII grossly intact


Psychiatric exam: ABSENT: agitated, anxious, depressed


Focused psych exam: ABSENT: delusional


Skin exam: ABSENT: cyanosis, erythema, jaundice





Results


Laboratory Results: 


                                        





                                 09/13/20 05:21 





                                 09/13/20 05:21 





                                        











  09/09/20





  19:30


 


Troponin I  < 0.012











Impressions: 


                                        





Acute Abdomen Series  09/09/20 11:38


IMPRESSION:  1.  No acute cardiopulmonary process.


2.  Absence of the normal haustral folds in the transverse colon.  This pattern 

can be seen in the setting of a chronic inflammatory colitis.


 








Abdomen/Pelvis CT  09/09/20 20:18


IMPRESSION:


 


1.  Marked circumferential wall thickening throughout the colon


suggestive of acute colitis, which may be infectious or


inflammatory in origin.


2.  Gallstones without inflammatory changes.


3.  Right-sided staghorn calculi with chronic severe


hydronephrosis.


 














Assessment & Plan





- Diagnosis


(1) Acute colitis


Is this a current diagnosis for this admission?: Yes   





- Time


Anticipated Discharge Disposition: Home, Self Care


Anticipated Discharge Timeframe: unknown





- Plan Summary


Plan Summary: 





65-year-old female with colitis.  Her C. difficile is positive.  Continue with 

antibiotics.  She reports that her symptoms are improving.  Advance diet.  Out 

of bed, ambulate in hallways.  Aggressive pulmonary toilet.  No surgical 

intervention is planned at this time.  Cont. fiber supplement.  Surgery will 

continue to follow with you.

## 2020-09-15 LAB
ABSOLUTE LYMPHOCYTES# (MANUAL): 0.9 10^3/UL (ref 0.5–4.7)
ABSOLUTE LYMPHOCYTES# (MANUAL): 1.6 10^3/UL (ref 0.5–4.7)
ABSOLUTE MONOCYTES # (MANUAL): 0.4 10^3/UL (ref 0.1–1.4)
ABSOLUTE MONOCYTES # (MANUAL): 0.5 10^3/UL (ref 0.1–1.4)
ADD MANUAL DIFF: YES
ADD MANUAL DIFF: YES
ALBUMIN SERPL-MCNC: 1.9 G/DL (ref 3.5–5)
ALP SERPL-CCNC: 47 U/L (ref 38–126)
ANION GAP SERPL CALC-SCNC: 4 MMOL/L (ref 5–19)
ANISOCYTOSIS BLD QL SMEAR: (no result)
ANISOCYTOSIS BLD QL SMEAR: SLIGHT
AST SERPL-CCNC: 13 U/L (ref 14–36)
BASOPHILS NFR BLD MANUAL: 0 % (ref 0–2)
BASOPHILS NFR BLD MANUAL: 0 % (ref 0–2)
BILIRUB DIRECT SERPL-MCNC: 0 MG/DL (ref 0–0.4)
BILIRUB SERPL-MCNC: < 0.1 MG/DL (ref 0.2–1.3)
BUN SERPL-MCNC: < 2 MG/DL (ref 7–20)
BURR CELLS BLD QL SMEAR: (no result)
CALCIUM: 7 MG/DL (ref 8.4–10.2)
CHLORIDE SERPL-SCNC: 115 MMOL/L (ref 98–107)
CO2 SERPL-SCNC: 20 MMOL/L (ref 22–30)
DACRYOCYTES BLD QL SMEAR: (no result)
EOSINOPHIL NFR BLD MANUAL: 1 % (ref 0–6)
EOSINOPHIL NFR BLD MANUAL: 3 % (ref 0–6)
ERYTHROCYTE [DISTWIDTH] IN BLOOD BY AUTOMATED COUNT: 15.8 % (ref 11.5–14)
ERYTHROCYTE [DISTWIDTH] IN BLOOD BY AUTOMATED COUNT: 16.1 % (ref 11.5–14)
GLUCOSE SERPL-MCNC: 101 MG/DL (ref 75–110)
HCT VFR BLD CALC: 23.5 % (ref 36–47)
HCT VFR BLD CALC: 28.8 % (ref 36–47)
HGB BLD-MCNC: 7.6 G/DL (ref 12–15.5)
HGB BLD-MCNC: 9.4 G/DL (ref 12–15.5)
MCH RBC QN AUTO: 30.1 PG (ref 27–33.4)
MCH RBC QN AUTO: 30.3 PG (ref 27–33.4)
MCHC RBC AUTO-ENTMCNC: 32.4 G/DL (ref 32–36)
MCHC RBC AUTO-ENTMCNC: 32.5 G/DL (ref 32–36)
MCV RBC AUTO: 93 FL (ref 80–97)
MCV RBC AUTO: 93 FL (ref 80–97)
MONOCYTES % (MANUAL): 6 % (ref 3–13)
MONOCYTES % (MANUAL): 7 % (ref 3–13)
NEUTS BAND NFR BLD MANUAL: 2 % (ref 3–5)
OVALOCYTES BLD QL SMEAR: (no result)
PLATELET # BLD: 350 10^3/UL (ref 150–450)
PLATELET # BLD: 422 10^3/UL (ref 150–450)
PLATELET CLUMP BLD QL SMEAR: PRESENT
PLATELET COMMENT: ADEQUATE
PLATELET COMMENT: ADEQUATE
PLATELET LARGE: PRESENT
POIKILOCYTOSIS BLD QL SMEAR: (no result)
POTASSIUM SERPL-SCNC: 3.9 MMOL/L (ref 3.6–5)
PROT SERPL-MCNC: 3.9 G/DL (ref 6.3–8.2)
RBC # BLD AUTO: 2.52 10^6/UL (ref 3.72–5.28)
RBC # BLD AUTO: 3.11 10^6/UL (ref 3.72–5.28)
SEGMENTED NEUTROPHILS % (MAN): 69 % (ref 42–78)
SEGMENTED NEUTROPHILS % (MAN): 76 % (ref 42–78)
TOTAL CELLS COUNTED BLD: 100
TOTAL CELLS COUNTED BLD: 100
VARIANT LYMPHS NFR BLD MANUAL: 15 % (ref 13–45)
VARIANT LYMPHS NFR BLD MANUAL: 17 % (ref 13–45)
WBC # BLD AUTO: 6.1 10^3/UL (ref 4–10.5)
WBC # BLD AUTO: 7.8 10^3/UL (ref 4–10.5)
WBC TOXIC VACUOLES BLD QL SMEAR: PRESENT

## 2020-09-15 RX ADMIN — OXYBUTYNIN CHLORIDE SCH MG: 5 TABLET ORAL at 22:51

## 2020-09-15 RX ADMIN — BUSPIRONE HYDROCHLORIDE SCH MG: 10 TABLET ORAL at 09:32

## 2020-09-15 RX ADMIN — METOPROLOL TARTRATE PRN MG: 5 INJECTION, SOLUTION INTRAVENOUS at 22:50

## 2020-09-15 RX ADMIN — ATENOLOL SCH: 50 TABLET ORAL at 09:39

## 2020-09-15 RX ADMIN — OXYBUTYNIN CHLORIDE SCH MG: 5 TABLET ORAL at 13:46

## 2020-09-15 RX ADMIN — DEXTROSE MONOHYDRATE, SODIUM CHLORIDE, AND POTASSIUM CHLORIDE PRN MLS/HR: 50; 9; 1.49 INJECTION, SOLUTION INTRAVENOUS at 05:35

## 2020-09-15 RX ADMIN — MORPHINE SULFATE PRN MG: 10 INJECTION INTRAMUSCULAR; INTRAVENOUS; SUBCUTANEOUS at 06:04

## 2020-09-15 RX ADMIN — Medication SCH MG: at 13:07

## 2020-09-15 RX ADMIN — Medication SCH MG: at 17:21

## 2020-09-15 RX ADMIN — Medication SCH: at 05:13

## 2020-09-15 RX ADMIN — Medication SCH: at 22:00

## 2020-09-15 RX ADMIN — HEPARIN SODIUM SCH: 5000 INJECTION, SOLUTION INTRAVENOUS; SUBCUTANEOUS at 13:02

## 2020-09-15 RX ADMIN — Medication SCH MG: at 05:32

## 2020-09-15 RX ADMIN — CEFTRIAXONE SCH MLS/HR: 1 INJECTION, SOLUTION INTRAVENOUS at 09:33

## 2020-09-15 RX ADMIN — FAMOTIDINE SCH MG: 20 TABLET, FILM COATED ORAL at 22:51

## 2020-09-15 RX ADMIN — DEXTROSE MONOHYDRATE, SODIUM CHLORIDE, AND POTASSIUM CHLORIDE PRN MLS/HR: 50; 9; 1.49 INJECTION, SOLUTION INTRAVENOUS at 22:49

## 2020-09-15 RX ADMIN — FAMOTIDINE SCH MG: 20 TABLET, FILM COATED ORAL at 09:32

## 2020-09-15 RX ADMIN — CHOLESTYRAMINE SCH: 4 POWDER, FOR SUSPENSION ORAL at 11:01

## 2020-09-15 RX ADMIN — CHOLESTYRAMINE SCH: 4 POWDER, FOR SUSPENSION ORAL at 15:15

## 2020-09-15 RX ADMIN — ALPRAZOLAM PRN MG: 0.5 TABLET ORAL at 17:20

## 2020-09-15 RX ADMIN — CHOLESTYRAMINE SCH GM: 4 POWDER, FOR SUSPENSION ORAL at 09:33

## 2020-09-15 RX ADMIN — ASPIRIN SCH MG: 81 TABLET, COATED ORAL at 09:32

## 2020-09-15 RX ADMIN — HEPARIN SODIUM SCH UNIT: 5000 INJECTION, SOLUTION INTRAVENOUS; SUBCUTANEOUS at 05:35

## 2020-09-15 RX ADMIN — LINEZOLID SCH MG: 600 TABLET, FILM COATED ORAL at 22:51

## 2020-09-15 RX ADMIN — CHOLESTYRAMINE SCH: 4 POWDER, FOR SUSPENSION ORAL at 09:39

## 2020-09-15 RX ADMIN — LINEZOLID SCH MLS/HR: 600 INJECTION, SOLUTION INTRAVENOUS at 13:06

## 2020-09-15 RX ADMIN — LEVOTHYROXINE SODIUM ANHYDROUS SCH MG: 100 INJECTION, POWDER, LYOPHILIZED, FOR SOLUTION INTRAVENOUS at 05:34

## 2020-09-15 RX ADMIN — MAGNESIUM SULFATE IN DEXTROSE SCH MLS/HR: 10 INJECTION, SOLUTION INTRAVENOUS at 11:55

## 2020-09-15 RX ADMIN — ATORVASTATIN CALCIUM SCH MG: 10 TABLET, FILM COATED ORAL at 22:51

## 2020-09-15 RX ADMIN — MORPHINE SULFATE PRN MG: 10 INJECTION INTRAMUSCULAR; INTRAVENOUS; SUBCUTANEOUS at 22:49

## 2020-09-15 RX ADMIN — HEPARIN SODIUM SCH UNIT: 5000 INJECTION, SOLUTION INTRAVENOUS; SUBCUTANEOUS at 22:50

## 2020-09-15 RX ADMIN — Medication SCH: at 13:02

## 2020-09-15 RX ADMIN — DEXTROSE MONOHYDRATE, SODIUM CHLORIDE, AND POTASSIUM CHLORIDE PRN MLS/HR: 50; 9; 1.49 INJECTION, SOLUTION INTRAVENOUS at 13:07

## 2020-09-15 RX ADMIN — ALPRAZOLAM PRN MG: 0.5 TABLET ORAL at 06:04

## 2020-09-15 RX ADMIN — MAGNESIUM SULFATE IN DEXTROSE SCH MLS/HR: 10 INJECTION, SOLUTION INTRAVENOUS at 10:49

## 2020-09-15 RX ADMIN — SERTRALINE HYDROCHLORIDE SCH MG: 50 TABLET ORAL at 09:32

## 2020-09-15 RX ADMIN — OXYBUTYNIN CHLORIDE SCH MG: 5 TABLET ORAL at 05:34

## 2020-09-15 RX ADMIN — PANTOPRAZOLE SODIUM SCH MG: 20 TABLET, DELAYED RELEASE ORAL at 05:34

## 2020-09-15 NOTE — PDOC PROGRESS REPORT
Subjective


Progress Note for:: 09/15/20


Subjective:: 





Per previous physician:


"65 year old female who presents the emergency room with a 1 day history of 

abdominal pain.  She admits developing abdominal pain in her left lower abdomen 

on 9/8/2020 which has been a constant colicky aching of moderate to severe 

intensity without radiation associated with constipation and accompanied by 

increased generalized anxiety.  She denies other associated or accompanying 

signs and symptoms.  She denies prior similar episodes.  She admits a history of

colon cancer with a partial colectomy and ileostomy earlier this year, followed 

by a later reversal of her ileostomy in August of this year all of which was 

done here by Dr. Echols.  She has not identified any aggravating or ameliorating

factors for her abdominal pain.  In the emergency room she was found to have a 

white count of 23,300 and CT scan of the abdomen showed diffuse colitis without 

evidence of obstruction.  Additionally she was noted to be hypokalemic and 

hypomagnesemic in the ER.  Her electrolytes are in the process of being repleted

and she will receive her first dose of meropenem in the emergency room prior to 

her admission to the hospitalist service for further evaluation and treatment.








9/13/2020-patient is receiving IV antibiotic therapy for UTI, p.o. vancomycin 

for C. difficile.  WBC count is normalized to 8400.  Afebrile.  No complaints 

from the patient.  Comfortably in the bed communicating well.





9/14/2020-no acute events in the last 24 hours.  Afebrile.  Complaining of still

having the loose stools.  Requesting to be back on Zoloft and Xanax is.  

Receiving p.o. vancomycin for C. difficile diarrhea and also has a VRE in the 

blood. presently on Zyvox, IV Rocephin."





9/15/2020


Discussed the case with ID after my initial consult order was placed today.  

They recommended putting the patient on linezolid however I noted the patient is

on Zoloft and BuSpar.  These psychiatric medications have been held and will 

continue to be held until she has completed her linezolid course.  She will need

to be on linezolid for a total of 14 days from the date of her last negative 

blood culture.  Magnesium very low and this is been repleted today.  Corrected 

calcium is actually 8.3 which is essentially normal.  Patient has no new 

specific complaints other than wanting to walk around the hospital.


Reason For Visit: 


ACUTE COLITIS/ILEITIS, LEUKOCYTOSIS, HYPOMAGNESEMI








Physical Exam


Vital Signs: 


                                        











Temp Pulse Resp BP Pulse Ox


 


 97.6 F   73   19   122/65   99 


 


 09/15/20 15:34  09/15/20 15:34  09/15/20 15:34  09/15/20 15:34  09/15/20 15:34








                                 Intake & Output











 09/14/20 09/15/20 09/16/20





 06:59 06:59 06:59


 


Intake Total 4828 5917 1935


 


Balance 4828 5917 1935


 


Weight 72.5 kg 71.6 kg 











General appearance: PRESENT: no acute distress, well-developed, well-nourished


Head exam: PRESENT: atraumatic, normocephalic


Eye exam: PRESENT: conjunctiva pink.  ABSENT: scleral icterus


Mouth exam: PRESENT: moist


Respiratory exam: PRESENT: clear to auscultation susan.  ABSENT: rales, rhonchi, 

wheezes


Cardiovascular exam: PRESENT: RRR.  ABSENT: diastolic murmur, rubs, systolic 

murmur


GI/Abdominal exam: PRESENT: normal bowel sounds, soft.  ABSENT: distended, 

guarding, mass, organolmegaly, rebound, tenderness


Neurological exam: PRESENT: alert, awake, oriented to person, oriented to place,

oriented to time, oriented to situation


Psychiatric exam: PRESENT: appropriate affect, normal mood


Skin exam: PRESENT: dry, intact, warm





Results


Laboratory Results: 


                                        





                                 09/15/20 12:00 





                                 09/15/20 07:50 





                                        











  09/15/20 09/15/20 09/15/20





  07:50 07:50 12:00


 


WBC  6.1   7.8


 


RBC  2.52 L   3.11 L


 


Hgb  7.6 L   9.4 L


 


Hct  23.5 L   28.8 L


 


MCV  93   93


 


MCH  30.3   30.1


 


MCHC  32.5   32.4


 


RDW  16.1 H   15.8 H


 


Plt Count  350   422


 


Seg Neutrophils %  Not Reportable   Not Reportable


 


Sodium   138.7 


 


Potassium   3.9 


 


Chloride   115 H 


 


Carbon Dioxide   20 L 


 


Anion Gap   4 L 


 


BUN   < 2 L 


 


Creatinine   0.86 


 


Est GFR ( Amer)   > 60 


 


Glucose   101 


 


Calcium   7.0 L* 


 


Magnesium   1.0 L* 


 


Total Bilirubin   < 0.1 L 


 


AST   13 L 


 


Alkaline Phosphatase   47 


 


Total Protein   3.9 L 


 


Albumin   1.9 L 








                                        











  09/09/20





  19:30


 


Troponin I  < 0.012











Impressions: 


                                        





Acute Abdomen Series  09/09/20 11:38


IMPRESSION:  1.  No acute cardiopulmonary process.


2.  Absence of the normal haustral folds in the transverse colon.  This pattern 

can be seen in the setting of a chronic inflammatory colitis.


 








Abdomen/Pelvis CT  09/09/20 20:18


IMPRESSION:


 


1.  Marked circumferential wall thickening throughout the colon


suggestive of acute colitis, which may be infectious or


inflammatory in origin.


2.  Gallstones without inflammatory changes.


3.  Right-sided staghorn calculi with chronic severe


hydronephrosis.


 














Assessment and Plan





- Diagnosis


(1) Acute colitis


Is this a current diagnosis for this admission?: Yes   


Plan: 


Per previous physician:


"Patient denies any abdominal pains this morning.  Receiving p.o. vancomycin for

C. difficile.  Plan is to advance the diet.





9/14/2020-patient is receiving p.o. vancomycin for C. difficile diarrhea.  Still

complaining of loose stools.  On contact isolation."





9/15/2020


Continue linezolid and oral vancomycin to complete course


ID consulted today, discussed with them in detail


Cultures reviewed











(2) Bacteremia due to vancomycin resistant Enterococcus


Is this a current diagnosis for this admission?: Yes   


Plan: 


Per previous physician:


"Precise source of VRE bacteremia is unclear although likely from the bowels.  

She did have his CAT scan done on presentation with findings suggestive of acute

colitis which may be infectious or inflammatory in origin.  I would suggest the 

need for a colonoscopy once patient is over this acute episode to rule out any 

underlying etiology.  And apparently has a prior history of malignant neoplasm 

of the colon





9/13/2020-patient is presently on IV ceftriaxone, Zyvox for vancomycin resistant

enterococcus.  Afebrile.  WBC count within normal limits.  Plan is to continue 

the present management at this time.





9/14/2020-blood cultures are positive for VRE on IV ceftriaxone, Zyvox.  WBC 

count is within normal blood pressures are stable at this time.  Afebrile."





9/15/2020


Discussed with ID, consulted today


No need for ceftriaxone, DC'd








(3) Clostridioides difficile infection


Is this a current diagnosis for this admission?: Yes   


Plan: 


Per previous physician:


"Continue with oral vancomycin and add Questran for diarrhea





9/13/2020-patient diarrhea is improving.  Receiving oral vancomycin and Questran

for C. difficile diarrhea.  No abdominal pains today."





9/15/2020


Discussed with ID, unclear if patient is colonized with toxin producing C. 

difficile or if she actually has an infection or had an infection given that her

actual antigen is negative though the antigen gene is positive and the C. 

difficile itself was positive.


As long as she is having ongoing diarrhea, ID recommends completing course of 

oral vancomycin








(4) History of malignant neoplasm of colon


Is this a current diagnosis for this admission?: Yes   





(5) Hypokalemia


Is this a current diagnosis for this admission?: Yes   


Plan: 


Repleted








(6) COPD (chronic obstructive pulmonary disease)


Qualifiers: 


   COPD type: unspecified COPD   Qualified Code(s): J44.9 - Chronic obstructive 

pulmonary disease, unspecified   


Is this a current diagnosis for this admission?: Yes   


Plan: 


Stable








(7) Hypomagnesemia


Is this a current diagnosis for this admission?: Yes   


Plan: 





Repleted, recheck








(8) Sepsis


Qualifiers: 


   Sepsis type: sepsis due to unspecified organism   Sepsis acute organ 

dysfunction status: with acute organ dysfunction   Severe sepsis acute organ 

dysfunction type: acute renal failure   Acute renal failure type: unspecified   

Severe sepsis shock status: without septic shock   Qualified Code(s): A41.9 - 

Sepsis, unspecified organism; R65.20 - Severe sepsis without septic shock; N17.9

- Acute kidney failure, unspecified   


Is this a current diagnosis for this admission?: Yes   


Plan: 





Multiple organisms as above


Antibiotics as above








- Plan Summary


Summary: 


Patient's TSH noted to be 0.2 with a free T3 of 1.9 T4 level has been obtained 

she is on Synthroid 0.05 mg IV daily at this point.  Patient seems to be hyper 

thyroid may be iatrogenic.  I will decrease her Synthroid dose and will 

definitely recheck to ensure that this is a accurate reading.


Patient also has a complicated urinary tract infection secondary to Klebsiella. 

She has bacteremia secondary to enterococcus.  The gram-positive cocci in chains

is likely enterococcus.  She is on meropenem as well as Flagyl IV and added 

vancomycin p.o. as she is now positive for C. difficile





9/12   She is currently being treated for C. difficile colitis, complicated as 

well as a complicated UTI.  Urine culture is yielding Klebsiella as well as 

Proteus and blood cultures currently rule yielding enterococcus, vancomycin-

resistant.  Patient is currently on meropenem Flagyl and vancomycin orally.  At 

this point I see no indication for the meropenem and as ceftriaxone will be 

adequate for the Klebsiella and Proteus he likely is sufficient to place ce

ftriaxone which is a narrow spectrum.  As for the metronidazole I will leave 

that for now but that probably can be discontinued also at some point.  For the 

VRE bacteremia antibiotic





- Time


Time Spent with patient: 35 or more minutes


Medications reviewed and adjusted accordingly: Yes


Anticipated Discharge Disposition: Home with Home Health


Anticipated Discharge Timeframe: within 48 hours





- Inpatient Certification


Based on my medical assessment, after consideration of the patient's 

comorbidities, presenting symptoms, or acuity I expect that the services needed 

warrant INPATIENT care.: Yes


I certify that my determination is in accordance with my understanding of 

Medicare's requirements for reasonable and necessary INPATIENT services [42 CFR 

412.3e].: Yes


Medical Necessity: Significant Comorbidiites Make Outpatient Treatment Too 

Risky, Need Close Monitoring Due to Risk of Patient Decompensation, Need for IV 

Antibiotics, Risk of Complication if Not Cared For in Hospital, Risk of Diagnos

is Which Will Require Inpatient Eval/Care/Monitoring

## 2020-09-16 LAB
ANION GAP SERPL CALC-SCNC: 4 MMOL/L (ref 5–19)
BUN SERPL-MCNC: < 2 MG/DL (ref 7–20)
CALCIUM: 7.6 MG/DL (ref 8.4–10.2)
CHLORIDE SERPL-SCNC: 114 MMOL/L (ref 98–107)
CO2 SERPL-SCNC: 21 MMOL/L (ref 22–30)
GLUCOSE SERPL-MCNC: 95 MG/DL (ref 75–110)
POTASSIUM SERPL-SCNC: 4.2 MMOL/L (ref 3.6–5)

## 2020-09-16 RX ADMIN — LINEZOLID SCH MG: 600 TABLET, FILM COATED ORAL at 22:41

## 2020-09-16 RX ADMIN — FAMOTIDINE SCH MG: 20 TABLET, FILM COATED ORAL at 09:14

## 2020-09-16 RX ADMIN — HEPARIN SODIUM SCH UNIT: 5000 INJECTION, SOLUTION INTRAVENOUS; SUBCUTANEOUS at 05:53

## 2020-09-16 RX ADMIN — METOPROLOL TARTRATE PRN MG: 5 INJECTION, SOLUTION INTRAVENOUS at 13:01

## 2020-09-16 RX ADMIN — OXYBUTYNIN CHLORIDE SCH MG: 5 TABLET ORAL at 22:41

## 2020-09-16 RX ADMIN — ASPIRIN SCH MG: 81 TABLET, COATED ORAL at 09:14

## 2020-09-16 RX ADMIN — Medication SCH UNIT: at 22:42

## 2020-09-16 RX ADMIN — Medication SCH MG: at 10:59

## 2020-09-16 RX ADMIN — Medication SCH MG: at 05:53

## 2020-09-16 RX ADMIN — NIFEDIPINE SCH MG: 30 TABLET, FILM COATED, EXTENDED RELEASE ORAL at 17:49

## 2020-09-16 RX ADMIN — PROMETHAZINE HYDROCHLORIDE PRN MG: 25 INJECTION INTRAMUSCULAR; INTRAVENOUS at 22:55

## 2020-09-16 RX ADMIN — OXYBUTYNIN CHLORIDE SCH MG: 5 TABLET ORAL at 05:54

## 2020-09-16 RX ADMIN — MORPHINE SULFATE PRN MG: 10 INJECTION INTRAMUSCULAR; INTRAVENOUS; SUBCUTANEOUS at 03:59

## 2020-09-16 RX ADMIN — Medication SCH: at 07:03

## 2020-09-16 RX ADMIN — Medication SCH MG: at 01:09

## 2020-09-16 RX ADMIN — LINEZOLID SCH MG: 600 TABLET, FILM COATED ORAL at 09:14

## 2020-09-16 RX ADMIN — Medication SCH MG: at 17:50

## 2020-09-16 RX ADMIN — FAMOTIDINE SCH MG: 20 TABLET, FILM COATED ORAL at 22:41

## 2020-09-16 RX ADMIN — Medication SCH: at 13:00

## 2020-09-16 RX ADMIN — PROMETHAZINE HYDROCHLORIDE PRN MG: 25 INJECTION INTRAMUSCULAR; INTRAVENOUS at 03:59

## 2020-09-16 RX ADMIN — CHOLESTYRAMINE SCH: 4 POWDER, FOR SUSPENSION ORAL at 09:03

## 2020-09-16 RX ADMIN — PANTOPRAZOLE SODIUM SCH MG: 20 TABLET, DELAYED RELEASE ORAL at 05:54

## 2020-09-16 RX ADMIN — HEPARIN SODIUM SCH: 5000 INJECTION, SOLUTION INTRAVENOUS; SUBCUTANEOUS at 13:00

## 2020-09-16 RX ADMIN — HEPARIN SODIUM SCH UNIT: 5000 INJECTION, SOLUTION INTRAVENOUS; SUBCUTANEOUS at 22:41

## 2020-09-16 RX ADMIN — CHOLESTYRAMINE SCH: 4 POWDER, FOR SUSPENSION ORAL at 10:58

## 2020-09-16 RX ADMIN — CHOLESTYRAMINE SCH: 4 POWDER, FOR SUSPENSION ORAL at 15:55

## 2020-09-16 RX ADMIN — ATENOLOL SCH MG: 50 TABLET ORAL at 09:14

## 2020-09-16 RX ADMIN — OXYBUTYNIN CHLORIDE SCH MG: 5 TABLET ORAL at 13:01

## 2020-09-16 RX ADMIN — ALPRAZOLAM PRN MG: 0.5 TABLET ORAL at 13:01

## 2020-09-16 RX ADMIN — Medication SCH: at 22:42

## 2020-09-16 RX ADMIN — MORPHINE SULFATE PRN MG: 10 INJECTION INTRAMUSCULAR; INTRAVENOUS; SUBCUTANEOUS at 22:55

## 2020-09-16 RX ADMIN — ATORVASTATIN CALCIUM SCH MG: 10 TABLET, FILM COATED ORAL at 22:41

## 2020-09-16 NOTE — PDOC PROGRESS REPORT
Subjective


Progress Note for:: 09/16/20


Subjective:: 





65-year-old female admitted with colitis, found to have C. difficile and 

bacteremia.  She reports feeling better today.  She is tolerating a regular 

diet.  She continues to have multiple bowel movements, however they are more 

formed.  Her abdominal pain is essentially resolved.  She denies orthostasis, 

dizziness, fevers, chills, palpitations, headache, chest pain, shortness of 

breath.


Reason For Visit: 


ACUTE COLITIS/ILEITIS, LEUKOCYTOSIS, HYPOMAGNESEMI








Physical Exam


Vital Signs: 


                                        











Temp Pulse Resp BP Pulse Ox


 


 98.1 F   86   18   156/105 H  100 


 


 09/16/20 08:38  09/16/20 08:03  09/16/20 08:03  09/16/20 08:03  09/16/20 08:03








                                 Intake & Output











 09/15/20 09/16/20 09/17/20





 06:59 06:59 06:59


 


Intake Total 5917 3245 250


 


Balance 5917 3245 250


 


Weight 71.6 kg 71.6 kg 











Exam: 





General appearance: PRESENT: no acute distress, cooperative, disheveled


Head exam: PRESENT: atraumatic, normocephalic


Eye exam: PRESENT: EOMI, PERRLA.  ABSENT: scleral icterus


Mouth exam: PRESENT: neck supple


Teeth exam: PRESENT: poor dentation


Neck exam: ABSENT: meningismus, tenderness, thyromegaly, tracheal deviation


Respiratory exam: PRESENT: unlabored.  ABSENT: tachypnea, wheezes


Cardiovascular exam: ABSENT: tachycardia


GI/Abdominal exam: PRESENT: soft.  ABSENT: distended, rebound, rigid, tenderness


Rectal exam: PRESENT: deferred


Extremities exam: ABSENT: clubbing


Musculoskeletal exam: ABSENT: deformity


Neurological exam: PRESENT: alert, awake, oriented to person, oriented to place,

oriented to time, oriented to situation, CN II-XII grossly intact


Psychiatric exam: ABSENT: agitated, anxious, depressed


Focused psych exam: ABSENT: delusional


Skin exam: ABSENT: cyanosis, erythema, jaundice





Results


Laboratory Results: 


                                        





                                 09/15/20 12:00 





                                 09/15/20 07:50 





                                        











  09/15/20





  12:00


 


WBC  7.8


 


RBC  3.11 L


 


Hgb  9.4 L


 


Hct  28.8 L


 


MCV  93


 


MCH  30.1


 


MCHC  32.4


 


RDW  15.8 H


 


Plt Count  422


 


Seg Neutrophils %  Not Reportable








                                        











  09/09/20





  19:30


 


Troponin I  < 0.012











Impressions: 


                                        





Acute Abdomen Series  09/09/20 11:38


IMPRESSION:  1.  No acute cardiopulmonary process.


2.  Absence of the normal haustral folds in the transverse colon.  This pattern 

can be seen in the setting of a chronic inflammatory colitis.


 








Abdomen/Pelvis CT  09/09/20 20:18


IMPRESSION:


 


1.  Marked circumferential wall thickening throughout the colon


suggestive of acute colitis, which may be infectious or


inflammatory in origin.


2.  Gallstones without inflammatory changes.


3.  Right-sided staghorn calculi with chronic severe


hydronephrosis.


 














Assessment & Plan





- Diagnosis


(1) Acute colitis


Is this a current diagnosis for this admission?: Yes   





- Time


Anticipated Discharge Disposition: unknown


Anticipated Discharge Timeframe: unknown





- Plan Summary


Plan Summary: 





65-year-old female with colitis.  Her C. difficile is positive.  Continue with 

antibiotics.  She reports that her symptoms are improving.  Tolerating regular 

diet.  Out of bed, ambulate in hallways.  Aggressive pulmonary toilet.  No 

surgical intervention is planned at this time.  Continue fiber supplement.  

Surgery will sign off. Please renotify with any questions or concerns.

## 2020-09-16 NOTE — PDOC PROGRESS REPORT
Subjective


Subjective:: 





Per previous physician:


"65 year old female who presents the emergency room with a 1 day history of 

abdominal pain.  She admits developing abdominal pain in her left lower abdomen 

on 9/8/2020 which has been a constant colicky aching of moderate to severe 

intensity without radiation associated with constipation and accompanied by 

increased generalized anxiety.  She denies other associated or accompanying 

signs and symptoms.  She denies prior similar episodes.  She admits a history of

colon cancer with a partial colectomy and ileostomy earlier this year, followed 

by a later reversal of her ileostomy in August of this year all of which was 

done here by Dr. Echols.  She has not identified any aggravating or ameliorating

factors for her abdominal pain.  In the emergency room she was found to have a 

white count of 23,300 and CT scan of the abdomen showed diffuse colitis without 

evidence of obstruction.  Additionally she was noted to be hypokalemic and 

hypomagnesemic in the ER.  Her electrolytes are in the process of being repleted

and she will receive her first dose of meropenem in the emergency room prior to 

her admission to the hospitalist service for further evaluation and treatment.








9/13/2020-patient is receiving IV antibiotic therapy for UTI, p.o. vancomycin 

for C. difficile.  WBC count is normalized to 8400.  Afebrile.  No complaints 

from the patient.  Comfortably in the bed communicating well.





9/14/2020-no acute events in the last 24 hours.  Afebrile.  Complaining of still

having the loose stools.  Requesting to be back on Zoloft and Xanax is.  

Receiving p.o. vancomycin for C. difficile diarrhea and also has a VRE in the 

blood. presently on Zyvox, IV Rocephin."





9/15/2020


Discussed the case with ID after my initial consult order was placed today.  

They recommended putting the patient on linezolid however I noted the patient is

on Zoloft and BuSpar.  These psychiatric medications have been held and will 

continue to be held until she has completed her linezolid course.  She will need

to be on linezolid for a total of 14 days from the date of her last negative 

blood culture.  Magnesium very low and this is been repleted today.  Corrected 

calcium is actually 8.3 which is essentially normal.  Patient has no new 

specific complaints other than wanting to walk around the hospital.





9/16/2020


Blood pressure elevated and given that patient is eating and drinking 

appropriately we will stop IV fluids.  Added nifedipine daily.  Calcium and 

magnesium higher than yesterday.  Continue repleting magnesium.  Hemoglobin 

higher.  Patient has no new complaints and states her stools are becoming more 

formed.  Possible can be discharged home tomorrow.  I discussed with the patient

she will need to stop her psychiatric meds while she is on linezolid but she can

restart these as soon as her antibiotic course is finished.


Reason For Visit: 


ACUTE COLITIS/ILEITIS, LEUKOCYTOSIS, HYPOMAGNESEMI








Physical Exam


Vital Signs: 


                                        











Temp Pulse Resp BP Pulse Ox


 


 97.9 F   84   18   170/110 H  100 


 


 09/16/20 11:28  09/16/20 11:28  09/16/20 11:28  09/16/20 12:58  09/16/20 11:28








                                 Intake & Output











 09/15/20 09/16/20 09/17/20





 06:59 06:59 06:59


 


Intake Total 5917 3245 1255


 


Balance 5917 3245 1255


 


Weight 71.6 kg 71.6 kg 











Exam: 





General appearance: PRESENT: no acute distress, well-developed, well-nourished, 

states her stools are becoming more formed


Head exam: PRESENT: atraumatic, normocephalic


Eye exam: PRESENT: conjunctiva pink.  ABSENT: scleral icterus


Mouth exam: PRESENT: moist


Respiratory exam: PRESENT: clear to auscultation susan.  ABSENT: rales, rhonchi, 

wheezes


Cardiovascular exam: PRESENT: RRR.  ABSENT: diastolic murmur, rubs, systolic 

murmur


GI/Abdominal exam: PRESENT: normal bowel sounds, soft.  ABSENT: distended, 

guarding, mass, organolmegaly, rebound, tenderness


Neurological exam: PRESENT: alert, awake, oriented to person, oriented to place,

oriented to time, oriented to situation


Psychiatric exam: PRESENT: appropriate affect, normal mood


Skin exam: PRESENT: dry, intact, warm





Results


Laboratory Results: 


                                        





                                 09/15/20 12:00 





                                 09/16/20 11:06 





                                        











  09/16/20





  11:06


 


Sodium  138.9


 


Potassium  4.2


 


Chloride  114 H


 


Carbon Dioxide  21 L


 


Anion Gap  4 L


 


BUN  < 2 L


 


Creatinine  0.83


 


Est GFR (African Amer)  > 60


 


Glucose  95


 


Calcium  7.6 L


 


Magnesium  1.4 L








                                        











  09/09/20





  19:30


 


Troponin I  < 0.012











Impressions: 


                                        





Acute Abdomen Series  09/09/20 11:38


IMPRESSION:  1.  No acute cardiopulmonary process.


2.  Absence of the normal haustral folds in the transverse colon.  This pattern 

can be seen in the setting of a chronic inflammatory colitis.


 








Abdomen/Pelvis CT  09/09/20 20:18


IMPRESSION:


 


1.  Marked circumferential wall thickening throughout the colon


suggestive of acute colitis, which may be infectious or


inflammatory in origin.


2.  Gallstones without inflammatory changes.


3.  Right-sided staghorn calculi with chronic severe


hydronephrosis.


 














Assessment and Plan





- Diagnosis


(1) Acute colitis


Is this a current diagnosis for this admission?: Yes   


Plan: 


Per previous physician:


"Patient denies any abdominal pains this morning.  Receiving p.o. vancomycin for

C. difficile.  Plan is to advance the diet.





9/14/2020-patient is receiving p.o. vancomycin for C. difficile diarrhea.  Still

complaining of loose stools.  On contact isolation."





9/15/2020


Continue linezolid and oral vancomycin to complete course


ID consulted today, discussed with them in detail


Cultures reviewed





9/16/2020


Oral vancomycin and oral linezolid for a total of 14 days








(2) Bacteremia due to vancomycin resistant Enterococcus


Is this a current diagnosis for this admission?: Yes   


Plan: 


Per previous physician:


"Precise source of VRE bacteremia is unclear although likely from the bowels.  

She did have his CAT scan done on presentation with findings suggestive of acute

colitis which may be infectious or inflammatory in origin.  I would suggest the 

need for a colonoscopy once patient is over this acute episode to rule out any 

underlying etiology.  And apparently has a prior history of malignant neoplasm 

of the colon





9/13/2020-patient is presently on IV ceftriaxone, Zyvox for vancomycin resistant

enterococcus.  Afebrile.  WBC count within normal limits.  Plan is to continue 

the present management at this time.





9/14/2020-blood cultures are positive for VRE on IV ceftriaxone, Zyvox.  WBC 

count is within normal blood pressures are stable at this time.  Afebrile."





9/15/2020


Discussed with ID, consulted today


No need for ceftriaxone, DC'd








(3) Clostridioides difficile infection


Is this a current diagnosis for this admission?: Yes   


Plan: 


Per previous physician:


"Continue with oral vancomycin and add Questran for diarrhea





9/13/2020-patient diarrhea is improving.  Receiving oral vancomycin and Questran

for C. difficile diarrhea.  No abdominal pains today."





9/15/2020


Discussed with ID, unclear if patient is colonized with toxin producing C. 

difficile or if she actually has an infection or had an infection given that her

actual antigen is negative though the antigen gene is positive and the C. 

difficile itself was positive.


As long as she is having ongoing diarrhea, ID recommends completing course of 

oral vancomycin








(4) History of malignant neoplasm of colon


Is this a current diagnosis for this admission?: Yes   





(5) Hypokalemia


Is this a current diagnosis for this admission?: Yes   





(6) COPD (chronic obstructive pulmonary disease)


Qualifiers: 


   COPD type: unspecified COPD   Qualified Code(s): J44.9 - Chronic obstructive 

pulmonary disease, unspecified   


Is this a current diagnosis for this admission?: Yes   





(7) Hypomagnesemia


Is this a current diagnosis for this admission?: Yes   


Plan: 





Down to 1.0, replete and recheck


Still low, continue repleting








(8) Sepsis


Qualifiers: 


   Sepsis type: sepsis due to unspecified organism   Sepsis acute organ 

dysfunction status: with acute organ dysfunction   Severe sepsis acute organ 

dysfunction type: acute renal failure   Acute renal failure type: unspecified   

Severe sepsis shock status: without septic shock   Qualified Code(s): A41.9 - 

Sepsis, unspecified organism; R65.20 - Severe sepsis without septic shock; N17.9

- Acute kidney failure, unspecified   


Is this a current diagnosis for this admission?: Yes   





- Plan Summary


Summary: 


Patient's TSH noted to be 0.2 with a free T3 of 1.9 T4 level has been obtained 

she is on Synthroid 0.05 mg IV daily at this point.  Patient seems to be hyper 

thyroid may be iatrogenic.  I will decrease her Synthroid dose and will 

definitely recheck to ensure that this is a accurate reading.


Patient also has a complicated urinary tract infection secondary to Klebsiella. 

She has bacteremia secondary to enterococcus.  The gram-positive cocci in chains

is likely enterococcus.  She is on meropenem as well as Flagyl IV and added 

vancomycin p.o. as she is now positive for C. difficile





9/12   She is currently being treated for C. difficile colitis, complicated as 

well as a complicated UTI.  Urine culture is yielding Klebsiella as well as 

Proteus and blood cultures currently rule yielding enterococcus, vancomycin-

resistant.  Patient is currently on meropenem Flagyl and vancomycin orally.  At 

this point I see no indication for the meropenem and as ceftriaxone will be 

adequate for the Klebsiella and Proteus he likely is sufficient to place ce

ftriaxone which is a narrow spectrum.  As for the metronidazole I will leave 

that for now but that probably can be discontinued also at some point.  For the 

VRE bacteremia antibiotic





- Time


Time Spent with patient: 15-24 minutes


Medications reviewed and adjusted accordingly: Yes


Anticipated Discharge Disposition: Home with Home Health


Anticipated Discharge Timeframe: within 48 hours





- Inpatient Certification


Based on my medical assessment, after consideration of the patient's 

comorbidities, presenting symptoms, or acuity I expect that the services needed 

warrant INPATIENT care.: Yes


I certify that my determination is in accordance with my understanding of 

Medicare's requirements for reasonable and necessary INPATIENT services [42 CFR 

412.3e].: Yes


Medical Necessity: Significant Comorbidiites Make Outpatient Treatment Too 

Risky, Need Close Monitoring Due to Risk of Patient Decompensation, Need for IV 

Antibiotics, Risk of Complication if Not Cared For in Hospital, Risk of 

Diagnosis Which Will Require Inpatient Eval/Care/Monitoring

## 2020-09-17 LAB
ANION GAP SERPL CALC-SCNC: 5 MMOL/L (ref 5–19)
ANION GAP SERPL CALC-SCNC: 7 MMOL/L (ref 5–19)
BUN SERPL-MCNC: 3 MG/DL (ref 7–20)
BUN SERPL-MCNC: < 2 MG/DL (ref 7–20)
CALCIUM: 8 MG/DL (ref 8.4–10.2)
CALCIUM: 8.2 MG/DL (ref 8.4–10.2)
CHLORIDE SERPL-SCNC: 109 MMOL/L (ref 98–107)
CHLORIDE SERPL-SCNC: 109 MMOL/L (ref 98–107)
CO2 SERPL-SCNC: 24 MMOL/L (ref 22–30)
CO2 SERPL-SCNC: 24 MMOL/L (ref 22–30)
GLUCOSE SERPL-MCNC: 102 MG/DL (ref 75–110)
GLUCOSE SERPL-MCNC: 99 MG/DL (ref 75–110)
POTASSIUM SERPL-SCNC: 3.8 MMOL/L (ref 3.6–5)
POTASSIUM SERPL-SCNC: 3.9 MMOL/L (ref 3.6–5)

## 2020-09-17 RX ADMIN — CHOLESTYRAMINE SCH GM: 4 POWDER, FOR SUSPENSION ORAL at 11:53

## 2020-09-17 RX ADMIN — Medication SCH: at 05:23

## 2020-09-17 RX ADMIN — CHOLESTYRAMINE SCH GM: 4 POWDER, FOR SUSPENSION ORAL at 08:52

## 2020-09-17 RX ADMIN — LINEZOLID SCH MG: 600 TABLET, FILM COATED ORAL at 21:59

## 2020-09-17 RX ADMIN — Medication SCH MG: at 18:41

## 2020-09-17 RX ADMIN — ACETAMINOPHEN PRN MG: 325 TABLET ORAL at 12:18

## 2020-09-17 RX ADMIN — OXYBUTYNIN CHLORIDE SCH MG: 5 TABLET ORAL at 14:32

## 2020-09-17 RX ADMIN — Medication SCH MG: at 11:49

## 2020-09-17 RX ADMIN — HEPARIN SODIUM SCH UNIT: 5000 INJECTION, SOLUTION INTRAVENOUS; SUBCUTANEOUS at 14:00

## 2020-09-17 RX ADMIN — CHOLESTYRAMINE SCH: 4 POWDER, FOR SUSPENSION ORAL at 16:20

## 2020-09-17 RX ADMIN — ASPIRIN SCH MG: 81 TABLET, COATED ORAL at 08:59

## 2020-09-17 RX ADMIN — ALPRAZOLAM PRN MG: 0.5 TABLET ORAL at 14:36

## 2020-09-17 RX ADMIN — MORPHINE SULFATE PRN MG: 10 INJECTION INTRAMUSCULAR; INTRAVENOUS; SUBCUTANEOUS at 05:22

## 2020-09-17 RX ADMIN — PANTOPRAZOLE SODIUM SCH MG: 20 TABLET, DELAYED RELEASE ORAL at 05:22

## 2020-09-17 RX ADMIN — FAMOTIDINE SCH MG: 20 TABLET, FILM COATED ORAL at 08:59

## 2020-09-17 RX ADMIN — HEPARIN SODIUM SCH: 5000 INJECTION, SOLUTION INTRAVENOUS; SUBCUTANEOUS at 21:59

## 2020-09-17 RX ADMIN — Medication SCH: at 22:00

## 2020-09-17 RX ADMIN — DEXAMETHASONE SODIUM PHOSPHATE PRN MG: 10 INJECTION INTRAMUSCULAR; INTRAVENOUS at 12:16

## 2020-09-17 RX ADMIN — Medication SCH MG: at 05:21

## 2020-09-17 RX ADMIN — FAMOTIDINE SCH MG: 20 TABLET, FILM COATED ORAL at 21:59

## 2020-09-17 RX ADMIN — Medication SCH ML: at 14:00

## 2020-09-17 RX ADMIN — Medication SCH: at 14:08

## 2020-09-17 RX ADMIN — ATENOLOL SCH: 50 TABLET ORAL at 08:59

## 2020-09-17 RX ADMIN — Medication SCH UNIT: at 05:22

## 2020-09-17 RX ADMIN — Medication SCH MG: at 00:26

## 2020-09-17 RX ADMIN — OXYBUTYNIN CHLORIDE SCH MG: 5 TABLET ORAL at 21:59

## 2020-09-17 RX ADMIN — Medication SCH UNIT: at 21:59

## 2020-09-17 RX ADMIN — NIFEDIPINE SCH MG: 30 TABLET, FILM COATED, EXTENDED RELEASE ORAL at 08:59

## 2020-09-17 RX ADMIN — ATORVASTATIN CALCIUM SCH MG: 10 TABLET, FILM COATED ORAL at 21:59

## 2020-09-17 RX ADMIN — OXYBUTYNIN CHLORIDE SCH MG: 5 TABLET ORAL at 05:22

## 2020-09-17 RX ADMIN — LINEZOLID SCH MG: 600 TABLET, FILM COATED ORAL at 08:59

## 2020-09-17 RX ADMIN — HEPARIN SODIUM SCH UNIT: 5000 INJECTION, SOLUTION INTRAVENOUS; SUBCUTANEOUS at 05:21

## 2020-09-17 NOTE — PDOC PROGRESS REPORT
Subjective


Subjective:: 





Per previous physician:


"65 year old female who presents the emergency room with a 1 day history of 

abdominal pain.  She admits developing abdominal pain in her left lower abdomen 

on 9/8/2020 which has been a constant colicky aching of moderate to severe 

intensity without radiation associated with constipation and accompanied by 

increased generalized anxiety.  She denies other associated or accompanying 

signs and symptoms.  She denies prior similar episodes.  She admits a history of

colon cancer with a partial colectomy and ileostomy earlier this year, followed 

by a later reversal of her ileostomy in August of this year all of which was 

done here by Dr. Echols.  She has not identified any aggravating or ameliorating

factors for her abdominal pain.  In the emergency room she was found to have a 

white count of 23,300 and CT scan of the abdomen showed diffuse colitis without 

evidence of obstruction.  Additionally she was noted to be hypokalemic and 

hypomagnesemic in the ER.  Her electrolytes are in the process of being repleted

and she will receive her first dose of meropenem in the emergency room prior to 

her admission to the hospitalist service for further evaluation and treatment.








9/13/2020-patient is receiving IV antibiotic therapy for UTI, p.o. vancomycin 

for C. difficile.  WBC count is normalized to 8400.  Afebrile.  No complaints 

from the patient.  Comfortably in the bed communicating well.





9/14/2020-no acute events in the last 24 hours.  Afebrile.  Complaining of still

having the loose stools.  Requesting to be back on Zoloft and Xanax is.  

Receiving p.o. vancomycin for C. difficile diarrhea and also has a VRE in the 

blood. presently on Zyvox, IV Rocephin."





9/15/2020


Discussed the case with ID after my initial consult order was placed today.  

They recommended putting the patient on linezolid however I noted the patient is

on Zoloft and BuSpar.  These psychiatric medications have been held and will 

continue to be held until she has completed her linezolid course.  She will need

to be on linezolid for a total of 14 days from the date of her last negative 

blood culture.  Magnesium very low and this is been repleted today.  Corrected 

calcium is actually 8.3 which is essentially normal.  Patient has no new 

specific complaints other than wanting to walk around the hospital.





9/16/2020


Blood pressure elevated and given that patient is eating and drinking 

appropriately we will stop IV fluids.  Added nifedipine daily.  Calcium and 

magnesium higher than yesterday.  Continue repleting magnesium.  Hemoglobin 

higher.  Patient has no new complaints and states her stools are becoming more 

formed.  Possible can be discharged home tomorrow.  I discussed with the patient

she will need to stop her psychiatric meds while she is on linezolid but she can

restart these as soon as her antibiotic course is finished.





9/17/2020


Blood cultures have not been drawn since they are initially drawn on admission 

and these are necessity to determine if her blood is in fact cleared the infect

ion.  I am concerned that she may have seeded her Mediport and this would need 

to be removed if this is the case.  We will keep her until blood cultures are 

negative for approximately 48 hours and then she will be discharged home at that

point to finish out the rest of her antibiotic course which we will start on 

9/17 for a total of 14 days.  Patient has no new complaints today and states her

diarrhea is starting to become more formed and less frequent.  She also 

specifically tells me that she feels safe at home and that she is not being 

abused or harmed in any way.  Per my discussion with staff, she has made 

statements about complicated and sometimes unsafe sounding home life in the past

on prior admissions but then she quickly changes her story when people suggest 

addressing the issue.  We will need to monitor her closely and it may be 

beneficial for a  to visit her home after discharge.


Reason For Visit: 


ACUTE COLITIS/ILEITIS, LEUKOCYTOSIS, HYPOMAGNESEMI








Physical Exam


Vital Signs: 


                                        











Temp Pulse Resp BP Pulse Ox


 


 98.3 F   76   18   140/88 H  97 


 


 09/17/20 12:00  09/17/20 12:00  09/17/20 12:00  09/17/20 12:00  09/17/20 12:00








                                 Intake & Output











 09/16/20 09/17/20 09/18/20





 06:59 06:59 06:59


 


Intake Total 3245 2060 240


 


Balance 3245 2060 240


 


Weight 71.6 kg 82.1 kg 81.3 kg











Exam: 





General appearance: PRESENT: no acute distress, well-developed, well-nourished, 

states she is having less diarrhea today


Head exam: PRESENT: atraumatic, normocephalic


Eye exam: PRESENT: conjunctiva pink.  ABSENT: scleral icterus


Mouth exam: PRESENT: moist


Respiratory exam: PRESENT: clear to auscultation susan.  ABSENT: rales, rhonchi, 

wheezes


Cardiovascular exam: PRESENT: RRR.  ABSENT: diastolic murmur, rubs, systolic 

murmur


GI/Abdominal exam: PRESENT: normal bowel sounds, soft.  ABSENT: distended, 

guarding, mass, organolmegaly, rebound, tenderness


Neurological exam: PRESENT: alert, awake, oriented to person, oriented to place,

oriented to time, oriented to situation


Psychiatric exam: PRESENT: appropriate affect, normal mood


Skin exam: PRESENT: dry, intact, warm





Results


Laboratory Results: 


                                        





                                 09/15/20 12:00 





                                 09/17/20 09:05 





                                        











  09/17/20 09/17/20





  05:45 09:05


 


Sodium  138.4  140.0


 


Potassium  3.8  3.9


 


Chloride  109 H  109 H


 


Carbon Dioxide  24  24


 


Anion Gap  5  7


 


BUN  3 L  < 2 L


 


Creatinine  0.97  0.94


 


Est GFR ( Amer)  > 60  > 60


 


Glucose  102  99


 


Calcium  8.0 L  8.2 L


 


Magnesium   1.6








                                        











  09/09/20





  19:30


 


Troponin I  < 0.012











Impressions: 


                                        





Acute Abdomen Series  09/09/20 11:38


IMPRESSION:  1.  No acute cardiopulmonary process.


2.  Absence of the normal haustral folds in the transverse colon.  This pattern 

can be seen in the setting of a chronic inflammatory colitis.


 








Abdomen/Pelvis CT  09/09/20 20:18


IMPRESSION:


 


1.  Marked circumferential wall thickening throughout the colon


suggestive of acute colitis, which may be infectious or


inflammatory in origin.


2.  Gallstones without inflammatory changes.


3.  Right-sided staghorn calculi with chronic severe


hydronephrosis.


 














Assessment and Plan





- Diagnosis


(1) Acute colitis


Is this a current diagnosis for this admission?: Yes   


Plan: 


Per previous physician:


"Patient denies any abdominal pains this morning.  Receiving p.o. vancomycin for

C. difficile.  Plan is to advance the diet.





9/14/2020-patient is receiving p.o. vancomycin for C. difficile diarrhea.  Still

complaining of loose stools.  On contact isolation."





9/15/2020


Continue linezolid and oral vancomycin to complete course


ID consulted today, discussed with them in detail


Cultures reviewed





9/16/2020


Oral vancomycin and oral linezolid for a total of 14 days








(2) Bacteremia due to vancomycin resistant Enterococcus


Is this a current diagnosis for this admission?: Yes   


Plan: 


Per previous physician:


"Precise source of VRE bacteremia is unclear although likely from the bowels.  

She did have his CAT scan done on presentation with findings suggestive of acute

colitis which may be infectious or inflammatory in origin.  I would suggest the 

need for a colonoscopy once patient is over this acute episode to rule out any 

underlying etiology.  And apparently has a prior history of malignant neoplasm 

of the colon





9/13/2020-patient is presently on IV ceftriaxone, Zyvox for vancomycin resistant

enterococcus.  Afebrile.  WBC count within normal limits.  Plan is to continue 

the present management at this time.





9/14/2020-blood cultures are positive for VRE on IV ceftriaxone, Zyvox.  WBC 

count is within normal blood pressures are stable at this time.  Afebrile."





9/15/2020


Discussed with ID, consulted today


No need for ceftriaxone, DC'd





9/17/2020


No blood cultures have been drawn since the initial ones done on admission, 

redraw blood cultures today and if these are negative at approximately 48 hours 

patient can be discharged home to complete 14-day antibiotic course, start date 

will be 9/17


Blood cultures become positive we will need to remove her Mediport and 

reconsult ID








(3) Clostridioides difficile infection


Is this a current diagnosis for this admission?: Yes   


Plan: 


Per previous physician:


"Continue with oral vancomycin and add Questran for diarrhea





9/13/2020-patient diarrhea is improving.  Receiving oral vancomycin and Questran

for C. difficile diarrhea.  No abdominal pains today."





9/15/2020


Discussed with ID, unclear if patient is colonized with toxin producing C. 

difficile or if she actually has an infection or had an infection given that her

actual antigen is negative though the antigen gene is positive and the C. 

difficile itself was positive.


As long as she is having ongoing diarrhea, ID recommends completing course of 

oral vancomycin








(4) History of malignant neoplasm of colon


Is this a current diagnosis for this admission?: Yes   


Plan: 





Mediport in place








(5) Hypokalemia


Is this a current diagnosis for this admission?: Yes   





(6) COPD (chronic obstructive pulmonary disease)


Qualifiers: 


   COPD type: unspecified COPD   Qualified Code(s): J44.9 - Chronic obstructive 

pulmonary disease, unspecified   


Is this a current diagnosis for this admission?: Yes   





(7) Hypomagnesemia


Is this a current diagnosis for this admission?: Yes   





(8) Sepsis


Qualifiers: 


   Sepsis type: sepsis due to unspecified organism   Sepsis acute organ 

dysfunction status: with acute organ dysfunction   Severe sepsis acute organ 

dysfunction type: acute renal failure   Acute renal failure type: unspecified   

Severe sepsis shock status: without septic shock   Qualified Code(s): A41.9 - 

Sepsis, unspecified organism; R65.20 - Severe sepsis without septic shock; N17.9

- Acute kidney failure, unspecified   


Is this a current diagnosis for this admission?: Yes   





- Time


Time Spent with patient: 15-24 minutes


Medications reviewed and adjusted accordingly: Yes


Anticipated Discharge Disposition: Home with Home Health


Anticipated Discharge Timeframe: within 48 hours





- Inpatient Certification


Based on my medical assessment, after consideration of the patient's 

comorbidities, presenting symptoms, or acuity I expect that the services needed 

warrant INPATIENT care.: Yes


I certify that my determination is in accordance with my understanding of 

Medicare's requirements for reasonable and necessary INPATIENT services [42 CFR 

412.3e].: Yes


Medical Necessity: Significant Comorbidiites Make Outpatient Treatment Too 

Risky, Need Close Monitoring Due to Risk of Patient Decompensation, Need for IV 

Antibiotics, Risk of Complication if Not Cared For in Hospital, Risk of 

Diagnosis Which Will Require Inpatient Eval/Care/Monitoring

## 2020-09-18 LAB
ANION GAP SERPL CALC-SCNC: 5 MMOL/L (ref 5–19)
BUN SERPL-MCNC: 4 MG/DL (ref 7–20)
CALCIUM: 8.2 MG/DL (ref 8.4–10.2)
CHLORIDE SERPL-SCNC: 108 MMOL/L (ref 98–107)
CO2 SERPL-SCNC: 26 MMOL/L (ref 22–30)
GLUCOSE SERPL-MCNC: 88 MG/DL (ref 75–110)
POTASSIUM SERPL-SCNC: 3.8 MMOL/L (ref 3.6–5)

## 2020-09-18 RX ADMIN — HEPARIN SODIUM SCH: 5000 INJECTION, SOLUTION INTRAVENOUS; SUBCUTANEOUS at 05:02

## 2020-09-18 RX ADMIN — OXYBUTYNIN CHLORIDE SCH MG: 5 TABLET ORAL at 14:36

## 2020-09-18 RX ADMIN — HEPARIN SODIUM SCH: 5000 INJECTION, SOLUTION INTRAVENOUS; SUBCUTANEOUS at 22:00

## 2020-09-18 RX ADMIN — ALPRAZOLAM PRN MG: 0.5 TABLET ORAL at 14:36

## 2020-09-18 RX ADMIN — ALPRAZOLAM PRN MG: 0.5 TABLET ORAL at 03:39

## 2020-09-18 RX ADMIN — ATORVASTATIN CALCIUM SCH MG: 10 TABLET, FILM COATED ORAL at 22:15

## 2020-09-18 RX ADMIN — Medication SCH ML: at 14:37

## 2020-09-18 RX ADMIN — Medication SCH MG: at 05:39

## 2020-09-18 RX ADMIN — FAMOTIDINE SCH MG: 20 TABLET, FILM COATED ORAL at 22:14

## 2020-09-18 RX ADMIN — CHOLESTYRAMINE SCH: 4 POWDER, FOR SUSPENSION ORAL at 11:33

## 2020-09-18 RX ADMIN — LINEZOLID SCH MG: 600 TABLET, FILM COATED ORAL at 22:14

## 2020-09-18 RX ADMIN — MORPHINE SULFATE PRN MG: 10 INJECTION INTRAMUSCULAR; INTRAVENOUS; SUBCUTANEOUS at 15:13

## 2020-09-18 RX ADMIN — CHOLESTYRAMINE SCH GM: 4 POWDER, FOR SUSPENSION ORAL at 15:13

## 2020-09-18 RX ADMIN — ALPRAZOLAM PRN MG: 0.5 TABLET ORAL at 22:14

## 2020-09-18 RX ADMIN — Medication SCH: at 05:40

## 2020-09-18 RX ADMIN — MORPHINE SULFATE PRN MG: 10 INJECTION INTRAMUSCULAR; INTRAVENOUS; SUBCUTANEOUS at 03:35

## 2020-09-18 RX ADMIN — CHOLESTYRAMINE SCH GM: 4 POWDER, FOR SUSPENSION ORAL at 11:33

## 2020-09-18 RX ADMIN — Medication SCH UNIT: at 05:39

## 2020-09-18 RX ADMIN — Medication SCH: at 23:24

## 2020-09-18 RX ADMIN — NIFEDIPINE SCH MG: 30 TABLET, FILM COATED, EXTENDED RELEASE ORAL at 09:43

## 2020-09-18 RX ADMIN — OXYBUTYNIN CHLORIDE SCH MG: 5 TABLET ORAL at 22:14

## 2020-09-18 RX ADMIN — LINEZOLID SCH MG: 600 TABLET, FILM COATED ORAL at 09:43

## 2020-09-18 RX ADMIN — Medication SCH UNIT: at 14:36

## 2020-09-18 RX ADMIN — ATENOLOL SCH MG: 50 TABLET ORAL at 09:43

## 2020-09-18 RX ADMIN — FAMOTIDINE SCH MG: 20 TABLET, FILM COATED ORAL at 09:43

## 2020-09-18 RX ADMIN — Medication SCH MG: at 00:25

## 2020-09-18 RX ADMIN — PROMETHAZINE HYDROCHLORIDE PRN MG: 25 INJECTION INTRAMUSCULAR; INTRAVENOUS at 17:32

## 2020-09-18 RX ADMIN — ACETAMINOPHEN PRN MG: 325 TABLET ORAL at 22:15

## 2020-09-18 RX ADMIN — OXYBUTYNIN CHLORIDE SCH MG: 5 TABLET ORAL at 05:39

## 2020-09-18 RX ADMIN — PANTOPRAZOLE SODIUM SCH MG: 20 TABLET, DELAYED RELEASE ORAL at 05:39

## 2020-09-18 RX ADMIN — Medication SCH UNIT: at 22:00

## 2020-09-18 RX ADMIN — Medication SCH MG: at 11:32

## 2020-09-18 RX ADMIN — CHOLESTYRAMINE SCH: 4 POWDER, FOR SUSPENSION ORAL at 09:42

## 2020-09-18 RX ADMIN — ASPIRIN SCH MG: 81 TABLET, COATED ORAL at 09:43

## 2020-09-18 RX ADMIN — HEPARIN SODIUM SCH UNIT: 5000 INJECTION, SOLUTION INTRAVENOUS; SUBCUTANEOUS at 14:36

## 2020-09-18 NOTE — PDOC PROGRESS REPORT
Subjective


Subjective:: 





Per previous physician:


"65 year old female who presents the emergency room with a 1 day history of 

abdominal pain.  She admits developing abdominal pain in her left lower abdomen 

on 9/8/2020 which has been a constant colicky aching of moderate to severe 

intensity without radiation associated with constipation and accompanied by 

increased generalized anxiety.  She denies other associated or accompanying 

signs and symptoms.  She denies prior similar episodes.  She admits a history of

colon cancer with a partial colectomy and ileostomy earlier this year, followed 

by a later reversal of her ileostomy in August of this year all of which was 

done here by Dr. Echols.  She has not identified any aggravating or ameliorating

factors for her abdominal pain.  In the emergency room she was found to have a 

white count of 23,300 and CT scan of the abdomen showed diffuse colitis without 

evidence of obstruction.  Additionally she was noted to be hypokalemic and 

hypomagnesemic in the ER.  Her electrolytes are in the process of being repleted

and she will receive her first dose of meropenem in the emergency room prior to 

her admission to the hospitalist service for further evaluation and treatment.








9/13/2020-patient is receiving IV antibiotic therapy for UTI, p.o. vancomycin 

for C. difficile.  WBC count is normalized to 8400.  Afebrile.  No complaints 

from the patient.  Comfortably in the bed communicating well.





9/14/2020-no acute events in the last 24 hours.  Afebrile.  Complaining of still

having the loose stools.  Requesting to be back on Zoloft and Xanax is.  

Receiving p.o. vancomycin for C. difficile diarrhea and also has a VRE in the 

blood. presently on Zyvox, IV Rocephin."





9/15/2020


Discussed the case with ID after my initial consult order was placed today.  

They recommended putting the patient on linezolid however I noted the patient is

on Zoloft and BuSpar.  These psychiatric medications have been held and will 

continue to be held until she has completed her linezolid course.  She will need

to be on linezolid for a total of 14 days from the date of her last negative 

blood culture.  Magnesium very low and this is been repleted today.  Corrected 

calcium is actually 8.3 which is essentially normal.  Patient has no new 

specific complaints other than wanting to walk around the hospital.





9/16/2020


Blood pressure elevated and given that patient is eating and drinking 

appropriately we will stop IV fluids.  Added nifedipine daily.  Calcium and 

magnesium higher than yesterday.  Continue repleting magnesium.  Hemoglobin 

higher.  Patient has no new complaints and states her stools are becoming more 

formed.  Possible can be discharged home tomorrow.  I discussed with the patient

she will need to stop her psychiatric meds while she is on linezolid but she can

restart these as soon as her antibiotic course is finished.





9/17/2020


Blood cultures have not been drawn since they are initially drawn on admission 

and these are necessity to determine if her blood is in fact cleared the infect

ion.  I am concerned that she may have seeded her Mediport and this would need 

to be removed if this is the case.  We will keep her until blood cultures are 

negative for approximately 48 hours and then she will be discharged home at that

point to finish out the rest of her antibiotic course which we will start on 

9/17 for a total of 14 days.  Patient has no new complaints today and states her

diarrhea is starting to become more formed and less frequent.  She also 

specifically tells me that she feels safe at home and that she is not being 

abused or harmed in any way.  Per my discussion with staff, she has made 

statements about complicated and sometimes unsafe sounding home life in the past

on prior admissions but then she quickly changes her story when people suggest 

addressing the issue.  We will need to monitor her closely and it may be 

beneficial for a  to visit her home after discharge.





9/18/2020


Upon entering the room today, patient was verbally abusive to me and has 

reportedly been verbally abusive to the nursing staff as well.  I asked her to 

treat all the staff here with the mutual respect that we give her and return.  

She reportedly had a very heated discussion with her roommate today per nursing 

notes in which the roommate was very angry that the patient was coming home 

soon.  I again asked patient if she feels safe at home and if she is being 

abused and she says she does feel safe and she is not being abused and also that

she loves her roommate and she has been with them for over 25 years.  She became

very angry at one point and stated she wanted to go home and that she was a 

prisoner here.  I told her that she can leave AGAINST MEDICAL ADVICE anytime she

chooses but I strongly recommend against doing so.  I explained that we are 

looking for an infection in her blood to make sure her port was not affected by 

her recent bacteremia.  She stated she did not care but she also did not want to

go home AGAINST MEDICAL ADVICE either.  So far her blood cultures are negative 

and she is continued on antibiotics as outlined previously.  Otherwise she has 

no new complaints.


Reason For Visit: 


ACUTE COLITIS/ILEITIS, LEUKOCYTOSIS, HYPOMAGNESEMI








Physical Exam


Vital Signs: 


                                        











Temp Pulse Resp BP Pulse Ox


 


 98.1 F   68   16   122/79   98 


 


 09/18/20 15:01  09/18/20 15:01  09/18/20 15:01  09/18/20 15:01  09/18/20 15:01








                                 Intake & Output











 09/17/20 09/18/20 09/19/20





 06:59 06:59 06:59


 


Intake Total 2060 840 240


 


Balance 2060 840 240


 


Weight 82.1 kg 82.3 kg 











Exam: 





General appearance: PRESENT: no acute distress, well-developed, well-nourished, 

states she is is very angry in general today


Head exam: PRESENT: atraumatic, normocephalic


Eye exam: PRESENT: conjunctiva pink.  ABSENT: scleral icterus


Mouth exam: PRESENT: moist


Respiratory exam: PRESENT: clear to auscultation susan.  ABSENT: rales, rhonchi, 

wheezes


Cardiovascular exam: PRESENT: RRR.  ABSENT: diastolic murmur, rubs, systolic 

murmur


GI/Abdominal exam: PRESENT: normal bowel sounds, soft.  ABSENT: distended, 

guarding, mass, organolmegaly, rebound, tenderness


Neurological exam: PRESENT: alert, awake, oriented to person, oriented to place,

oriented to time, oriented to situation


Psychiatric exam: PRESENT: appropriate affect, normal mood


Skin exam: PRESENT: dry, intact, warm





Results


Laboratory Results: 


                                        





                                 09/15/20 12:00 





                                 09/18/20 05:45 





                                        











  09/18/20





  05:45


 


Sodium  139.1


 


Potassium  3.8


 


Chloride  108 H


 


Carbon Dioxide  26


 


Anion Gap  5


 


BUN  4 L


 


Creatinine  1.06


 


Est GFR (African Amer)  > 60


 


Glucose  88


 


Calcium  8.2 L








                                        











  09/09/20





  19:30


 


Troponin I  < 0.012











Impressions: 


                                        





Acute Abdomen Series  09/09/20 11:38


IMPRESSION:  1.  No acute cardiopulmonary process.


2.  Absence of the normal haustral folds in the transverse colon.  This pattern 

can be seen in the setting of a chronic inflammatory colitis.


 








Abdomen/Pelvis CT  09/09/20 20:18


IMPRESSION:


 


1.  Marked circumferential wall thickening throughout the colon


suggestive of acute colitis, which may be infectious or


inflammatory in origin.


2.  Gallstones without inflammatory changes.


3.  Right-sided staghorn calculi with chronic severe


hydronephrosis.


 














Assessment and Plan





- Diagnosis


(1) Acute colitis


Is this a current diagnosis for this admission?: Yes   


Plan: 


Per previous physician:


"Patient denies any abdominal pains this morning.  Receiving p.o. vancomycin for

C. difficile.  Plan is to advance the diet.





9/14/2020-patient is receiving p.o. vancomycin for C. difficile diarrhea.  Still

complaining of loose stools.  On contact isolation."





9/15/2020


Continue linezolid and oral vancomycin to complete course


ID consulted today, discussed with them in detail


Cultures reviewed





9/16/2020


Oral vancomycin and oral linezolid for a total of 14 days








(2) Bacteremia due to vancomycin resistant Enterococcus


Is this a current diagnosis for this admission?: Yes   


Plan: 


Per previous physician:


"Precise source of VRE bacteremia is unclear although likely from the bowels.  

She did have his CAT scan done on presentation with findings suggestive of acute

colitis which may be infectious or inflammatory in origin.  I would suggest the 

need for a colonoscopy once patient is over this acute episode to rule out any 

underlying etiology.  And apparently has a prior history of malignant neoplasm 

of the colon





9/13/2020-patient is presently on IV ceftriaxone, Zyvox for vancomycin resistant

enterococcus.  Afebrile.  WBC count within normal limits.  Plan is to continue 

the present management at this time.





9/14/2020-blood cultures are positive for VRE on IV ceftriaxone, Zyvox.  WBC 

count is within normal blood pressures are stable at this time.  Afebrile."





9/15/2020


Discussed with ID, consulted today


No need for ceftriaxone, DC'd





9/17/2020


No blood cultures have been drawn since the initial ones done on admission, starr riley blood cultures today and if these are negative at approximately 48 hours 

patient can be discharged home to complete 14-day antibiotic course, start date 

will be 9/17


Blood cultures become positive we will need to remove her Mediport and 

reconsult ID





Blood cultures no growth to date








(3) Clostridioides difficile infection


Is this a current diagnosis for this admission?: Yes   





(4) History of malignant neoplasm of colon


Is this a current diagnosis for this admission?: Yes   





(5) Hypokalemia


Is this a current diagnosis for this admission?: Yes   





(6) COPD (chronic obstructive pulmonary disease)


Qualifiers: 


   COPD type: unspecified COPD   Qualified Code(s): J44.9 - Chronic obstructive 

pulmonary disease, unspecified   


Is this a current diagnosis for this admission?: Yes   





(7) Hypomagnesemia


Is this a current diagnosis for this admission?: Yes   





(8) Sepsis


Qualifiers: 


   Sepsis type: sepsis due to unspecified organism   Sepsis acute organ 

dysfunction status: with acute organ dysfunction   Severe sepsis acute organ 

dysfunction type: acute renal failure   Acute renal failure type: unspecified   

Severe sepsis shock status: without septic shock   Qualified Code(s): A41.9 - 

Sepsis, unspecified organism; R65.20 - Severe sepsis without septic shock; N17.9

- Acute kidney failure, unspecified   


Is this a current diagnosis for this admission?: Yes   


Plan: 





Multiple organisms as above


Antibiotics as above





Resolved








- Time


Time Spent with patient: 15-24 minutes


Medications reviewed and adjusted accordingly: Yes


Anticipated Discharge Disposition: Home with Home Health


Anticipated Discharge Timeframe: within 24 hours





- Inpatient Certification


Based on my medical assessment, after consideration of the patient's 

comorbidities, presenting symptoms, or acuity I expect that the services needed 

warrant INPATIENT care.: Yes


I certify that my determination is in accordance with my understanding of 

Medicare's requirements for reasonable and necessary INPATIENT services [42 CFR 

412.3e].: Yes


Medical Necessity: Significant Comorbidiites Make Outpatient Treatment Too 

Risky, Need Close Monitoring Due to Risk of Patient Decompensation, Need for IV 

Antibiotics, Risk of Complication if Not Cared For in Hospital, Risk of 

Diagnosis Which Will Require Inpatient Eval/Care/Monitoring

## 2020-09-19 VITALS — DIASTOLIC BLOOD PRESSURE: 110 MMHG | SYSTOLIC BLOOD PRESSURE: 170 MMHG

## 2020-09-19 LAB
ANION GAP SERPL CALC-SCNC: 6 MMOL/L (ref 5–19)
BUN SERPL-MCNC: 6 MG/DL (ref 7–20)
CALCIUM: 8.7 MG/DL (ref 8.4–10.2)
CHLORIDE SERPL-SCNC: 106 MMOL/L (ref 98–107)
CO2 SERPL-SCNC: 26 MMOL/L (ref 22–30)
GLUCOSE SERPL-MCNC: 82 MG/DL (ref 75–110)
POTASSIUM SERPL-SCNC: 4 MMOL/L (ref 3.6–5)

## 2020-09-19 RX ADMIN — CHOLESTYRAMINE SCH GM: 4 POWDER, FOR SUSPENSION ORAL at 09:02

## 2020-09-19 RX ADMIN — OXYBUTYNIN CHLORIDE SCH MG: 5 TABLET ORAL at 21:12

## 2020-09-19 RX ADMIN — FAMOTIDINE SCH MG: 20 TABLET, FILM COATED ORAL at 21:12

## 2020-09-19 RX ADMIN — LINEZOLID SCH MG: 600 TABLET, FILM COATED ORAL at 21:12

## 2020-09-19 RX ADMIN — LINEZOLID SCH MG: 600 TABLET, FILM COATED ORAL at 10:09

## 2020-09-19 RX ADMIN — ATENOLOL SCH MG: 50 TABLET ORAL at 10:09

## 2020-09-19 RX ADMIN — CHOLESTYRAMINE SCH: 4 POWDER, FOR SUSPENSION ORAL at 14:52

## 2020-09-19 RX ADMIN — OXYBUTYNIN CHLORIDE SCH MG: 5 TABLET ORAL at 14:48

## 2020-09-19 RX ADMIN — ACETAMINOPHEN PRN MG: 325 TABLET ORAL at 20:50

## 2020-09-19 RX ADMIN — Medication SCH: at 14:52

## 2020-09-19 RX ADMIN — HEPARIN SODIUM SCH: 5000 INJECTION, SOLUTION INTRAVENOUS; SUBCUTANEOUS at 06:17

## 2020-09-19 RX ADMIN — ACETAMINOPHEN PRN MG: 325 TABLET ORAL at 14:43

## 2020-09-19 RX ADMIN — CHOLESTYRAMINE SCH: 4 POWDER, FOR SUSPENSION ORAL at 16:12

## 2020-09-19 RX ADMIN — MORPHINE SULFATE PRN MG: 10 INJECTION INTRAMUSCULAR; INTRAVENOUS; SUBCUTANEOUS at 10:06

## 2020-09-19 RX ADMIN — Medication SCH: at 21:13

## 2020-09-19 RX ADMIN — ATORVASTATIN CALCIUM SCH MG: 10 TABLET, FILM COATED ORAL at 21:12

## 2020-09-19 RX ADMIN — OXYBUTYNIN CHLORIDE SCH MG: 5 TABLET ORAL at 06:17

## 2020-09-19 RX ADMIN — NIFEDIPINE SCH MG: 30 TABLET, FILM COATED, EXTENDED RELEASE ORAL at 10:09

## 2020-09-19 RX ADMIN — PANTOPRAZOLE SODIUM SCH MG: 20 TABLET, DELAYED RELEASE ORAL at 06:17

## 2020-09-19 RX ADMIN — ACETAMINOPHEN PRN MG: 325 TABLET ORAL at 09:02

## 2020-09-19 RX ADMIN — Medication SCH: at 21:14

## 2020-09-19 RX ADMIN — Medication SCH ML: at 06:17

## 2020-09-19 RX ADMIN — Medication SCH UNIT: at 06:18

## 2020-09-19 RX ADMIN — ASPIRIN SCH MG: 81 TABLET, COATED ORAL at 10:08

## 2020-09-19 RX ADMIN — FAMOTIDINE SCH MG: 20 TABLET, FILM COATED ORAL at 10:08

## 2020-09-19 RX ADMIN — ALPRAZOLAM PRN MG: 0.5 TABLET ORAL at 14:48

## 2020-09-19 RX ADMIN — HEPARIN SODIUM SCH: 5000 INJECTION, SOLUTION INTRAVENOUS; SUBCUTANEOUS at 21:14

## 2020-09-19 RX ADMIN — HEPARIN SODIUM SCH: 5000 INJECTION, SOLUTION INTRAVENOUS; SUBCUTANEOUS at 14:52

## 2020-09-19 NOTE — PDOC DISCHARGE SUMMARY
Impression





- Admit/DC Date/PCP


Admission Date/Primary Care Provider: 


  09/09/20 22:21





  DON MORE





Discharge Date: 09/19/20





- Discharge Diagnosis


(1) Acute colitis


Is this a current diagnosis for this admission?: Yes   





(2) Bacteremia due to vancomycin resistant Enterococcus


Is this a current diagnosis for this admission?: Yes   





(3) Clostridioides difficile infection


Is this a current diagnosis for this admission?: Yes   





(4) History of malignant neoplasm of colon


Is this a current diagnosis for this admission?: Yes   





(5) Hypokalemia


Is this a current diagnosis for this admission?: Yes   





(6) COPD (chronic obstructive pulmonary disease)


Is this a current diagnosis for this admission?: Yes   





(7) Hypomagnesemia


Is this a current diagnosis for this admission?: Yes   





(8) Sepsis


Is this a current diagnosis for this admission?: Yes   





- Additional Information


Resuscitation Status: Full Code


Discharge Diet: As Tolerated, Regular


Discharge Activity: Activity As Tolerated, Balance Activity w/Rest


Referrals: 


ROBBI NOBLE FNP-C [Primary Care Provider] - Follow up as needed


Prescriptions: 


Psyllium Seed [Metamucil-Sf Powder 5.85 gm Packet] 1 packet PO BID #60 packet


Nifedipine [Procardia XL 30 mg Tablet] 30 mg PO DAILY #30 tab.er.24


Cholestyramine [Questran 4 gm Packet] 4 gm PO AC #90 packet


Vancomycin HCl [Vancocin HCl] 125 mg PO Q6 #48 capsule


Linezolid [Zyvox] 600 mg PO Q12 12 Days #24 tablet


Home Medications: 








Oxybutynin Chloride [Ditropan 5 mg Tablet] 5 mg PO Q8 #0 03/09/12 


Pravastatin Sodium [Pravachol] 20 mg PO QHS 12/28/16 


Aspirin [Ecotrin 81 mg EC Tablet] 81 mg PO DAILY 06/16/20 


Levothyroxine Sodium [Synthroid 0.1 mg Tablet] 200 mcg PO Q6AM 07/30/20 


Omeprazole Magnesium [Prilosec Otc] 20 mg PO Q6AM 07/30/20 


Hydroxyzine Pamoate [Vistaril 25 mg Capsule] 25 mg PO Q8HP PRN 08/11/20 


Albuterol Sulfate [Proair HFA Inhalation Aerosol 8.5 gm MDI] 2 puff IH Q6HP PRN 

hfa.aer.ad 08/16/20 


Atenolol [Tenormin 50 mg Tablet] 25 mg PO DAILY  tablet 08/16/20 


Alprazolam [Xanax] 1 mg PO Q8HP PRN 09/10/20 


Buspirone HCl [Buspar 10 mg Tablet] 22.5 mg PO DAILY #0 09/17/20 


Cholestyramine [Questran 4 gm Packet] 4 gm PO AC #90 packet 09/17/20 


Nifedipine [Procardia XL 30 mg Tablet] 30 mg PO DAILY #30 tab.er.24 09/17/20 


Psyllium Seed [Metamucil-Sf Powder 5.85 gm Packet] 1 packet PO BID #60 packet 

09/17/20 


Sertraline HCl 150 mg PO DAILY #0 09/17/20 


Linezolid [Zyvox] 600 mg PO Q12 12 Days #24 tablet 09/19/20 


Vancomycin HCl [Vancocin HCl] 125 mg PO Q6 #48 capsule 09/19/20 











History of Present Illiness


History of Present Illness: 


Per Admitting Physician:


"SERGEI BAKER is a 65 year old female who presents the emergency room with 

a 1 day history of abdominal pain.  She admits developing abdominal pain in her 

left lower abdomen on 9/8/2020 which has been a constant colicky aching of 

moderate to severe intensity without radiation associated with constipation and 

accompanied by increased generalized anxiety.  She denies other associated or 

accompanying signs and symptoms.  She denies prior similar episodes.  She admits

a history of colon cancer with a partial colectomy and ileostomy earlier this 

year, followed by a later reversal of her ileostomy in August of this year all 

of which was done here by Dr. Echols.  She has not identified any aggravating or

ameliorating factors for her abdominal pain.  In the emergency room she was 

found to have a white count of 23,300 and CT scan of the abdomen showed diffuse 

colitis without evidence of obstruction.  Additionally she was noted to be 

hypokalemic and hypomagnesemic in the ER.  Her electrolytes are in the process 

of being repleted and she will receive her first dose of meropenem in the 

emergency room prior to her admission to the hospitalist service for further 

evaluation and treatment."








Hospital Course


Hospital Course: 








Per previous physician:


"65 year old female who presents the emergency room with a 1 day history of 

abdominal pain.  She admits developing abdominal pain in her left lower abdomen 

on 9/8/2020 which has been a constant colicky aching of moderate to severe 

intensity without radiation associated with constipation and accompanied by 

increased generalized anxiety.  She denies other associated or accompanying 

signs and symptoms.  She denies prior similar episodes.  She admits a history of

colon cancer with a partial colectomy and ileostomy earlier this year, followed 

by a later reversal of her ileostomy in August of this year all of which was do

ne here by Dr. Echols.  She has not identified any aggravating or ameliorating 

factors for her abdominal pain.  In the emergency room she was found to have a 

white count of 23,300 and CT scan of the abdomen showed diffuse colitis without 

evidence of obstruction.  Additionally she was noted to be hypokalemic and 

hypomagnesemic in the ER.  Her electrolytes are in the process of being repleted

and she will receive her first dose of meropenem in the emergency room prior to 

her admission to the hospitalist service for further evaluation and treatment.








9/13/2020-patient is receiving IV antibiotic therapy for UTI, p.o. vancomycin 

for C. difficile.  WBC count is normalized to 8400.  Afebrile.  No complaints 

from the patient.  Comfortably in the bed communicating well.





9/14/2020-no acute events in the last 24 hours.  Afebrile.  Complaining of still

having the loose stools.  Requesting to be back on Zoloft and Xanax is.  

Receiving p.o. vancomycin for C. difficile diarrhea and also has a VRE in the 

blood. presently on Zyvox, IV Rocephin."





9/15/2020


Discussed the case with ID after my initial consult order was placed today.  

They recommended putting the patient on linezolid however I noted the patient is

on Zoloft and BuSpar.  These psychiatric medications have been held and will 

continue to be held until she has completed her linezolid course.  She will need

to be on linezolid for a total of 14 days from the date of her last negative 

blood culture.  Magnesium very low and this is been repleted today.  Corrected 

calcium is actually 8.3 which is essentially normal.  Patient has no new 

specific complaints other than wanting to walk around the hospital.





9/16/2020


Blood pressure elevated and given that patient is eating and drinking 

appropriately we will stop IV fluids.  Added nifedipine daily.  Calcium and 

magnesium higher than yesterday.  Continue repleting magnesium.  Hemoglobin 

higher.  Patient has no new complaints and states her stools are becoming more 

formed.  Possible can be discharged home tomorrow.  I discussed with the patient

she will need to stop her psychiatric meds while she is on linezolid but she can

restart these as soon as her antibiotic course is finished.





9/17/2020


Blood cultures have not been drawn since they are initially drawn on admission 

and these are necessity to determine if her blood is in fact cleared the 

infection.  I am concerned that she may have seeded her Mediport and this would 

need to be removed if this is the case.  We will keep her until blood cultures 

are negative for approximately 48 hours and then she will be discharged home at 

that point to finish out the rest of her antibiotic course which we will start 

on 9/17 for a total of 14 days.  Patient has no new complaints today and states 

her diarrhea is starting to become more formed and less frequent.  She also 

specifically tells me that she feels safe at home and that she is not being 

abused or harmed in any way.  Per my discussion with staff, she has made 

statements about complicated and sometimes unsafe sounding home life in the past

on prior admissions but then she quickly changes her story when people suggest 

addressing the issue.  We will need to monitor her closely and it may be 

beneficial for a  to visit her home after discharge.





9/18/2020


Upon entering the room today, patient was verbally abusive to me and has 

reportedly been verbally abusive to the nursing staff as well.  I asked her to 

treat all the staff here with the mutual respect that we give her and return.  

She reportedly had a very heated discussion with her roommate today per nursing 

notes in which the roommate was very angry that the patient was coming home 

soon.  I again asked patient if she feels safe at home and if she is being 

abused and she says she does feel safe and she is not being abused and also that

she loves her roommate and she has been with them for over 25 years.  She became

very angry at one point and stated she wanted to go home and that she was a 

prisoner here.  I told her that she can leave AGAINST MEDICAL ADVICE anytime she

chooses but I strongly recommend against doing so.  I explained that we are 

looking for an infection in her blood to make sure her port was not affected by 

her recent bacteremia.  She stated she did not care but she also did not want to

go home AGAINST MEDICAL ADVICE either.  So far her blood cultures are negative 

and she is continued on antibiotics as outlined previously.  Otherwise she has 

no new complaints.





Discharged on oral linezolid and oral vancomycin.  Patient understands she must 

restart her psychiatric medications after she completes linezolid.  These 

medications will be monitored by her PCP and psychiatrist outpatient.











(1) Acute colitis


Is this a current diagnosis for this admission?: Yes   


Plan: 


Per previous physician:


"Patient denies any abdominal pains this morning.  Receiving p.o. vancomycin for

C. difficile.  Plan is to advance the diet.





9/14/2020-patient is receiving p.o. vancomycin for C. difficile diarrhea.  Still

complaining of loose stools.  On contact isolation."





9/15/2020


Continue linezolid and oral vancomycin to complete course


ID consulted today, discussed with them in detail


Cultures reviewed





9/16/2020


Oral vancomycin and oral linezolid for a total of 14 days from date of negative

blood culture, 9/17








(2) Bacteremia due to vancomycin resistant Enterococcus


Is this a current diagnosis for this admission?: Yes   


Plan: 


Per previous physician:


"Precise source of VRE bacteremia is unclear although likely from the bowels.  

She did have his CAT scan done on presentation with findings suggestive of acute

colitis which may be infectious or inflammatory in origin.  I would suggest the 

need for a colonoscopy once patient is over this acute episode to rule out any 

underlying etiology.  And apparently has a prior history of malignant neoplasm 

of the colon





9/13/2020-patient is presently on IV ceftriaxone, Zyvox for vancomycin resistant

enterococcus.  Afebrile.  WBC count within normal limits.  Plan is to continue 

the present management at this time.





9/14/2020-blood cultures are positive for VRE on IV ceftriaxone, Zyvox.  WBC 

count is within normal blood pressures are stable at this time.  Afebrile."





9/15/2020


Discussed with ID, consulted today


No need for ceftriaxone, DC'd





9/17/2020


Blood cultures redrawn, pending at discharge, microbiology lab will notify 

hospitalist service and patient if they become positive, patient clinically 

stable with no signs of active infection


14 days of Zyvox and oral vancomycin will start when repeat blood cultures 

negative, 9/17








(3) Clostridioides difficile infection


Is this a current diagnosis for this admission?: Yes   


Plan: 


Per previous physician:


"Continue with oral vancomycin and add Questran for diarrhea





9/13/2020-patient diarrhea is improving.  Receiving oral vancomycin and Questran

for C. difficile diarrhea.  No abdominal pains today."





9/15/2020


Discussed with ID, unclear if patient is colonized with toxin producing C. 

difficile or if she actually has an infection or had an infection given that her

actual antigen is negative though the antigen gene is positive and the C. 

difficile itself was positive.


As long as she is having ongoing diarrhea, ID recommends completing course of 

oral vancomycin








(4) History of malignant neoplasm of colon


Is this a current diagnosis for this admission?: Yes   





(5) Hypokalemia


Is this a current diagnosis for this admission?: Yes   





(6) COPD (chronic obstructive pulmonary disease)


Qualifiers: 


   COPD type: unspecified COPD   Qualified Code(s): J44.9 - Chronic obstructive 

pulmonary disease, unspecified   


Is this a current diagnosis for this admission?: Yes   





(7) Hypomagnesemia


Is this a current diagnosis for this admission?: Yes   


Plan: 





Down to 1.0, replete and recheck


Still low, continue repleting








(8) Sepsis


Qualifiers: 


   Sepsis type: sepsis due to unspecified organism   Sepsis acute organ 

dysfunction status: with acute organ dysfunction   Severe sepsis acute organ 

dysfunction type: acute renal failure   Acute renal failure type: unspecified   

Severe sepsis shock status: without septic shock   Qualified Code(s): A41.9 - 

Sepsis, unspecified organism; R65.20 - Severe sepsis without septic shock; N17.9

- Acute kidney failure, unspecified   


Is this a current diagnosis for this admission?: Yes   





Physical Exam


Vital Signs: 


                                        











Temp Pulse Resp BP Pulse Ox


 


 97.9 F   73   16   158/95 H  100 


 


 09/19/20 11:17  09/19/20 11:17  09/19/20 11:17  09/19/20 11:17  09/19/20 11:17








                                 Intake & Output











 09/18/20 09/19/20 09/20/20





 06:59 06:59 06:59


 


Intake Total 840 1260 60


 


Balance 840 1260 60


 


Weight 82.3 kg 79.5 kg 











Exam: 





General appearance: PRESENT: no acute distress, well-developed, well-nourished, 

states she is very glad to be discharged home today


Head exam: PRESENT: atraumatic, normocephalic


Eye exam: PRESENT: conjunctiva pink.  ABSENT: scleral icterus


Mouth exam: PRESENT: moist


Respiratory exam: PRESENT: clear to auscultation susan.  ABSENT: rales, rhonchi, 

wheezes


Cardiovascular exam: PRESENT: RRR.  ABSENT: diastolic murmur, rubs, systolic 

murmur


GI/Abdominal exam: PRESENT: normal bowel sounds, soft.  ABSENT: distended, 

guarding, mass, organolmegaly, rebound, tenderness


Neurological exam: PRESENT: alert, awake, oriented to person, oriented to place,

oriented to time, oriented to situation


Psychiatric exam: PRESENT: appropriate affect, normal mood


Skin exam: PRESENT: dry, intact, warm





Results


Laboratory Results: 


                                        











WBC  7.8 10^3/uL (4.0-10.5)   09/15/20  12:00    


 


RBC  3.11 10^6/uL (3.72-5.28)  L  09/15/20  12:00    


 


Hgb  9.4 g/dL (12.0-15.5)  L  09/15/20  12:00    


 


Hct  28.8 % (36.0-47.0)  L  09/15/20  12:00    


 


MCV  93 fl (80-97)   09/15/20  12:00    


 


MCH  30.1 pg (27.0-33.4)   09/15/20  12:00    


 


MCHC  32.4 g/dL (32.0-36.0)   09/15/20  12:00    


 


RDW  15.8 % (11.5-14.0)  H  09/15/20  12:00    


 


Plt Count  422 10^3/uL (150-450)   09/15/20  12:00    


 


Lymph % (Auto)  Not Reportable   09/15/20  12:00    


 


Mono % (Auto)  Not Reportable   09/15/20  12:00    


 


Eos % (Auto)  Not Reportable   09/15/20  12:00    


 


Baso % (Auto)  Not Reportable   09/15/20  12:00    


 


Absolute Neuts (auto)  Not Reportable   09/15/20  12:00    


 


Absolute Lymphs (auto)  Not Reportable   09/15/20  12:00    


 


Absolute Monos (auto)  Not Reportable   09/15/20  12:00    


 


Absolute Eos (auto)  Not Reportable   09/15/20  12:00    


 


Absolute Basos (auto)  Not Reportable   09/15/20  12:00    


 


Total Counted  100   09/15/20  12:00    


 


Seg Neutrophils %  Not Reportable   09/15/20  12:00    


 


Seg Neuts % (Manual)  69 % (42-78)   09/15/20  12:00    


 


Band Neutrophils %  2 % (3-5)  L  09/15/20  12:00    


 


Lymphocytes % (Manual)  17 % (13-45)   09/15/20  12:00    


 


Atypical Lymphs %  4 % (0)   09/15/20  12:00    


 


Monocytes % (Manual)  7 % (3-13)   09/15/20  12:00    


 


Eosinophils % (Manual)  1 % (0-6)   09/15/20  12:00    


 


Basophils % (Manual)  0 % (0-2)   09/15/20  12:00    


 


Abs Neuts (Manual)  5.5 10^3/uL (1.7-8.2)   09/15/20  12:00    


 


Abs Lymphs (Manual)  1.6 10^3/uL (0.5-4.7)   09/15/20  12:00    


 


Abs Monocytes (Manual)  0.5 10^3/uL (0.1-1.4)   09/15/20  12:00    


 


Absolute Eos (Manual)  0.1 10^3/uL (0.0-0.6)   09/15/20  12:00    


 


Abs Basophils (Manual)  0.0 10^3/uL (0.0-0.2)   09/15/20  12:00    


 


Toxic Granulation  1+   09/09/20  19:30    


 


Toxic Vacuolation  PRESENT   09/15/20  07:50    


 


Platelet Estimate  Cancelled   09/12/20  06:30    


 


Clumped Platelets  PRESENT   09/15/20  07:50    


 


Large Platelets  PRESENT   09/15/20  07:50    


 


Platelet Comment  ADEQUATE   09/15/20  12:00    


 


Polychromasia  SLIGHT   09/13/20  05:21    


 


Poikilocytosis  1+   09/15/20  12:00    


 


Anisocytosis  SLIGHT   09/15/20  12:00    


 


Macrocytosis  SLIGHT   09/09/20  19:30    


 


Tear Drop Cells  1+   09/15/20  12:00    


 


Ovalocytes  1+   09/15/20  12:00    


 


Catrina Cells  1+   09/15/20  12:00    


 


Schistocytes  SLIGHT   09/13/20  05:21    


 


RBC Morph Comment  NORMO-CYTIC/CHROMIC   09/11/20  12:38    


 


Sodium  138.2 mmol/L (137-145)   09/19/20  06:25    


 


Potassium  4.0 mmol/L (3.6-5.0)   09/19/20  06:25    


 


Chloride  106 mmol/L ()   09/19/20  06:25    


 


Carbon Dioxide  26 mmol/L (22-30)   09/19/20  06:25    


 


Anion Gap  6  (5-19)   09/19/20  06:25    


 


BUN  6 mg/dL (7-20)  L  09/19/20  06:25    


 


Creatinine  1.06 mg/dL (0.52-1.25)   09/19/20  06:25    


 


Est GFR ( Amer)  > 60  (>60)   09/19/20  06:25    


 


Est GFR (MDRD) Non-Af  52  (>60)  L  09/19/20  06:25    


 


Glucose  82 mg/dL ()   09/19/20  06:25    


 


POC Glucose  80 mg/dL ()   09/09/20  23:47    


 


Lactic Acid  0.7 mmol/L (0.7-2.1)   09/09/20  21:28    


 


Calcium  8.7 mg/dL (8.4-10.2)   09/19/20  06:25    


 


Magnesium  1.6 mg/dL (1.6-2.3)   09/17/20  09:05    


 


Total Bilirubin  < 0.1 mg/dL (0.2-1.3)  L  09/15/20  07:50    


 


Direct Bilirubin  0.0 mg/dL (0.0-0.4)   09/15/20  07:50    


 


Neonat Total Bilirubin  Not Reportable   09/15/20  07:50    


 


Neonat Direct Bilirubin  Not Reportable   09/15/20  07:50    


 


Neonat Indirect Bili  Not Reportable   09/15/20  07:50    


 


AST  13 U/L (14-36)  L  09/15/20  07:50    


 


ALT  6 U/L (<35)   09/15/20  07:50    


 


Alkaline Phosphatase  47 U/L ()   09/15/20  07:50    


 


Troponin I  < 0.012 ng/mL  09/09/20  19:30    


 


Total Protein  3.9 g/dL (6.3-8.2)  L  09/15/20  07:50    


 


Albumin  1.9 g/dL (3.5-5.0)  L  09/15/20  07:50    


 


Triglycerides  147 mg/dL (<150)   09/10/20  05:40    


 


Cholesterol  113.56 mg/dL (0-200)   09/10/20  05:40    


 


LDL Cholesterol Direct  47 mg/dL (<100)   09/10/20  05:40    


 


VLDL Cholesterol  29.0 mg/dL (10-31)   09/10/20  05:40    


 


HDL Cholesterol  31 mg/dL (>40)  L  09/10/20  05:40    


 


Lipase  103.3 U/L ()   09/09/20  19:30    


 


TSH  0.21 uIU/mL (0.47-4.68)  L  09/10/20  05:40    


 


Free T3 pg/mL  1.94 pg/mL (2.77-5.27)  L  09/10/20  05:40    


 


Urine Color  MADHURI   09/09/20  23:40    


 


Urine Appearance  CLOUDY   09/09/20  23:40    


 


Urine pH  6.0  (5.0-9.0)   09/09/20  23:40    


 


Ur Specific Gravity  1.020   09/09/20  23:40    


 


Urine Protein  30 mg/dL (NEGATIVE)  H  09/09/20  23:40    


 


Urine Glucose (UA)  NEGATIVE mg/dL (NEGATIVE)   09/09/20  23:40    


 


Urine Ketones  TRACE mg/dL (NEGATIVE)  H  09/09/20  23:40    


 


Urine Blood  NEGATIVE  (NEGATIVE)   09/09/20  23:40    


 


Urine Nitrite  POSITIVE  (NEGATIVE)  H  09/09/20  23:40    


 


Urine Bilirubin  NEGATIVE  (NEGATIVE)   09/09/20  23:40    


 


Urine Urobilinogen  NEGATIVE mg/dL (<2.0)   09/09/20  23:40    


 


Ur Leukocyte Esterase  SMALL  (NEGATIVE)  H  09/09/20  23:40    


 


Urine WBC (Auto)  69 /HPF  09/09/20  23:40    


 


Urine RBC (Auto)  4 /HPF  09/09/20  23:40    


 


Urine Bacteria (Auto)  3+ /HPF  09/09/20  23:40    


 


Urine WBC Clumps  FEW /HPF  09/09/20  23:40    


 


Squamous Epi Cells Auto  25 /HPF  09/09/20  23:40    


 


Urine Mucus (Auto)  RARE /LPF  09/09/20  23:40    


 


Urine Ascorbic Acid  20  (NEGATIVE)  H  09/09/20  23:40    


 


Stl C. Difficile GDH Ag  POSITIVE  (NEGATIVE)   09/11/20  08:25    


 


Stl C.difficile Tox A&B  NEGATIVE  (NEGATIVE)   09/11/20  08:25    


 


Stl C.difficile Tox PCR  POSITIVE  (NEGATIVE)   09/11/20  08:25    


 


Slides for Path Review  Cancelled   09/12/20  06:30    








                                        











  09/09/20





  19:30


 


Troponin I  < 0.012











Impressions: 


                                        





Acute Abdomen Series  09/09/20 11:38


IMPRESSION:  1.  No acute cardiopulmonary process.


2.  Absence of the normal haustral folds in the transverse colon.  This pattern 

can be seen in the setting of a chronic inflammatory colitis.


 








Abdomen/Pelvis CT  09/09/20 20:18


IMPRESSION:


 


1.  Marked circumferential wall thickening throughout the colon


suggestive of acute colitis, which may be infectious or


inflammatory in origin.


2.  Gallstones without inflammatory changes.


3.  Right-sided staghorn calculi with chronic severe


hydronephrosis.


 














Plan


Plan of Treatment: 


Follow-up with PCP


Follow-up with psychiatry


Complete linezolid and oral vancomycin course then restart psychiatric 

medications


Time Spent: Greater than 30 Minutes





Stroke


Is this a Stroke Patient?: No





Acute Heart Failure


Is this a Heart Failure Patient?: No